# Patient Record
Sex: FEMALE | Race: WHITE | Employment: OTHER | URBAN - METROPOLITAN AREA
[De-identification: names, ages, dates, MRNs, and addresses within clinical notes are randomized per-mention and may not be internally consistent; named-entity substitution may affect disease eponyms.]

---

## 2017-03-24 ENCOUNTER — HOSPITAL ENCOUNTER (OUTPATIENT)
Dept: WOMENS IMAGING | Age: 77
Discharge: HOME | End: 2017-03-24
Payer: MEDICARE

## 2017-03-24 DIAGNOSIS — M81.0 OSTEOPOROSIS, UNSPECIFIED: ICD-10-CM

## 2017-03-24 PROCEDURE — 77080 DXA BONE DENSITY AXIAL: CPT

## 2017-08-08 LAB
ALBUMIN SERPL-MCNC: 3.4 G/DL
ALP BLD-CCNC: 80 U/L
ALT SERPL-CCNC: 9 U/L
ANION GAP SERPL CALCULATED.3IONS-SCNC: 1.6 MMOL/L
AST SERPL-CCNC: 17 U/L
BASOPHILS ABSOLUTE: 0.1 /ΜL
BASOPHILS RELATIVE PERCENT: 0.9 %
BILIRUB SERPL-MCNC: 0.3 MG/DL (ref 0.1–1.4)
BUN BLDV-MCNC: 10 MG/DL
CALCIUM SERPL-MCNC: 8.8 MG/DL
CHLORIDE BLD-SCNC: 106 MMOL/L
CHOLESTEROL, TOTAL: 173 MG/DL
CHOLESTEROL/HDL RATIO: 2.4
CO2: 28 MMOL/L
CREAT SERPL-MCNC: 0.6 MG/DL
EOSINOPHILS ABSOLUTE: 0.1 /ΜL
EOSINOPHILS RELATIVE PERCENT: 1.3 %
GFR CALCULATED: 97
GLUCOSE BLD-MCNC: 75 MG/DL
HCT VFR BLD CALC: 39.5 % (ref 36–46)
HDLC SERPL-MCNC: 47 MG/DL (ref 35–70)
HEMOGLOBIN: 13.7 G/DL (ref 12–16)
LDL CHOLESTEROL CALCULATED: 113 MG/DL (ref 0–160)
LYMPHOCYTES ABSOLUTE: 2.2 /ΜL
LYMPHOCYTES RELATIVE PERCENT: 53.3 %
MCH RBC QN AUTO: 33.6 PG
MCHC RBC AUTO-ENTMCNC: 34.7 G/DL
MCV RBC AUTO: 97 FL
MONOCYTES ABSOLUTE: 0.5 /ΜL
MONOCYTES RELATIVE PERCENT: 10.9 %
NEUTROPHILS ABSOLUTE: 1.4 /ΜL
NEUTROPHILS RELATIVE PERCENT: 33.6 %
PDW BLD-RTO: 12.8 %
PLATELET # BLD: 118 K/ΜL
PMV BLD AUTO: 11.5 FL
POTASSIUM SERPL-SCNC: 4.7 MMOL/L
RBC # BLD: 4.07 10^6/ΜL
SODIUM BLD-SCNC: 142 MMOL/L
TOTAL PROTEIN: 5.5
TRIGL SERPL-MCNC: 65 MG/DL
VLDLC SERPL CALC-MCNC: 13 MG/DL
WBC # BLD: 4.2 10^3/ML

## 2017-10-09 RX ORDER — DIAPER,BRIEF,INFANT-TODD,DISP
EACH MISCELLANEOUS 2 TIMES DAILY
COMMUNITY
End: 2020-03-23 | Stop reason: ALTCHOICE

## 2017-10-09 RX ORDER — LACTULOSE 10 G/15ML
10 SOLUTION ORAL; RECTAL DAILY
Status: ON HOLD | COMMUNITY
End: 2022-10-19 | Stop reason: HOSPADM

## 2017-10-09 RX ORDER — TETRAHYDROZOLINE HCL 0.05 %
1 DROPS OPHTHALMIC (EYE) 3 TIMES DAILY
COMMUNITY
End: 2019-04-08

## 2017-10-09 RX ORDER — DIVALPROEX SODIUM 250 MG/1
500 TABLET, DELAYED RELEASE ORAL 2 TIMES DAILY
COMMUNITY

## 2017-10-09 RX ORDER — BUSPIRONE HYDROCHLORIDE 15 MG/1
30 TABLET ORAL 2 TIMES DAILY
COMMUNITY
End: 2019-04-08

## 2017-10-09 RX ORDER — IBUPROFEN 200 MG
200 TABLET ORAL
Status: ON HOLD | COMMUNITY
End: 2022-10-19 | Stop reason: HOSPADM

## 2017-10-09 RX ORDER — ALBUTEROL SULFATE 0.63 MG/3ML
1 SOLUTION RESPIRATORY (INHALATION) EVERY 6 HOURS PRN
COMMUNITY
End: 2020-05-06

## 2017-10-09 RX ORDER — QUETIAPINE FUMARATE 200 MG/1
100 TABLET, FILM COATED ORAL 2 TIMES DAILY
COMMUNITY
End: 2019-04-08

## 2017-10-10 ENCOUNTER — OFFICE VISIT (OUTPATIENT)
Dept: INTERNAL MEDICINE | Age: 77
End: 2017-10-10

## 2017-10-10 VITALS
SYSTOLIC BLOOD PRESSURE: 110 MMHG | HEIGHT: 60 IN | WEIGHT: 149 LBS | DIASTOLIC BLOOD PRESSURE: 68 MMHG | HEART RATE: 70 BPM | BODY MASS INDEX: 29.25 KG/M2

## 2017-10-10 DIAGNOSIS — F79 MENTAL RETARDATION: ICD-10-CM

## 2017-10-10 DIAGNOSIS — Z23 NEED FOR PNEUMOCOCCAL VACCINATION: ICD-10-CM

## 2017-10-10 DIAGNOSIS — R56.9 SEIZURE (HCC): Primary | ICD-10-CM

## 2017-10-10 DIAGNOSIS — F79 MR (MENTAL RETARDATION): ICD-10-CM

## 2017-10-10 DIAGNOSIS — H54.8 LEGALLY BLIND: ICD-10-CM

## 2017-10-10 DIAGNOSIS — F39 MOOD DISORDER (HCC): ICD-10-CM

## 2017-10-10 PROCEDURE — G8419 CALC BMI OUT NRM PARAM NOF/U: HCPCS | Performed by: FAMILY MEDICINE

## 2017-10-10 PROCEDURE — 4040F PNEUMOC VAC/ADMIN/RCVD: CPT | Performed by: FAMILY MEDICINE

## 2017-10-10 PROCEDURE — G0009 ADMIN PNEUMOCOCCAL VACCINE: HCPCS | Performed by: FAMILY MEDICINE

## 2017-10-10 PROCEDURE — G8427 DOCREV CUR MEDS BY ELIG CLIN: HCPCS | Performed by: FAMILY MEDICINE

## 2017-10-10 PROCEDURE — 1123F ACP DISCUSS/DSCN MKR DOCD: CPT | Performed by: FAMILY MEDICINE

## 2017-10-10 PROCEDURE — 99213 OFFICE O/P EST LOW 20 MIN: CPT | Performed by: FAMILY MEDICINE

## 2017-10-10 PROCEDURE — G8484 FLU IMMUNIZE NO ADMIN: HCPCS | Performed by: FAMILY MEDICINE

## 2017-10-10 PROCEDURE — 90670 PCV13 VACCINE IM: CPT | Performed by: FAMILY MEDICINE

## 2017-10-10 PROCEDURE — G8399 PT W/DXA RESULTS DOCUMENT: HCPCS | Performed by: FAMILY MEDICINE

## 2017-10-10 PROCEDURE — 1036F TOBACCO NON-USER: CPT | Performed by: FAMILY MEDICINE

## 2017-10-10 PROCEDURE — 1090F PRES/ABSN URINE INCON ASSESS: CPT | Performed by: FAMILY MEDICINE

## 2017-10-10 ASSESSMENT — PATIENT HEALTH QUESTIONNAIRE - PHQ9
SUM OF ALL RESPONSES TO PHQ9 QUESTIONS 1 & 2: 0
SUM OF ALL RESPONSES TO PHQ QUESTIONS 1-9: 0
1. LITTLE INTEREST OR PLEASURE IN DOING THINGS: 0
2. FEELING DOWN, DEPRESSED OR HOPELESS: 0

## 2017-10-10 NOTE — PROGRESS NOTES
Patient: Juan Thornton    YOB: 1940    Date: 10/10/17    Patient Active Problem List    Diagnosis Date Noted    Mental retardation 09/03/2013    Dyslipidemia     Periodontal disease     Full code status 07/31/2013     Overview Note:     5/1/05  FULL CODE        Dietary restriction 07/31/2013     Overview Note:     4/21/10  SMALL PORTIONS BLEND. NO RAW FRUIT OR VEGETABLES EXCEPT BANANAS. NO GAS PRODUCING FOODS.  2/22/12  2 SCOOPS OF PROTEIN POWDER PO QD, 1 SCOOP AT BREAKFAST AND 1 SCOOP AT Dozwil.  Vitamin D deficiency     Osteoporosis     MR (mental retardation)     Seizure disorder (HCC)     Legally blind     Chronic rhinitis     Mood disorder ([facility])      Past Medical History:   Diagnosis Date    Ceruminosis     Chronic rhinitis     Dyslipidemia     History of encephalitis     Legally blind     [facility]     MR (mental retardation)     Osteoporosis     Periodontal disease     Seizure disorder ([facility]     Vitamin D deficiency      No past surgical history on file. No family history on file. Social History     Social History    Marital status: Single     Spouse name: N/A    Number of children: N/A    Years of education: N/A     Occupational History    Not on file.      Social History Main Topics    Smoking status: Never Smoker    Smokeless tobacco: Never Used    Alcohol use No    Drug use: No    Sexual activity: Not on file     Other Topics Concern    Not on file     Social History Narrative    ** Merged History Encounter **          Current Outpatient Prescriptions on File Prior to Visit   Medication Sig Dispense Refill    busPIRone (BUSPAR) 15 MG tablet Take 15 mg by mouth 2 times daily      divalproex (DEPAKOTE) 250 MG DR tablet Take 250 mg by mouth 3 times daily      lactulose encephalopathy 10 GM/15ML SOLN solution Take 20 g by mouth 3 times daily      QUEtiapine (SEROQUEL) 200 MG tablet Take 200 mg by mouth 2 times daily      psyllium (REGULOID) 58.6 % powder Take 1 packet by mouth 3 times daily Take by mouth 3 times daily.  albuterol (ACCUNEB) 0.63 MG/3ML nebulizer solution Take 1 ampule by nebulization every 6 hours as needed for Wheezing      sodium chloride (OCEAN, BABY AYR) 0.65 % nasal spray 1 spray by Nasal route as needed for Congestion      carbamide peroxide (DEBROX) 6.5 % otic solution 5 drops 2 times daily      ibuprofen (ADVIL;MOTRIN) 200 MG tablet Take 200 mg by mouth every 6 hours as needed for Pain      hydrocortisone 1 % cream Apply topically 2 times daily Apply topically 2 times daily.  Probiotic Product (PROBIOTIC PO) Take by mouth      Polyvinyl Alcohol-Povidone (REFRESH OP) Apply to eye      pseudoephedrine-codeine-guaifenesin (MYTUSSIN DAC) 30- MG/5ML solution Take 10 mLs by mouth 4 times daily as needed for Cough      tetrahydrozoline 0.05 % ophthalmic solution 1 drop 3 times daily      Sodium Phosphates (FLEET) Place 1 enema rectally daily as needed. Indications: Constipation      mupirocin (BACTROBAN) 2 % ointment Apply INTRANASALLY BID. RUB NARES TOGETHER. Indications: History MRSA.  OXYGEN Inhale 2 L/min into the lungs as needed. ADMINISTER VIA N/C.  TITRATE PRN TO MAINTAIN PULSE OX ABOVE 92%. Indications: Hypoxia      Cetirizine HCl 10 MG CAPS Take 10 mg by mouth daily. 90 capsule 3    alendronate (FOSAMAX) 70 MG tablet Take 70 mg by mouth every 7 days. Indications: Osteoporosis      niacin (NIASPAN) 500 MG CR tablet Take 500 mg by mouth nightly. Indications: Dyslipidemia      simvastatin (ZOCOR) 40 MG tablet Take 40 mg by mouth nightly. Indications: Dyslipidemia      acetaminophen (TYLENOL) 325 MG tablet Take 650 mg by mouth every 4 hours as needed. May give rectal if unable to tolerate PO administration. Indications: Fever, Pain      Magnesium Hydroxide (MILK OF MAGNESIA PO) Take 30 mLs by mouth daily as needed.  Indications: Constipation      hydrocortisone (ANUSOL-HC) 2.5 % rectal cream Place 2.5 g rectally 3 times daily as needed. Indications: Hemorrhoids      risedronate (ACTONEL) 35 MG tablet Take 35 mg by mouth every 7 days.  niacin-lovastatin (ADVICOR) 500-20 MG per tablet Take 1 tablet by mouth nightly. Indications: Dyslipidemia      aspirin 325 MG EC tablet Take 325 mg by mouth daily. Indications: Prevention of CVD      clonazepam (KLONOPIN) 1 MG tablet Take 1 mg by mouth every evening. Indications: Agitation, Feeling Anxious, Trouble Sleeping      Divalproex Sodium (DEPAKOTE ER PO) Take 500 mg by mouth 3 times daily. Indications: Adverse Behavior, seizure disorder      docusate sodium (COLACE) 100 MG capsule Take 100 mg by mouth 2 times daily. Hold for loose stools. Indications: Chronic Constipation      fexofenadine (ALLEGRA) 180 MG tablet Take 180 mg by mouth daily. Indications: Allergic Rhinitis      fluticasone (FLONASE) 50 MCG/ACT nasal spray 1 spray by Nasal route 2 times daily. Indications: Allergic Rhinitis      Multiple Vitamin (MULTIVITAMIN PO) Take 1 tablet by mouth daily. Indications: multivitamin      calcium-vitamin D (OYSTER CALCIUM 500 + D) 500-200 MG-UNIT per tablet Take 1 tablet by mouth 2 times daily. Administer with food. Indications: supplement      quetiapine (SEROQUEL XR) 400 MG XR tablet Take 400 mg by mouth nightly. Indications: Adverse Behavior, Depression      vitamin D (ERGOCALCIFEROL) 95479 UNITS CAPS capsule Take 50,000 Units by mouth every 14 days. Indications: Vitamin D Deficiency       No current facility-administered medications on file prior to visit.       No Known Allergies    Chief Complaint   Patient presents with   F[facility]al Li 79. pt, previous PCP was Dr. Harris Acuna  New patient to office here to establish care  Currently resides at [facility] Sherry Barnard is here with her caregiver  Patient is legally blind and nonverbal  Per caregiver there are no new concerns      Review of Systems   Unable to perform ROS: Patient nonverbal       Physical Exam  Vitals:    10/10/17 1006   BP: 110/68   Pulse: 70   Body mass index is 29.1 kg/m². Physical Exam   Constitutional: She appears well-developed and well-nourished. Neck: Normal range of motion. Cardiovascular: Normal rate, regular rhythm and normal heart sounds. Pulmonary/Chest: Effort normal and breath sounds normal. No respiratory distress. She has no wheezes. Kyphosis and scoliosis   Musculoskeletal: She exhibits no edema. Skin: Skin is warm. Assessment:  Berny Patrick was seen today for establish care. Diagnoses and all orders for this visit:    Seizure ([address]  No recent seziures  Mood disorder ([address]  stable  MR (mental retardation)  Legally blind  Mental retardation    Need for pneumococcal vaccination  -     Pneumococcal conjugate vaccine 13-valent IM (PREVNAR 13)    Quality & Risk Score Accuracy - MEDICARE ADVANTAGE    Visit Dx:  Seizure ([address]  Stable based upon review of recent symptoms. Continue current treatment plan and follow up at least yearly.   Last edited 10/10/17 14:45 EDT by Kirsten Beck MD         Orders Placed This Encounter   Procedures    Pneumococcal conjugate vaccine 13-valent IM (PREVNAR 13)     f/u for yearly exam    Dr. Kirsten Beck      10/10/17  2:42 PM

## 2017-11-23 LAB
ALBUMIN SERPL-MCNC: 3.3 G/DL
ALP BLD-CCNC: 58 U/L
ALT SERPL-CCNC: 8 U/L
ANION GAP SERPL CALCULATED.3IONS-SCNC: 1.5 MMOL/L
AST SERPL-CCNC: 17 U/L
BASOPHILS ABSOLUTE: 0.1 /ΜL
BASOPHILS RELATIVE PERCENT: 0.8 %
BILIRUB SERPL-MCNC: 0.4 MG/DL (ref 0.1–1.4)
BUN BLDV-MCNC: 12 MG/DL
CALCIUM SERPL-MCNC: 8.4 MG/DL
CHLORIDE BLD-SCNC: 101 MMOL/L
CO2: 27 MMOL/L
CREAT SERPL-MCNC: 0.6 MG/DL
EOSINOPHILS ABSOLUTE: 0.1 /ΜL
EOSINOPHILS RELATIVE PERCENT: 1.3 %
GFR CALCULATED: 97
GLUCOSE BLD-MCNC: 78 MG/DL
HCT VFR BLD CALC: 39.1 % (ref 36–46)
HEMOGLOBIN: 13.8 G/DL (ref 12–16)
LYMPHOCYTES ABSOLUTE: 3 /ΜL
LYMPHOCYTES RELATIVE PERCENT: 55.1 %
MCH RBC QN AUTO: 34 PG
MCHC RBC AUTO-ENTMCNC: 35.3 G/DL
MCV RBC AUTO: 96.4 FL
MONOCYTES ABSOLUTE: 0.7 /ΜL
MONOCYTES RELATIVE PERCENT: 12.2 %
NEUTROPHILS ABSOLUTE: 1.7 /ΜL
NEUTROPHILS RELATIVE PERCENT: 30.6 %
PDW BLD-RTO: 12.9 %
PLATELET # BLD: 105 K/ΜL
PMV BLD AUTO: 11 FL
POTASSIUM SERPL-SCNC: 4.9 MMOL/L
RBC # BLD: 4.06 10^6/ΜL
SODIUM BLD-SCNC: 135 MMOL/L
TOTAL PROTEIN: 5.5
WBC # BLD: 5.5 10^3/ML

## 2017-12-07 PROBLEM — T50.905A DRUG-INDUCED THROMBOCYTOPENIA: Status: ACTIVE | Noted: 2017-12-07

## 2017-12-07 PROBLEM — D69.59 DRUG-INDUCED THROMBOCYTOPENIA: Status: ACTIVE | Noted: 2017-12-07

## 2017-12-28 DIAGNOSIS — G40.909 SEIZURE DISORDER (HCC): Primary | ICD-10-CM

## 2017-12-29 RX ORDER — CLONAZEPAM 1 MG/1
1 TABLET ORAL EVERY EVENING
Qty: 60 TABLET | OUTPATIENT
Start: 2017-12-29

## 2017-12-29 RX ORDER — CLONAZEPAM 1 MG/1
1 TABLET ORAL EVERY EVENING
Qty: 30 TABLET | Refills: 0 | Status: SHIPPED | OUTPATIENT
Start: 2017-12-29 | End: 2018-01-02 | Stop reason: SDUPTHER

## 2018-01-02 DIAGNOSIS — G40.909 SEIZURE DISORDER (HCC): ICD-10-CM

## 2018-01-02 RX ORDER — CLONAZEPAM 1 MG/1
1 TABLET ORAL 3 TIMES DAILY
Qty: 90 TABLET | Refills: 0 | Status: SHIPPED | OUTPATIENT
Start: 2018-01-02 | End: 2018-01-29 | Stop reason: SDUPTHER

## 2018-01-11 LAB
VITAMIN D 25-HYDROXY: 27
VITAMIN D2, 25 HYDROXY: NORMAL
VITAMIN D3,25 HYDROXY: NORMAL

## 2018-01-12 LAB
BASOPHILS ABSOLUTE: 0.1 /ΜL
BASOPHILS RELATIVE PERCENT: 1.1 %
EOSINOPHILS ABSOLUTE: 0.1 /ΜL
EOSINOPHILS RELATIVE PERCENT: 1.7 %
HCT VFR BLD CALC: 39.1 % (ref 36–46)
HEMOGLOBIN: 13.5 G/DL (ref 12–16)
LYMPHOCYTES ABSOLUTE: 2.7 /ΜL
LYMPHOCYTES RELATIVE PERCENT: 52.3 %
MCH RBC QN AUTO: 33.9 PG
MCHC RBC AUTO-ENTMCNC: 34.6 G/DL
MCV RBC AUTO: 98 FL
MONOCYTES ABSOLUTE: 0.5 /ΜL
MONOCYTES RELATIVE PERCENT: 10.2 %
NEUTROPHILS ABSOLUTE: 1.8 /ΜL
NEUTROPHILS RELATIVE PERCENT: 34.7 %
PDW BLD-RTO: 13 %
PLATELET # BLD: 129 K/ΜL
PMV BLD AUTO: 11.4 FL
RBC # BLD: 3.99 10^6/ΜL
WBC # BLD: 5.1 10^3/ML

## 2018-01-24 RX ORDER — WHITE PETROLATUM 450 MG/G
STICK TOPICAL PRN
Status: ON HOLD | COMMUNITY
End: 2022-10-19 | Stop reason: HOSPADM

## 2018-01-24 RX ORDER — CHLORHEXIDINE GLUCONATE 0.12 MG/ML
15 RINSE ORAL 2 TIMES DAILY
COMMUNITY
End: 2019-10-09

## 2018-01-29 DIAGNOSIS — G40.909 SEIZURE DISORDER (HCC): ICD-10-CM

## 2018-01-29 RX ORDER — CLONAZEPAM 1 MG/1
1 TABLET ORAL 2 TIMES DAILY
Qty: 60 TABLET | Refills: 5 | Status: SHIPPED | OUTPATIENT
Start: 2018-01-29 | End: 2018-01-30 | Stop reason: SDUPTHER

## 2018-01-30 DIAGNOSIS — G40.909 SEIZURE DISORDER (HCC): ICD-10-CM

## 2018-01-31 RX ORDER — CLONAZEPAM 1 MG/1
1 TABLET ORAL 3 TIMES DAILY
Qty: 90 TABLET | Refills: 5 | Status: ON HOLD | OUTPATIENT
Start: 2018-01-31 | End: 2022-10-19

## 2018-02-08 LAB
ABSOLUTE BANDS #: NORMAL
ABSOLUTE BANDS #: NORMAL
ABSOLUTE EOS #: 0.1
ABSOLUTE EOS #: NORMAL
ABSOLUTE IMMATURE GRANULOCYTE: NORMAL
ABSOLUTE IMMATURE GRANULOCYTE: NORMAL
ABSOLUTE LYMPH #: 2.2
ABSOLUTE LYMPH #: NORMAL
ABSOLUTE MONO #: 0.5
ABSOLUTE MONO #: NORMAL
ALBUMIN SERPL-MCNC: 3.4 G/DL
ALBUMIN SERPL-MCNC: 3.4 G/DL
ALP BLD-CCNC: 65 U/L
ALP BLD-CCNC: 65 U/L
ALT SERPL-CCNC: 9 U/L
ALT SERPL-CCNC: 9 U/L
ANION GAP SERPL CALCULATED.3IONS-SCNC: NORMAL MMOL/L
ANION GAP SERPL CALCULATED.3IONS-SCNC: NORMAL MMOL/L
AST SERPL-CCNC: 19 U/L
AST SERPL-CCNC: 19 U/L
BANDS: NORMAL
BANDS: NORMAL
BASOPHILS # BLD: 0.6 10*3/UL
BASOPHILS # BLD: NORMAL 10*3/UL
BASOPHILS ABSOLUTE: NORMAL /ΜL
BASOPHILS RELATIVE PERCENT: 0.6 %
BILIRUB SERPL-MCNC: 0.4 MG/DL (ref 0.1–1.4)
BILIRUB SERPL-MCNC: 0.4 MG/DL (ref 0.1–1.4)
BUN BLDV-MCNC: 13 MG/DL
BUN BLDV-MCNC: 13 MG/DL
CALCIUM SERPL-MCNC: 8.6 MG/DL
CALCIUM SERPL-MCNC: 8.6 MG/DL
CHLORIDE BLD-SCNC: 101 MMOL/L
CHLORIDE BLD-SCNC: 101 MMOL/L
CHOLESTEROL, TOTAL: 210 MG/DL
CHOLESTEROL/HDL RATIO: 3.1
CO2: 27 MMOL/L
CO2: 27 MMOL/L
CREAT SERPL-MCNC: 0.6 MG/DL
CREAT SERPL-MCNC: 0.6 MG/DL
EOSINOPHILS ABSOLUTE: 0.1 /ΜL
EOSINOPHILS RELATIVE PERCENT: 1.2
EOSINOPHILS RELATIVE PERCENT: 1.2 %
EOSINOPHILS RELATIVE PERCENT: NORMAL
GFR CALCULATED: 117
GFR CALCULATED: 97
GLUCOSE BLD-MCNC: 79 MG/DL
GLUCOSE BLD-MCNC: 79 MG/DL
HCT VFR BLD CALC: 39.1 % (ref 36–46)
HCT VFR BLD CALC: 39.1 % (ref 36–46)
HCT VFR BLD CALC: NORMAL % (ref 36–46)
HDLC SERPL-MCNC: 47 MG/DL (ref 35–70)
HEMOGLOBIN: 13.6 G/DL (ref 12–16)
HEMOGLOBIN: 13.6 G/DL (ref 12–16)
HEMOGLOBIN: NORMAL G/DL (ref 12–16)
IMMATURE GRANULOCYTES: NORMAL
IMMATURE GRANULOCYTES: NORMAL
LDL CHOLESTEROL CALCULATED: 145 MG/DL (ref 0–160)
LYMPHOCYTES # BLD: 53.5 10*3/UL
LYMPHOCYTES # BLD: NORMAL 10*3/UL
LYMPHOCYTES ABSOLUTE: 2.2 /ΜL
LYMPHOCYTES RELATIVE PERCENT: 53.5 %
MCH RBC QN AUTO: 33.3 PG
MCH RBC QN AUTO: 33.3 PG
MCH RBC QN AUTO: NORMAL PG
MCHC RBC AUTO-ENTMCNC: 34.8 G/DL
MCHC RBC AUTO-ENTMCNC: 34.8 G/DL
MCHC RBC AUTO-ENTMCNC: NORMAL G/DL
MCV RBC AUTO: 95.8 FL
MCV RBC AUTO: 95.8 FL
MCV RBC AUTO: NORMAL FL
MONOCYTES # BLD: 11.5 10*3/UL
MONOCYTES # BLD: NORMAL 10*3/UL
MONOCYTES ABSOLUTE: 0.5 /ΜL
MONOCYTES RELATIVE PERCENT: 11.5 %
MORPHOLOGY: NORMAL
MORPHOLOGY: NORMAL
NEUTROPHILS ABSOLUTE: 1.4 /ΜL
NEUTROPHILS RELATIVE PERCENT: 33.2 %
NRBC AUTOMATED: NORMAL
NRBC AUTOMATED: NORMAL
PDW BLD-RTO: 13.3 %
PDW BLD-RTO: 13.3 %
PDW BLD-RTO: NORMAL %
PLATELET # BLD: 106 K/ΜL
PLATELET # BLD: 106 K/ΜL
PLATELET # BLD: NORMAL K/ΜL
PLATELET ESTIMATE: NORMAL
PLATELET ESTIMATE: NORMAL
PMV BLD AUTO: 11.8 FL
PMV BLD AUTO: 11.8 FL
PMV BLD AUTO: NORMAL FL
POTASSIUM SERPL-SCNC: 4.5 MMOL/L
POTASSIUM SERPL-SCNC: 4.5 MMOL/L
RBC # BLD: 4.08 10^6/ΜL
RBC # BLD: 4.08 10^6/ΜL
RBC # BLD: NORMAL 10*6/UL
RBC # BLD: NORMAL 10*6/UL
RBC # BLD: NORMAL 10^6/ΜL
SEG NEUTROPHILS: 33.2
SEG NEUTROPHILS: NORMAL
SEGMENTED NEUTROPHILS ABSOLUTE COUNT: 1.4
SEGMENTED NEUTROPHILS ABSOLUTE COUNT: NORMAL
SODIUM BLD-SCNC: 137 MMOL/L
SODIUM BLD-SCNC: 137 MMOL/L
TOTAL PROTEIN: 5.6
TOTAL PROTEIN: 5.6
TRIGL SERPL-MCNC: 92 MG/DL
VLDLC SERPL CALC-MCNC: 18 MG/DL
WBC # BLD: 4.2 10^3/ML
WBC # BLD: 4.2 10^3/ML
WBC # BLD: NORMAL 10*3/UL
WBC # BLD: NORMAL 10*3/UL
WBC # BLD: NORMAL 10^3/ML

## 2018-03-07 PROBLEM — D72.819 LEUCOPENIA: Status: ACTIVE | Noted: 2018-03-07

## 2018-04-03 ENCOUNTER — OFFICE VISIT (OUTPATIENT)
Dept: INTERNAL MEDICINE | Age: 78
End: 2018-04-03
Payer: MEDICARE

## 2018-04-03 VITALS
SYSTOLIC BLOOD PRESSURE: 118 MMHG | DIASTOLIC BLOOD PRESSURE: 64 MMHG | HEIGHT: 60 IN | BODY MASS INDEX: 29.29 KG/M2 | HEART RATE: 64 BPM | WEIGHT: 149.2 LBS

## 2018-04-03 DIAGNOSIS — G40.909 SEIZURE DISORDER (HCC): ICD-10-CM

## 2018-04-03 DIAGNOSIS — R56.9 SEIZURE (HCC): ICD-10-CM

## 2018-04-03 DIAGNOSIS — Z00.00 ROUTINE GENERAL MEDICAL EXAMINATION AT A HEALTH CARE FACILITY: Primary | ICD-10-CM

## 2018-04-03 DIAGNOSIS — F39 MOOD DISORDER (HCC): ICD-10-CM

## 2018-04-03 PROCEDURE — 4040F PNEUMOC VAC/ADMIN/RCVD: CPT | Performed by: FAMILY MEDICINE

## 2018-04-03 PROCEDURE — G0439 PPPS, SUBSEQ VISIT: HCPCS | Performed by: FAMILY MEDICINE

## 2018-04-03 ASSESSMENT — ANXIETY QUESTIONNAIRES: GAD7 TOTAL SCORE: 0

## 2018-04-03 ASSESSMENT — PATIENT HEALTH QUESTIONNAIRE - PHQ9: SUM OF ALL RESPONSES TO PHQ QUESTIONS 1-9: 0

## 2018-04-03 ASSESSMENT — LIFESTYLE VARIABLES: HOW OFTEN DO YOU HAVE A DRINK CONTAINING ALCOHOL: 0

## 2018-04-10 LAB — TSH SERPL DL<=0.05 MIU/L-ACNC: 3.26 UIU/ML

## 2018-05-22 LAB
ABSOLUTE BANDS #: ABNORMAL
ABSOLUTE BANDS #: ABNORMAL
ABSOLUTE BANDS #: NORMAL
ABSOLUTE EOS #: 0.1
ABSOLUTE EOS #: 0.1
ABSOLUTE EOS #: ABNORMAL
ABSOLUTE IMMATURE GRANULOCYTE: ABNORMAL
ABSOLUTE IMMATURE GRANULOCYTE: ABNORMAL
ABSOLUTE IMMATURE GRANULOCYTE: NORMAL
ABSOLUTE LYMPH #: 2.9
ABSOLUTE LYMPH #: 2.9
ABSOLUTE LYMPH #: ABNORMAL
ABSOLUTE MONO #: 0.6
ABSOLUTE MONO #: 0.6
ABSOLUTE MONO #: ABNORMAL
BANDS: ABNORMAL
BANDS: ABNORMAL
BANDS: NORMAL
BASOPHILS # BLD: 0.8 10*3/UL
BASOPHILS # BLD: 0.8 10*3/UL
BASOPHILS # BLD: ABNORMAL 10*3/UL
BASOPHILS ABSOLUTE: ABNORMAL /ΜL
BASOPHILS ABSOLUTE: NORMAL /ΜL
BASOPHILS RELATIVE PERCENT: ABNORMAL %
EOSINOPHILS ABSOLUTE: ABNORMAL /ΜL
EOSINOPHILS RELATIVE PERCENT: 1.8
EOSINOPHILS RELATIVE PERCENT: 1.8
EOSINOPHILS RELATIVE PERCENT: ABNORMAL
EOSINOPHILS RELATIVE PERCENT: ABNORMAL %
HCT VFR BLD CALC: 35.3 % (ref 36–46)
HCT VFR BLD CALC: 35.3 % (ref 36–46)
HCT VFR BLD CALC: 35.5 % (ref 36–46)
HCT VFR BLD CALC: NORMAL % (ref 36–46)
HEMOGLOBIN: 12.7 G/DL (ref 12–16)
HEMOGLOBIN: NORMAL G/DL (ref 12–16)
IMMATURE GRANULOCYTES: ABNORMAL
IMMATURE GRANULOCYTES: ABNORMAL
IMMATURE GRANULOCYTES: NORMAL
LYMPHOCYTES # BLD: 54.6 10*3/UL
LYMPHOCYTES # BLD: 54.6 10*3/UL
LYMPHOCYTES # BLD: ABNORMAL 10*3/UL
LYMPHOCYTES ABSOLUTE: ABNORMAL /ΜL
LYMPHOCYTES RELATIVE PERCENT: ABNORMAL %
MCH RBC QN AUTO: 34.2 PG
MCH RBC QN AUTO: NORMAL PG
MCHC RBC AUTO-ENTMCNC: 35.8 G/DL
MCHC RBC AUTO-ENTMCNC: NORMAL G/DL
MCV RBC AUTO: 95.5 FL
MCV RBC AUTO: NORMAL FL
MONOCYTES # BLD: 10.4 10*3/UL
MONOCYTES # BLD: 10.4 10*3/UL
MONOCYTES # BLD: ABNORMAL 10*3/UL
MONOCYTES ABSOLUTE: ABNORMAL /ΜL
MONOCYTES RELATIVE PERCENT: ABNORMAL %
MORPHOLOGY: ABNORMAL
MORPHOLOGY: ABNORMAL
MORPHOLOGY: NORMAL
NEUTROPHILS ABSOLUTE: ABNORMAL /ΜL
NEUTROPHILS RELATIVE PERCENT: 32.4 %
NRBC AUTOMATED: ABNORMAL
NRBC AUTOMATED: ABNORMAL
NRBC AUTOMATED: NORMAL
PDW BLD-RTO: 12.7 %
PDW BLD-RTO: 12.7 %
PDW BLD-RTO: NORMAL %
PLATELET # BLD: 142 K/ΜL
PLATELET # BLD: 142 K/ΜL
PLATELET # BLD: ABNORMAL K/ΜL
PLATELET # BLD: NORMAL K/ΜL
PLATELET ESTIMATE: ABNORMAL
PLATELET ESTIMATE: ABNORMAL
PLATELET ESTIMATE: NORMAL
PMV BLD AUTO: 142 FL
PMV BLD AUTO: 9.9 FL
PMV BLD AUTO: 9.9 FL
PMV BLD AUTO: NORMAL FL
RBC # BLD: 3.7 10^6/ΜL
RBC # BLD: 3.7 10^6/ΜL
RBC # BLD: ABNORMAL 10*6/UL
RBC # BLD: ABNORMAL 10*6/UL
RBC # BLD: ABNORMAL 10^6/ΜL
RBC # BLD: NORMAL 10*6/UL
RBC # BLD: NORMAL 10^6/ΜL
SEG NEUTROPHILS: 32.4
SEG NEUTROPHILS: 32.4
SEG NEUTROPHILS: NORMAL
SEGMENTED NEUTROPHILS ABSOLUTE COUNT: 1.7
SEGMENTED NEUTROPHILS ABSOLUTE COUNT: 1.7
SEGMENTED NEUTROPHILS ABSOLUTE COUNT: ABNORMAL
WBC # BLD: 5.3 10^3/ML
WBC # BLD: ABNORMAL 10*3/UL
WBC # BLD: ABNORMAL 10*3/UL
WBC # BLD: NORMAL 10*3/UL
WBC # BLD: NORMAL 10^3/ML

## 2018-07-05 LAB
VITAMIN D 25-HYDROXY: 23
VITAMIN D2, 25 HYDROXY: NORMAL
VITAMIN D3,25 HYDROXY: NORMAL

## 2018-08-09 LAB
ABSOLUTE BANDS #: NORMAL
ABSOLUTE BANDS #: NORMAL
ABSOLUTE EOS #: 0.1
ABSOLUTE EOS #: NORMAL
ABSOLUTE IMMATURE GRANULOCYTE: NORMAL
ABSOLUTE IMMATURE GRANULOCYTE: NORMAL
ABSOLUTE LYMPH #: 2.3
ABSOLUTE LYMPH #: NORMAL
ABSOLUTE MONO #: 0.5
ABSOLUTE MONO #: NORMAL
ALBUMIN SERPL-MCNC: 3.5 G/DL
ALP BLD-CCNC: 1.7 U/L
ALP BLD-CCNC: 65 U/L
ALP BLD-CCNC: 65 U/L
ALT SERPL-CCNC: 7 U/L
ANION GAP SERPL CALCULATED.3IONS-SCNC: 1.7 MMOL/L
ANION GAP SERPL CALCULATED.3IONS-SCNC: NORMAL MMOL/L
ANION GAP SERPL CALCULATED.3IONS-SCNC: NORMAL MMOL/L
AST SERPL-CCNC: 18 U/L
BANDS: NORMAL
BANDS: NORMAL
BASOPHILS # BLD: 0.7 10*3/UL
BASOPHILS # BLD: NORMAL 10*3/UL
BASOPHILS ABSOLUTE: NORMAL /ΜL
BASOPHILS RELATIVE PERCENT: NORMAL %
BASOPHILS RELATIVE PERCENT: NORMAL %
BILIRUB SERPL-MCNC: 0.4 MG/DL (ref 0.1–1.4)
BUN BLDV-MCNC: 10 MG/DL
CALCIUM SERPL-MCNC: 8.8 MG/DL
CHLORIDE BLD-SCNC: 99 MMOL/L
CHOLESTEROL, TOTAL: 229 MG/DL
CHOLESTEROL/HDL RATIO: 2.8
CO2: 31 MMOL/L
CO2: 31 MMOL/L
CO2: 5.6 MMOL/L
CREAT SERPL-MCNC: 0.6 MG/DL
EOSINOPHILS ABSOLUTE: NORMAL /ΜL
EOSINOPHILS ABSOLUTE: NORMAL /ΜL
EOSINOPHILS RELATIVE PERCENT: 1.5
EOSINOPHILS RELATIVE PERCENT: NORMAL
EOSINOPHILS RELATIVE PERCENT: NORMAL %
EOSINOPHILS RELATIVE PERCENT: NORMAL %
GFR CALCULATED: 97
GFR CALCULATED: NORMAL
GFR CALCULATED: NORMAL
GLUCOSE BLD-MCNC: 81 MG/DL
HCT VFR BLD CALC: 36.6 % (ref 36–46)
HCT VFR BLD CALC: NORMAL % (ref 36–46)
HDLC SERPL-MCNC: 53 MG/DL (ref 35–70)
HEMOGLOBIN: 12.9 G/DL (ref 12–16)
HEMOGLOBIN: NORMAL G/DL (ref 12–16)
IMMATURE GRANULOCYTES: NORMAL
IMMATURE GRANULOCYTES: NORMAL
LDL CHOLESTEROL CALCULATED: 150 MG/DL (ref 0–160)
LYMPHOCYTES # BLD: 51.3 10*3/UL
LYMPHOCYTES # BLD: NORMAL 10*3/UL
LYMPHOCYTES ABSOLUTE: NORMAL /ΜL
LYMPHOCYTES ABSOLUTE: NORMAL /ΜL
LYMPHOCYTES RELATIVE PERCENT: NORMAL %
LYMPHOCYTES RELATIVE PERCENT: NORMAL %
MCH RBC QN AUTO: 33.7 PG
MCH RBC QN AUTO: 33.7 PG
MCH RBC QN AUTO: NORMAL PG
MCH RBC QN AUTO: NORMAL PG
MCHC RBC AUTO-ENTMCNC: 35.2 G/DL
MCHC RBC AUTO-ENTMCNC: 35.2 G/DL
MCHC RBC AUTO-ENTMCNC: NORMAL G/DL
MCHC RBC AUTO-ENTMCNC: NORMAL G/DL
MCV RBC AUTO: 9.9 FL
MCV RBC AUTO: 95.9 FL
MCV RBC AUTO: NORMAL FL
MCV RBC AUTO: NORMAL FL
MONOCYTES # BLD: 12 10*3/UL
MONOCYTES # BLD: NORMAL 10*3/UL
MONOCYTES ABSOLUTE: NORMAL /ΜL
MONOCYTES ABSOLUTE: NORMAL /ΜL
MONOCYTES RELATIVE PERCENT: NORMAL %
MONOCYTES RELATIVE PERCENT: NORMAL %
MORPHOLOGY: NORMAL
MORPHOLOGY: NORMAL
NEUTROPHILS ABSOLUTE: NORMAL /ΜL
NEUTROPHILS ABSOLUTE: NORMAL /ΜL
NEUTROPHILS RELATIVE PERCENT: 34.5 %
NEUTROPHILS RELATIVE PERCENT: 34.5 %
NRBC AUTOMATED: NORMAL
NRBC AUTOMATED: NORMAL
PDW BLD-RTO: 12.9 %
PDW BLD-RTO: NORMAL %
PLATELET # BLD: 134 K/ΜL
PLATELET # BLD: NORMAL K/ΜL
PLATELET ESTIMATE: NORMAL
PLATELET ESTIMATE: NORMAL
PMV BLD AUTO: 9.9 FL
PMV BLD AUTO: 9.9 FL
PMV BLD AUTO: NORMAL FL
PMV BLD AUTO: NORMAL FL
POTASSIUM SERPL-SCNC: 4.7 MMOL/L
RBC # BLD: 3.82 10^6/ΜL
RBC # BLD: NORMAL 10*6/UL
RBC # BLD: NORMAL 10*6/UL
RBC # BLD: NORMAL 10^6/ΜL
SEG NEUTROPHILS: 34.5
SEG NEUTROPHILS: NORMAL
SEGMENTED NEUTROPHILS ABSOLUTE COUNT: 1.6
SEGMENTED NEUTROPHILS ABSOLUTE COUNT: NORMAL
SODIUM BLD-SCNC: 136 MMOL/L
TOTAL PROTEIN: 5.6
TRIGL SERPL-MCNC: 131 MG/DL
VLDLC SERPL CALC-MCNC: 26 MG/DL
WBC # BLD: 4.5 10^3/ML
WBC # BLD: NORMAL 10*3/UL
WBC # BLD: NORMAL 10*3/UL
WBC # BLD: NORMAL 10^3/ML

## 2018-12-09 ENCOUNTER — APPOINTMENT (OUTPATIENT)
Dept: GENERAL RADIOLOGY | Age: 78
DRG: 194 | End: 2018-12-09
Payer: MEDICARE

## 2018-12-09 ENCOUNTER — HOSPITAL ENCOUNTER (INPATIENT)
Age: 78
LOS: 2 days | Discharge: HOME | DRG: 194 | End: 2018-12-11
Attending: EMERGENCY MEDICINE | Admitting: INTERNAL MEDICINE
Payer: MEDICARE

## 2018-12-09 DIAGNOSIS — R50.9 FEBRILE ILLNESS, ACUTE: ICD-10-CM

## 2018-12-09 DIAGNOSIS — J18.9 PNEUMONIA DUE TO ORGANISM: Primary | ICD-10-CM

## 2018-12-09 LAB
ALBUMIN SERPL-MCNC: 3.8 G/DL (ref 3.9–4.9)
ALP BLD-CCNC: 67 U/L (ref 40–130)
ALT SERPL-CCNC: 11 U/L (ref 0–33)
ANION GAP SERPL CALCULATED.3IONS-SCNC: 12 MEQ/L (ref 7–13)
AST SERPL-CCNC: 19 U/L (ref 0–35)
BACTERIA: ABNORMAL /HPF
BASOPHILS ABSOLUTE: 0 K/UL (ref 0–0.2)
BASOPHILS RELATIVE PERCENT: 0.2 %
BILIRUB SERPL-MCNC: 0.5 MG/DL (ref 0–1.2)
BILIRUBIN URINE: NEGATIVE
BLOOD, URINE: ABNORMAL
BUN BLDV-MCNC: 13 MG/DL (ref 8–23)
CALCIUM SERPL-MCNC: 9.3 MG/DL (ref 8.6–10.2)
CHLORIDE BLD-SCNC: 90 MEQ/L (ref 98–107)
CLARITY: ABNORMAL
CO2: 26 MEQ/L (ref 22–29)
COLOR: YELLOW
CREAT SERPL-MCNC: 0.66 MG/DL (ref 0.5–0.9)
EOSINOPHILS ABSOLUTE: 0 K/UL (ref 0–0.7)
EOSINOPHILS RELATIVE PERCENT: 0.1 %
EPITHELIAL CELLS, UA: ABNORMAL /HPF
GFR AFRICAN AMERICAN: >60
GFR NON-AFRICAN AMERICAN: >60
GLOBULIN: 2.4 G/DL (ref 2.3–3.5)
GLUCOSE BLD-MCNC: 111 MG/DL (ref 74–109)
GLUCOSE URINE: NEGATIVE MG/DL
HCT VFR BLD CALC: 37 % (ref 37–47)
HEMOGLOBIN: 12.9 G/DL (ref 12–16)
KETONES, URINE: ABNORMAL MG/DL
LACTIC ACID: 3.8 MMOL/L (ref 0.5–2.2)
LEUKOCYTE ESTERASE, URINE: ABNORMAL
LYMPHOCYTES ABSOLUTE: 1.5 K/UL (ref 1–4.8)
LYMPHOCYTES RELATIVE PERCENT: 12.4 %
MCH RBC QN AUTO: 33.3 PG (ref 27–31.3)
MCHC RBC AUTO-ENTMCNC: 35 % (ref 33–37)
MCV RBC AUTO: 95.2 FL (ref 82–100)
MONOCYTES ABSOLUTE: 1 K/UL (ref 0.2–0.8)
MONOCYTES RELATIVE PERCENT: 8.6 %
NEUTROPHILS ABSOLUTE: 9.4 K/UL (ref 1.4–6.5)
NEUTROPHILS RELATIVE PERCENT: 78.7 %
NITRITE, URINE: NEGATIVE
PDW BLD-RTO: 13 % (ref 11.5–14.5)
PH UA: 7 (ref 5–9)
PLATELET # BLD: 150 K/UL (ref 130–400)
POTASSIUM SERPL-SCNC: 4.2 MEQ/L (ref 3.5–5.1)
PROTEIN UA: 100 MG/DL
RBC # BLD: 3.88 M/UL (ref 4.2–5.4)
RBC UA: ABNORMAL /HPF (ref 0–2)
RENAL EPITHELIAL, UA: ABNORMAL /HPF
SODIUM BLD-SCNC: 128 MEQ/L (ref 132–144)
SPECIFIC GRAVITY UA: 1.01 (ref 1–1.03)
TOTAL PROTEIN: 6.2 G/DL (ref 6.4–8.1)
URINE REFLEX TO CULTURE: YES
URINE TYPE: ABNORMAL
UROBILINOGEN, URINE: 0.2 E.U./DL
WBC # BLD: 11.9 K/UL (ref 4.8–10.8)
WBC UA: ABNORMAL /HPF (ref 0–5)

## 2018-12-09 PROCEDURE — 6370000000 HC RX 637 (ALT 250 FOR IP): Performed by: INTERNAL MEDICINE

## 2018-12-09 PROCEDURE — 36415 COLL VENOUS BLD VENIPUNCTURE: CPT

## 2018-12-09 PROCEDURE — 85025 COMPLETE CBC W/AUTO DIFF WBC: CPT

## 2018-12-09 PROCEDURE — 94640 AIRWAY INHALATION TREATMENT: CPT

## 2018-12-09 PROCEDURE — 1210000000 HC MED SURG R&B

## 2018-12-09 PROCEDURE — 2580000003 HC RX 258: Performed by: EMERGENCY MEDICINE

## 2018-12-09 PROCEDURE — 6360000002 HC RX W HCPCS: Performed by: EMERGENCY MEDICINE

## 2018-12-09 PROCEDURE — 81001 URINALYSIS AUTO W/SCOPE: CPT

## 2018-12-09 PROCEDURE — 71045 X-RAY EXAM CHEST 1 VIEW: CPT

## 2018-12-09 PROCEDURE — 87040 BLOOD CULTURE FOR BACTERIA: CPT

## 2018-12-09 PROCEDURE — 83605 ASSAY OF LACTIC ACID: CPT

## 2018-12-09 PROCEDURE — 99285 EMERGENCY DEPT VISIT HI MDM: CPT

## 2018-12-09 PROCEDURE — 6370000000 HC RX 637 (ALT 250 FOR IP): Performed by: EMERGENCY MEDICINE

## 2018-12-09 PROCEDURE — 87086 URINE CULTURE/COLONY COUNT: CPT

## 2018-12-09 PROCEDURE — 6360000002 HC RX W HCPCS: Performed by: INTERNAL MEDICINE

## 2018-12-09 PROCEDURE — 80053 COMPREHEN METABOLIC PANEL: CPT

## 2018-12-09 PROCEDURE — 96365 THER/PROPH/DIAG IV INF INIT: CPT

## 2018-12-09 PROCEDURE — 2580000003 HC RX 258: Performed by: INTERNAL MEDICINE

## 2018-12-09 PROCEDURE — 80164 ASSAY DIPROPYLACETIC ACD TOT: CPT

## 2018-12-09 RX ORDER — IPRATROPIUM BROMIDE AND ALBUTEROL SULFATE 2.5; .5 MG/3ML; MG/3ML
1 SOLUTION RESPIRATORY (INHALATION) ONCE
Status: COMPLETED | OUTPATIENT
Start: 2018-12-09 | End: 2018-12-09

## 2018-12-09 RX ORDER — ACETAMINOPHEN 325 MG/1
650 TABLET ORAL EVERY 4 HOURS PRN
Status: DISCONTINUED | OUTPATIENT
Start: 2018-12-09 | End: 2018-12-11 | Stop reason: HOSPADM

## 2018-12-09 RX ORDER — SODIUM CHLORIDE 9 MG/ML
INJECTION, SOLUTION INTRAVENOUS CONTINUOUS
Status: DISCONTINUED | OUTPATIENT
Start: 2018-12-09 | End: 2018-12-11

## 2018-12-09 RX ORDER — IPRATROPIUM BROMIDE AND ALBUTEROL SULFATE 2.5; .5 MG/3ML; MG/3ML
1 SOLUTION RESPIRATORY (INHALATION) EVERY 4 HOURS PRN
Status: DISCONTINUED | OUTPATIENT
Start: 2018-12-09 | End: 2018-12-11 | Stop reason: HOSPADM

## 2018-12-09 RX ORDER — DOCUSATE SODIUM 100 MG/1
100 CAPSULE, LIQUID FILLED ORAL 2 TIMES DAILY
Status: DISCONTINUED | OUTPATIENT
Start: 2018-12-09 | End: 2018-12-11 | Stop reason: HOSPADM

## 2018-12-09 RX ORDER — TETRAHYDROZOLINE HCL 0.05 %
1 DROPS OPHTHALMIC (EYE) 3 TIMES DAILY
Status: DISCONTINUED | OUTPATIENT
Start: 2018-12-09 | End: 2018-12-11 | Stop reason: HOSPADM

## 2018-12-09 RX ORDER — ACETAMINOPHEN 500 MG
1000 TABLET ORAL ONCE
Status: COMPLETED | OUTPATIENT
Start: 2018-12-09 | End: 2018-12-09

## 2018-12-09 RX ORDER — SODIUM CHLORIDE 0.9 % (FLUSH) 0.9 %
10 SYRINGE (ML) INJECTION EVERY 12 HOURS SCHEDULED
Status: DISCONTINUED | OUTPATIENT
Start: 2018-12-09 | End: 2018-12-11 | Stop reason: HOSPADM

## 2018-12-09 RX ORDER — DIVALPROEX SODIUM 500 MG/1
500 TABLET, EXTENDED RELEASE ORAL 2 TIMES DAILY
Status: DISCONTINUED | OUTPATIENT
Start: 2018-12-09 | End: 2018-12-11 | Stop reason: HOSPADM

## 2018-12-09 RX ORDER — ASPIRIN 81 MG/1
81 TABLET, CHEWABLE ORAL DAILY
Status: DISCONTINUED | OUTPATIENT
Start: 2018-12-09 | End: 2018-12-11 | Stop reason: HOSPADM

## 2018-12-09 RX ORDER — SODIUM CHLORIDE 0.9 % (FLUSH) 0.9 %
10 SYRINGE (ML) INJECTION PRN
Status: DISCONTINUED | OUTPATIENT
Start: 2018-12-09 | End: 2018-12-11 | Stop reason: HOSPADM

## 2018-12-09 RX ORDER — ONDANSETRON 2 MG/ML
4 INJECTION INTRAMUSCULAR; INTRAVENOUS EVERY 6 HOURS PRN
Status: DISCONTINUED | OUTPATIENT
Start: 2018-12-09 | End: 2018-12-11 | Stop reason: HOSPADM

## 2018-12-09 RX ORDER — QUETIAPINE FUMARATE 100 MG/1
100 TABLET, FILM COATED ORAL 2 TIMES DAILY
Status: DISCONTINUED | OUTPATIENT
Start: 2018-12-09 | End: 2018-12-11 | Stop reason: HOSPADM

## 2018-12-09 RX ORDER — SODIUM CHLORIDE 0.9 % (FLUSH) 0.9 %
3 SYRINGE (ML) INJECTION EVERY 8 HOURS
Status: DISCONTINUED | OUTPATIENT
Start: 2018-12-09 | End: 2018-12-11 | Stop reason: HOSPADM

## 2018-12-09 RX ORDER — LORAZEPAM 2 MG/ML
0.5 INJECTION INTRAMUSCULAR EVERY 4 HOURS PRN
Status: DISCONTINUED | OUTPATIENT
Start: 2018-12-09 | End: 2018-12-11 | Stop reason: HOSPADM

## 2018-12-09 RX ORDER — 0.9 % SODIUM CHLORIDE 0.9 %
500 INTRAVENOUS SOLUTION INTRAVENOUS ONCE
Status: COMPLETED | OUTPATIENT
Start: 2018-12-09 | End: 2018-12-09

## 2018-12-09 RX ORDER — DIVALPROEX SODIUM 250 MG/1
250 TABLET, DELAYED RELEASE ORAL NIGHTLY
Status: DISCONTINUED | OUTPATIENT
Start: 2018-12-09 | End: 2018-12-11 | Stop reason: HOSPADM

## 2018-12-09 RX ORDER — CLONAZEPAM 0.5 MG/1
1 TABLET ORAL 3 TIMES DAILY
Status: DISCONTINUED | OUTPATIENT
Start: 2018-12-09 | End: 2018-12-11 | Stop reason: HOSPADM

## 2018-12-09 RX ORDER — LACTULOSE 10 G/15ML
20 SOLUTION ORAL 3 TIMES DAILY
Status: DISCONTINUED | OUTPATIENT
Start: 2018-12-09 | End: 2018-12-11 | Stop reason: HOSPADM

## 2018-12-09 RX ORDER — BUSPIRONE HYDROCHLORIDE 5 MG/1
15 TABLET ORAL 2 TIMES DAILY
Status: DISCONTINUED | OUTPATIENT
Start: 2018-12-09 | End: 2018-12-11 | Stop reason: HOSPADM

## 2018-12-09 RX ADMIN — QUETIAPINE FUMARATE 100 MG: 100 TABLET ORAL at 21:10

## 2018-12-09 RX ADMIN — Medication 10 ML: at 21:17

## 2018-12-09 RX ADMIN — LACTULOSE 20 G: 20 SOLUTION ORAL at 21:10

## 2018-12-09 RX ADMIN — ACETAMINOPHEN 1000 MG: 500 TABLET, FILM COATED ORAL at 10:25

## 2018-12-09 RX ADMIN — SODIUM CHLORIDE: 900 INJECTION, SOLUTION INTRAVENOUS at 11:43

## 2018-12-09 RX ADMIN — BUSPIRONE HYDROCHLORIDE 15 MG: 5 TABLET ORAL at 21:10

## 2018-12-09 RX ADMIN — LACTULOSE 20 G: 20 SOLUTION ORAL at 14:54

## 2018-12-09 RX ADMIN — ASPIRIN 81 MG 81 MG: 81 TABLET ORAL at 14:53

## 2018-12-09 RX ADMIN — IPRATROPIUM BROMIDE AND ALBUTEROL SULFATE 1 AMPULE: .5; 3 SOLUTION RESPIRATORY (INHALATION) at 10:27

## 2018-12-09 RX ADMIN — TETRAHYDROZOLINE HYDROCHLORIDE 1 DROP: 0.05 SOLUTION/ DROPS OPHTHALMIC at 15:09

## 2018-12-09 RX ADMIN — DIVALPROEX SODIUM 250 MG: 250 TABLET, DELAYED RELEASE ORAL at 21:11

## 2018-12-09 RX ADMIN — AZITHROMYCIN MONOHYDRATE 500 MG: 500 INJECTION, POWDER, LYOPHILIZED, FOR SOLUTION INTRAVENOUS at 14:53

## 2018-12-09 RX ADMIN — ENOXAPARIN SODIUM 40 MG: 100 INJECTION SUBCUTANEOUS at 14:53

## 2018-12-09 RX ADMIN — DOCUSATE SODIUM 100 MG: 100 CAPSULE, LIQUID FILLED ORAL at 21:10

## 2018-12-09 RX ADMIN — CEFTRIAXONE 1 G: 1 INJECTION, POWDER, FOR SOLUTION INTRAMUSCULAR; INTRAVENOUS at 11:11

## 2018-12-09 RX ADMIN — Medication 3 ML: at 11:11

## 2018-12-09 RX ADMIN — CLONAZEPAM 1 MG: 0.5 TABLET ORAL at 21:10

## 2018-12-09 RX ADMIN — CLONAZEPAM 1 MG: 0.5 TABLET ORAL at 14:54

## 2018-12-09 RX ADMIN — DIVALPROEX SODIUM 500 MG: 500 TABLET, EXTENDED RELEASE ORAL at 21:10

## 2018-12-09 RX ADMIN — SODIUM CHLORIDE 500 ML: 9 INJECTION, SOLUTION INTRAVENOUS at 10:25

## 2018-12-09 ASSESSMENT — ENCOUNTER SYMPTOMS
BLOOD IN STOOL: 0
ABDOMINAL PAIN: 0
CHOKING: 0
EYE REDNESS: 0
SINUS PRESSURE: 0
EYE DISCHARGE: 0
SHORTNESS OF BREATH: 1
DIARRHEA: 0
WHEEZING: 0
SORE THROAT: 0
COUGH: 1
NAUSEA: 1
CONSTIPATION: 0
BACK PAIN: 0
TROUBLE SWALLOWING: 0
FACIAL SWELLING: 0
EYE PAIN: 0
VOICE CHANGE: 0
STRIDOR: 0
CHEST TIGHTNESS: 0
VOMITING: 1

## 2018-12-09 ASSESSMENT — PAIN SCALES - GENERAL: PAINLEVEL_OUTOF10: 0

## 2018-12-09 NOTE — ED TRIAGE NOTES
Patient brought in by Located within [facility] from [address] [place] [address] for complaints of fever and wheeze/SOB per nurse report. Patient responds to command MRDD. Patient alert on NC upon arrival. Audible wheezing heard.

## 2018-12-09 NOTE — H&P
Hospital Medicine History & Physical      PCP: Ruffin Sever, MD    Date of Admission: 12/9/2018    Date of Service: 12/9/18      Chief Complaint:  fever      History Of Present Illness:  66 y.o. female who presented to [facility] from local SNF where she was found to have high grade fever x 1 day duration. Was evaluated in ER and after initial stabilization were admitted for further evaluation/treatment. History were taken from her chart, ER report since she is nonverbal. Chart reviewed. Discussed with Dr Carol Butcher. Past Medical History:          Diagnosis Date    Ceruminosis     Chronic rhinitis     Dyslipidemia     History of encephalitis     Legally blind     Mood disorder ([facility]     MR (mental retardation)     Osteoporosis     Periodontal disease     Seizure disorder ([facility]     Vitamin D deficiency        Past Surgical History:      History reviewed. No pertinent surgical history. Medications Prior to Admission:      Prior to Admission medications    Medication Sig Start Date End Date Taking? Authorizing Provider   Dextromethorphan HBr 10 MG/5ML SYRP Take 5 mLs by mouth every 4 hours as needed    Yes Historical Provider, MD   clonazePAM (KLONOPIN) 1 MG tablet Take 1 tablet by mouth 3 times daily for 150 days.  1/31/18 12/9/18 Yes Ruffin Sever, MD   Sunscreens ([address] [place] Apply topically as needed   Yes Historical Provider, MD   aspirin 81 MG tablet Take 81 mg by mouth daily   Yes Historical Provider, MD   busPIRone (BUSPAR) 15 MG tablet Take 15 mg by mouth 2 times daily   Yes Historical Provider, MD   divalproex (DEPAKOTE) 250 MG DR tablet Take 250 mg by mouth nightly    Yes Historical Provider, MD   lactulose encephalopathy 10 GM/15ML SOLN solution Take 20 g by mouth 3 times daily   Yes Historical Provider, MD   QUEtiapine (SEROQUEL) 200 MG tablet Take 100 mg by mouth 2 times daily    Yes Historical Provider, MD   psyllium (REGULOID) 58.6 % powder Take 1 packet by mouth 3 times daily

## 2018-12-09 NOTE — ED PROVIDER NOTES
weakness. Negative for tremors, seizures, syncope, numbness and headaches. Hematological: Negative for adenopathy. Does not bruise/bleed easily. Psychiatric/Behavioral: Negative for agitation, behavioral problems, hallucinations and sleep disturbance. The patient is not hyperactive. All other systems reviewed and are negative. Except as noted above the remainder of the review of systems was reviewed and negative. PAST MEDICAL HISTORY     Past Medical History:   Diagnosis Date    Ceruminosis     Chronic rhinitis     Dyslipidemia     History of encephalitis     Legally blind     Mood disorder ([facility]     MR (mental retardation)     Osteoporosis     Periodontal disease     Seizure disorder ([facility]     Vitamin D deficiency          SURGICALHISTORY     History reviewed. No pertinent surgical history. CURRENT MEDICATIONS       Current Discharge Medication List      CONTINUE these medications which have NOT CHANGED    Details   Dextromethorphan HBr 10 MG/5ML SYRP Take 5 mLs by mouth every 4 hours as needed       clonazePAM (KLONOPIN) 1 MG tablet Take 1 tablet by mouth 3 times daily for 150 days. Qty: 90 tablet, Refills: 5    Associated Diagnoses: Seizure disorder ([facility]      Sunscreens (CHAPSTICK) STCK Apply topically as needed      aspirin 81 MG tablet Take 81 mg by mouth daily      busPIRone (BUSPAR) 15 MG tablet Take 15 mg by mouth 2 times daily      divalproex (DEPAKOTE) 250 MG DR tablet Take 250 mg by mouth nightly       lactulose encephalopathy 10 GM/15ML SOLN solution Take 20 g by mouth 3 times daily      QUEtiapine (SEROQUEL) 200 MG tablet Take 100 mg by mouth 2 times daily       psyllium (REGULOID) 58.6 % powder Take 1 packet by mouth 3 times daily Take by mouth 3 times daily.       albuterol (ACCUNEB) 0.63 MG/3ML nebulizer solution Take 1 ampule by nebulization every 6 hours as needed for Wheezing      sodium chloride (OCEAN, BABY AYR) 0.65 % nasal spray 1 spray by Nasal route as times daily as needed for Cough      Sodium Phosphates (FLEET) Place 1 enema rectally daily as needed. Indications: Constipation      OXYGEN Inhale 2 L/min into the lungs as needed. ADMINISTER VIA N/C.  TITRATE PRN TO MAINTAIN PULSE OX ABOVE 92%. Indications: Hypoxia      Magnesium Hydroxide (MILK OF MAGNESIA PO) Take 30 mLs by mouth daily as needed. Indications: Constipation      hydrocortisone (ANUSOL-HC) 2.5 % rectal cream Place 2.5 g rectally 3 times daily as needed. Indications: Hemorrhoids      aspirin 325 MG EC tablet Take 325 mg by mouth daily. Indications: Prevention of CVD      fexofenadine (ALLEGRA) 180 MG tablet Take 180 mg by mouth daily. Indications: Allergic Rhinitis             ALLERGIES     Patient has no known allergies. FAMILY HISTORY     History reviewed. No pertinent family history. SOCIAL HISTORY       Social History     Social History    Marital status: Single     Spouse name: N/A    Number of children: N/A    Years of education: N/A     Social History Main Topics    Smoking status: Never Smoker    Smokeless tobacco: Never Used    Alcohol use No    Drug use: No    Sexual activity: Not Asked     Other Topics Concern    None     Social History Narrative    ** Merged History Encounter **            SCREENINGS    [place] Coma Scale  Eye Opening: Spontaneous  Best Verbal Response: Incomprehensible speech  Best Motor Response: Obeys commands  [place] Coma Scale Score: 12 @FLOW([id number])@      PHYSICAL EXAM    (up to 7 for level 4, 8 or more for level 5)     ED Triage Vitals   BP Temp Temp Source Pulse Resp SpO2 Height Weight   12/09/18 1019 12/09/18 1018 12/09/18 1018 12/09/18 1018 12/09/18 1018 12/09/18 1018 -- 12/09/18 1018   (!) 118/56 99.9 °F (37.7 °C) Oral 92 20 96 %  150 lb (68 kg)       Physical Exam   Constitutional: She is oriented to person, place, and time. She appears well-developed.    Alert and follows verbal command and very warm patient not in acute distress color is pink all over   HENT:   Head: Normocephalic. Right Ear: External ear normal.   Left Ear: External ear normal.   Nose: Nose normal.   Eyes: Pupils are equal, round, and reactive to light. EOM are normal.   Neck: Normal range of motion. Neck supple. No JVD present. No tracheal deviation present. No thyromegaly present. Cardiovascular: Normal rate and normal heart sounds. Exam reveals no gallop and no friction rub. No murmur heard. Pulmonary/Chest: No respiratory distress. She has wheezes. She has rales. Abdominal: Soft. Bowel sounds are normal. She exhibits no mass. There is no rebound. Musculoskeletal: Normal range of motion. She exhibits no edema or tenderness. Lymphadenopathy:     She has no cervical adenopathy. Neurological: She is alert and oriented to person, place, and time. No cranial nerve deficit. She exhibits normal muscle tone. Skin: Skin is warm. No rash noted. No erythema. Psychiatric: Her behavior is normal. Thought content normal.   Nursing note and vitals reviewed.       DIAGNOSTIC RESULTS     EKG: All EKG's are interpreted by the Emergency Department Physician who either signs or Co-signsthis chart in the absence of a cardiologist.        RADIOLOGY:   Alphonso Bolder such as CT, Ultrasound and MRI are read by the radiologist. Plain radiographic images are visualized and preliminarily interpreted by the emergency physician with the below findings:        Interpretation per the Radiologist below, if available at the time ofthis note:    XR CHEST PORTABLE   Final Result   Small patch of infiltrate or atelectasis at left base                        ED BEDSIDE ULTRASOUND:   Performed by ED Physician - none    LABS:  Labs Reviewed   COMPREHENSIVE METABOLIC PANEL - Abnormal; Notable for the following:        Result Value    Sodium 128 (*)     Chloride 90 (*)     Glucose 111 (*)     Total Protein 6.2 (*)     Alb 3.8 (*)     All other components within normal limits    Narrative: CALL  [place]  Ron Hung [phone], called result to [id number] [place] [address] at 8187,88/5/25  Chemistry results called to and read back byFrankie RN at 1045 12/9/18,  12/09/2018 10:49, by [facility]   CBC WITH AUTO DIFFERENTIAL - Abnormal; Notable for the following:     WBC 11.9 (*)     RBC 3.88 (*)     MCH 33.3 (*)     Neutrophils # 9.4 (*)     Monocytes # 1.0 (*)     All other components within normal limits   URINE RT REFLEX TO CULTURE - Abnormal; Notable for the following:     Clarity, UA SL CLOUDY (*)     Ketones, Urine TRACE (*)     Blood, Urine SMALL (*)     Protein,  (*)     Leukocyte Esterase, Urine MODERATE (*)     All other components within normal limits   LACTIC ACID, PLASMA - Abnormal; Notable for the following:     Lactic Acid 3.8 (*)     All other components within normal limits    Narrative:     Peggy HE tel. [phone], called result to [address] [place] [address] at 2932,16/4/26  Chemistry results called to and read back byFrankie MC at 1045 12/9/18,  12/09/2018 10:49, by [facility]   MICROSCOPIC URINALYSIS - Abnormal; Notable for the following:     WBC, UA 6-10 (*)     RBC, UA 3-5 (*)     All other components within normal limits   CULTURE BLOOD #1   CULTURE BLOOD #2       All other labs were within normal range or not returned as of this dictation. EMERGENCY DEPARTMENT COURSE and DIFFERENTIAL DIAGNOSIS/MDM:   Vitals:    Vitals:    12/09/18 1138 12/09/18 1141 12/09/18 1245 12/09/18 1415   BP:  (!) 125/57 (!) 110/50    Pulse:  90 86    Resp: 18 18 20    Temp:   98.5 °F (36.9 °C)    TempSrc:   Axillary    SpO2: 97% 97% 95%    Weight:    165 lb 12.8 oz (75.2 kg)           MDM    CRITICAL CARE TIME   Total Critical Care time was  minutes, excluding separately reportableprocedures. There was a high probability of clinicallysignificant/life threatening deterioration in the patient's condition which required my urgent intervention.   ONSULTS:  IP CONSULT TO HOSPITALIST  IP CONSULT TO SOCIAL WORK    PROCEDURES:  Unless otherwise noted below, none     Procedures    FINAL IMPRESSION      1. Pneumonia due to organism    2.  Febrile illness, acute          DISPOSITION/PLAN   DISPOSITION Admitted 12/09/2018 12:49:46 PM      PATIENT REFERRED TO:  Daysi Peters MD  [address] [place] [address]  [phone]            DISCHARGE MEDICATIONS:  Current Discharge Medication List             (Please note that portions of this note were completed with a voice recognition program.  Efforts were made to edit the dictations but occasionally words are mis-transcribed.)    Luzma Romero MD (electronically signed)  Attending Emergency Physician       Luzma Romero MD  12/09/18 5181

## 2018-12-10 ENCOUNTER — APPOINTMENT (OUTPATIENT)
Dept: GENERAL RADIOLOGY | Age: 78
DRG: 194 | End: 2018-12-10
Payer: MEDICARE

## 2018-12-10 LAB
AMMONIA: 34 UMOL/L (ref 11–51)
ANION GAP SERPL CALCULATED.3IONS-SCNC: 11 MEQ/L (ref 7–13)
BUN BLDV-MCNC: 9 MG/DL (ref 8–23)
CALCIUM SERPL-MCNC: 9.1 MG/DL (ref 8.6–10.2)
CHLORIDE BLD-SCNC: 97 MEQ/L (ref 98–107)
CO2: 29 MEQ/L (ref 22–29)
CREAT SERPL-MCNC: 0.46 MG/DL (ref 0.5–0.9)
GFR AFRICAN AMERICAN: >60
GFR NON-AFRICAN AMERICAN: >60
GLUCOSE BLD-MCNC: 102 MG/DL (ref 74–109)
HCT VFR BLD CALC: 34.8 % (ref 37–47)
HEMOGLOBIN: 12.1 G/DL (ref 12–16)
INR BLD: 1.1
LACTIC ACID: 2.2 MMOL/L (ref 0.5–2.2)
MCH RBC QN AUTO: 33.5 PG (ref 27–31.3)
MCHC RBC AUTO-ENTMCNC: 34.7 % (ref 33–37)
MCV RBC AUTO: 96.5 FL (ref 82–100)
PDW BLD-RTO: 13.1 % (ref 11.5–14.5)
PLATELET # BLD: 134 K/UL (ref 130–400)
POTASSIUM REFLEX MAGNESIUM: 4.1 MEQ/L (ref 3.5–5.1)
PROTHROMBIN TIME: 10.5 SEC (ref 9–11.5)
RBC # BLD: 3.6 M/UL (ref 4.2–5.4)
SODIUM BLD-SCNC: 137 MEQ/L (ref 132–144)
VALPROIC ACID LEVEL: 92.6 UG/ML (ref 50–100)
WBC # BLD: 8.8 K/UL (ref 4.8–10.8)

## 2018-12-10 PROCEDURE — 74018 RADEX ABDOMEN 1 VIEW: CPT

## 2018-12-10 PROCEDURE — G8996 SWALLOW CURRENT STATUS: HCPCS

## 2018-12-10 PROCEDURE — G8990 OTHER PT/OT CURRENT STATUS: HCPCS

## 2018-12-10 PROCEDURE — 85027 COMPLETE CBC AUTOMATED: CPT

## 2018-12-10 PROCEDURE — G8991 OTHER PT/OT GOAL STATUS: HCPCS

## 2018-12-10 PROCEDURE — 97530 THERAPEUTIC ACTIVITIES: CPT

## 2018-12-10 PROCEDURE — 85610 PROTHROMBIN TIME: CPT

## 2018-12-10 PROCEDURE — 83605 ASSAY OF LACTIC ACID: CPT

## 2018-12-10 PROCEDURE — 6360000002 HC RX W HCPCS: Performed by: INTERNAL MEDICINE

## 2018-12-10 PROCEDURE — 97161 PT EVAL LOW COMPLEX 20 MIN: CPT

## 2018-12-10 PROCEDURE — 6370000000 HC RX 637 (ALT 250 FOR IP): Performed by: INTERNAL MEDICINE

## 2018-12-10 PROCEDURE — 80048 BASIC METABOLIC PNL TOTAL CA: CPT

## 2018-12-10 PROCEDURE — 80164 ASSAY DIPROPYLACETIC ACD TOT: CPT

## 2018-12-10 PROCEDURE — 2580000003 HC RX 258: Performed by: INTERNAL MEDICINE

## 2018-12-10 PROCEDURE — 2580000003 HC RX 258: Performed by: EMERGENCY MEDICINE

## 2018-12-10 PROCEDURE — 82140 ASSAY OF AMMONIA: CPT

## 2018-12-10 PROCEDURE — 92526 ORAL FUNCTION THERAPY: CPT

## 2018-12-10 PROCEDURE — 1210000000 HC MED SURG R&B

## 2018-12-10 PROCEDURE — 51798 US URINE CAPACITY MEASURE: CPT

## 2018-12-10 PROCEDURE — G8998 SWALLOW D/C STATUS: HCPCS

## 2018-12-10 PROCEDURE — G8997 SWALLOW GOAL STATUS: HCPCS

## 2018-12-10 PROCEDURE — 36415 COLL VENOUS BLD VENIPUNCTURE: CPT

## 2018-12-10 RX ADMIN — CLONAZEPAM 1 MG: 0.5 TABLET ORAL at 21:41

## 2018-12-10 RX ADMIN — DIVALPROEX SODIUM 250 MG: 250 TABLET, DELAYED RELEASE ORAL at 21:42

## 2018-12-10 RX ADMIN — QUETIAPINE FUMARATE 100 MG: 100 TABLET ORAL at 09:04

## 2018-12-10 RX ADMIN — TETRAHYDROZOLINE HYDROCHLORIDE 1 DROP: 0.05 SOLUTION/ DROPS OPHTHALMIC at 15:17

## 2018-12-10 RX ADMIN — LACTULOSE 20 G: 20 SOLUTION ORAL at 21:41

## 2018-12-10 RX ADMIN — DOCUSATE SODIUM 100 MG: 100 CAPSULE, LIQUID FILLED ORAL at 21:42

## 2018-12-10 RX ADMIN — SODIUM CHLORIDE: 900 INJECTION, SOLUTION INTRAVENOUS at 23:43

## 2018-12-10 RX ADMIN — LORAZEPAM 0.5 MG: 2 INJECTION INTRAMUSCULAR; INTRAVENOUS at 15:17

## 2018-12-10 RX ADMIN — ENOXAPARIN SODIUM 40 MG: 100 INJECTION SUBCUTANEOUS at 09:04

## 2018-12-10 RX ADMIN — LACTULOSE 20 G: 20 SOLUTION ORAL at 09:05

## 2018-12-10 RX ADMIN — BUSPIRONE HYDROCHLORIDE 15 MG: 5 TABLET ORAL at 09:04

## 2018-12-10 RX ADMIN — TETRAHYDROZOLINE HYDROCHLORIDE 1 DROP: 0.05 SOLUTION/ DROPS OPHTHALMIC at 21:51

## 2018-12-10 RX ADMIN — BUSPIRONE HYDROCHLORIDE 15 MG: 5 TABLET ORAL at 21:42

## 2018-12-10 RX ADMIN — ASPIRIN 81 MG 81 MG: 81 TABLET ORAL at 09:04

## 2018-12-10 RX ADMIN — DIVALPROEX SODIUM 500 MG: 500 TABLET, EXTENDED RELEASE ORAL at 21:41

## 2018-12-10 RX ADMIN — AZITHROMYCIN MONOHYDRATE 500 MG: 500 INJECTION, POWDER, LYOPHILIZED, FOR SOLUTION INTRAVENOUS at 14:11

## 2018-12-10 RX ADMIN — DOCUSATE SODIUM 100 MG: 100 CAPSULE, LIQUID FILLED ORAL at 09:04

## 2018-12-10 RX ADMIN — CLONAZEPAM 1 MG: 0.5 TABLET ORAL at 15:17

## 2018-12-10 RX ADMIN — QUETIAPINE FUMARATE 100 MG: 100 TABLET ORAL at 21:41

## 2018-12-10 RX ADMIN — DIVALPROEX SODIUM 500 MG: 500 TABLET, EXTENDED RELEASE ORAL at 09:04

## 2018-12-10 RX ADMIN — CLONAZEPAM 1 MG: 0.5 TABLET ORAL at 09:04

## 2018-12-10 RX ADMIN — CEFTRIAXONE 1 G: 1 INJECTION, POWDER, FOR SOLUTION INTRAMUSCULAR; INTRAVENOUS at 11:56

## 2018-12-10 RX ADMIN — SODIUM CHLORIDE: 900 INJECTION, SOLUTION INTRAVENOUS at 11:10

## 2018-12-10 RX ADMIN — LACTULOSE 20 G: 20 SOLUTION ORAL at 15:17

## 2018-12-10 RX ADMIN — SODIUM CHLORIDE 100 ML/HR: 900 INJECTION, SOLUTION INTRAVENOUS at 00:06

## 2018-12-10 ASSESSMENT — PAIN SCALES - GENERAL: PAINLEVEL_OUTOF10: 0

## 2018-12-10 ASSESSMENT — PAIN SCALES - WONG BAKER
WONGBAKER_NUMERICALRESPONSE: 0

## 2018-12-10 NOTE — PROGRESS NOTES
Pt up to bsc with 2 assist,  cga verbal cues and guiding . Pt tolerated well with some wheezing after return to bed. Pt ate 100% of meal, feed. You can hand her cup to drink , but she drinks very fast, and spills. Pt took meds whole in applesauce, tolerated well.
Individual Concurrent Group Co-treatment   Time In  0         Time Out  0         Minutes  [place], [place]
swallow  3) lactic acidosis is better  4) dehydration better  5) ABD distension  AXR  6) hyponatremia resolved  7) DVT ppx      Dirk Sorensen MD  12/10/2018

## 2018-12-11 VITALS
OXYGEN SATURATION: 97 % | SYSTOLIC BLOOD PRESSURE: 148 MMHG | WEIGHT: 165.8 LBS | HEART RATE: 79 BPM | DIASTOLIC BLOOD PRESSURE: 64 MMHG | TEMPERATURE: 98.8 F | RESPIRATION RATE: 18 BRPM | BODY MASS INDEX: 32.38 KG/M2

## 2018-12-11 LAB
URINE CULTURE, ROUTINE: NORMAL
VALPROIC ACID % FREE: 25 % (ref 5–18)
VALPROIC ACID TOTAL: 112 UG/ML (ref 50–125)
VALPROIC ACID, FREE: 28 UG/ML (ref 7–23)

## 2018-12-11 PROCEDURE — 6360000002 HC RX W HCPCS: Performed by: INTERNAL MEDICINE

## 2018-12-11 PROCEDURE — 6370000000 HC RX 637 (ALT 250 FOR IP): Performed by: INTERNAL MEDICINE

## 2018-12-11 PROCEDURE — 2580000003 HC RX 258: Performed by: INTERNAL MEDICINE

## 2018-12-11 RX ORDER — CEFUROXIME AXETIL 500 MG/1
500 TABLET ORAL 2 TIMES DAILY
Qty: 14 TABLET | Refills: 0 | Status: SHIPPED | OUTPATIENT
Start: 2018-12-11 | End: 2018-12-18

## 2018-12-11 RX ORDER — AZITHROMYCIN 500 MG/1
500 TABLET, FILM COATED ORAL DAILY
Qty: 3 TABLET | Refills: 0 | Status: SHIPPED | OUTPATIENT
Start: 2018-12-11 | End: 2018-12-14

## 2018-12-11 RX ADMIN — ASPIRIN 81 MG 81 MG: 81 TABLET ORAL at 09:02

## 2018-12-11 RX ADMIN — LACTULOSE 20 G: 20 SOLUTION ORAL at 09:02

## 2018-12-11 RX ADMIN — DIVALPROEX SODIUM 500 MG: 500 TABLET, EXTENDED RELEASE ORAL at 09:02

## 2018-12-11 RX ADMIN — BUSPIRONE HYDROCHLORIDE 15 MG: 5 TABLET ORAL at 09:02

## 2018-12-11 RX ADMIN — ENOXAPARIN SODIUM 40 MG: 100 INJECTION SUBCUTANEOUS at 09:02

## 2018-12-11 RX ADMIN — CLONAZEPAM 1 MG: 0.5 TABLET ORAL at 09:02

## 2018-12-11 RX ADMIN — QUETIAPINE FUMARATE 100 MG: 100 TABLET ORAL at 09:02

## 2018-12-11 RX ADMIN — DOCUSATE SODIUM 100 MG: 100 CAPSULE, LIQUID FILLED ORAL at 09:02

## 2018-12-11 RX ADMIN — TETRAHYDROZOLINE HYDROCHLORIDE 1 DROP: 0.05 SOLUTION/ DROPS OPHTHALMIC at 09:02

## 2018-12-11 RX ADMIN — Medication 10 ML: at 09:02

## 2018-12-11 ASSESSMENT — PAIN SCALES - WONG BAKER
WONGBAKER_NUMERICALRESPONSE: 0

## 2018-12-13 ENCOUNTER — TELEPHONE (OUTPATIENT)
Dept: INTERNAL MEDICINE | Age: 78
End: 2018-12-13

## 2018-12-13 LAB
VALPROIC ACID % FREE: 24 % (ref 5–18)
VALPROIC ACID TOTAL: 106 UG/ML (ref 50–125)
VALPROIC ACID, FREE: 25 UG/ML (ref 7–23)

## 2018-12-14 LAB
BLOOD CULTURE, ROUTINE: NORMAL
CULTURE, BLOOD 2: NORMAL

## 2018-12-18 ENCOUNTER — OFFICE VISIT (OUTPATIENT)
Dept: INTERNAL MEDICINE | Age: 78
End: 2018-12-18
Payer: MEDICARE

## 2018-12-18 VITALS
WEIGHT: 166 LBS | DIASTOLIC BLOOD PRESSURE: 68 MMHG | SYSTOLIC BLOOD PRESSURE: 118 MMHG | HEART RATE: 92 BPM | OXYGEN SATURATION: 96 % | BODY MASS INDEX: 32.42 KG/M2

## 2018-12-18 DIAGNOSIS — Z23 NEED FOR TDAP VACCINATION: ICD-10-CM

## 2018-12-18 DIAGNOSIS — J18.9 PNEUMONIA OF LEFT LUNG DUE TO INFECTIOUS ORGANISM, UNSPECIFIED PART OF LUNG: Primary | ICD-10-CM

## 2018-12-18 DIAGNOSIS — E87.1 HYPONATREMIA: ICD-10-CM

## 2018-12-18 PROBLEM — H44.521 PHTHISIS BULBI OF RIGHT EYE: Status: ACTIVE | Noted: 2018-10-08

## 2018-12-18 PROBLEM — H21.02 HYPHEMA, LEFT EYE: Status: ACTIVE | Noted: 2018-10-08

## 2018-12-18 PROBLEM — H27.02 APHAKIA, LEFT EYE: Status: ACTIVE | Noted: 2018-10-08

## 2018-12-18 PROCEDURE — 1101F PT FALLS ASSESS-DOCD LE1/YR: CPT | Performed by: FAMILY MEDICINE

## 2018-12-18 PROCEDURE — G8417 CALC BMI ABV UP PARAM F/U: HCPCS | Performed by: FAMILY MEDICINE

## 2018-12-18 PROCEDURE — 1090F PRES/ABSN URINE INCON ASSESS: CPT | Performed by: FAMILY MEDICINE

## 2018-12-18 PROCEDURE — 99214 OFFICE O/P EST MOD 30 MIN: CPT | Performed by: FAMILY MEDICINE

## 2018-12-18 PROCEDURE — 1111F DSCHRG MED/CURRENT MED MERGE: CPT | Performed by: FAMILY MEDICINE

## 2018-12-18 PROCEDURE — G8427 DOCREV CUR MEDS BY ELIG CLIN: HCPCS | Performed by: FAMILY MEDICINE

## 2018-12-18 PROCEDURE — 90715 TDAP VACCINE 7 YRS/> IM: CPT | Performed by: FAMILY MEDICINE

## 2018-12-18 PROCEDURE — G8484 FLU IMMUNIZE NO ADMIN: HCPCS | Performed by: FAMILY MEDICINE

## 2018-12-18 PROCEDURE — 90471 IMMUNIZATION ADMIN: CPT | Performed by: FAMILY MEDICINE

## 2018-12-18 PROCEDURE — 1123F ACP DISCUSS/DSCN MKR DOCD: CPT | Performed by: FAMILY MEDICINE

## 2018-12-18 PROCEDURE — 1036F TOBACCO NON-USER: CPT | Performed by: FAMILY MEDICINE

## 2018-12-18 PROCEDURE — 4040F PNEUMOC VAC/ADMIN/RCVD: CPT | Performed by: FAMILY MEDICINE

## 2018-12-18 PROCEDURE — G8399 PT W/DXA RESULTS DOCUMENT: HCPCS | Performed by: FAMILY MEDICINE

## 2018-12-20 ENCOUNTER — TELEPHONE (OUTPATIENT)
Dept: INTERNAL MEDICINE | Age: 78
End: 2018-12-20

## 2018-12-20 DIAGNOSIS — R93.89 ABNORMAL CHEST X-RAY: Primary | ICD-10-CM

## 2018-12-20 LAB
ALBUMIN SERPL-MCNC: NORMAL G/DL
ALP BLD-CCNC: NORMAL U/L
ALT SERPL-CCNC: NORMAL U/L
ANION GAP SERPL CALCULATED.3IONS-SCNC: NORMAL MMOL/L
AST SERPL-CCNC: NORMAL U/L
BILIRUB SERPL-MCNC: NORMAL MG/DL (ref 0.1–1.4)
BUN BLDV-MCNC: 10 MG/DL
CALCIUM SERPL-MCNC: 8.8 MG/DL
CHLORIDE BLD-SCNC: 98 MMOL/L
CO2: NORMAL MMOL/L
CREAT SERPL-MCNC: 0.6 MG/DL
GFR CALCULATED: NORMAL
GLUCOSE BLD-MCNC: 81 MG/DL
POTASSIUM SERPL-SCNC: 135 MMOL/L
SODIUM BLD-SCNC: 135 MMOL/L
TOTAL PROTEIN: NORMAL

## 2018-12-21 ENCOUNTER — HOSPITAL ENCOUNTER (OUTPATIENT)
Age: 78
Discharge: HOME | End: 2018-12-23
Payer: MEDICARE

## 2018-12-21 ENCOUNTER — HOSPITAL ENCOUNTER (OUTPATIENT)
Dept: GENERAL RADIOLOGY | Age: 78
Discharge: HOME | End: 2018-12-23
Payer: MEDICARE

## 2018-12-21 DIAGNOSIS — R93.89 ABNORMAL CHEST X-RAY: ICD-10-CM

## 2018-12-21 PROCEDURE — 71046 X-RAY EXAM CHEST 2 VIEWS: CPT

## 2018-12-21 RX ORDER — AZITHROMYCIN 250 MG/1
250 TABLET, FILM COATED ORAL SEE ADMIN INSTRUCTIONS
Qty: 6 TABLET | Refills: 0 | Status: SHIPPED | OUTPATIENT
Start: 2018-12-21 | End: 2018-12-26

## 2019-01-15 DIAGNOSIS — M81.0 OSTEOPOROSIS, UNSPECIFIED OSTEOPOROSIS TYPE, UNSPECIFIED PATHOLOGICAL FRACTURE PRESENCE: Primary | ICD-10-CM

## 2019-02-07 LAB
CHOLESTEROL, TOTAL: 234 MG/DL
CHOLESTEROL/HDL RATIO: 3.5
HDLC SERPL-MCNC: 46 MG/DL (ref 35–70)
LDL CHOLESTEROL CALCULATED: 162 MG/DL (ref 0–160)
TRIGL SERPL-MCNC: 132 MG/DL
VLDLC SERPL CALC-MCNC: 26 MG/DL

## 2019-02-12 LAB
BASOPHILS ABSOLUTE: NORMAL /ΜL
BASOPHILS ABSOLUTE: NORMAL /ΜL
BASOPHILS RELATIVE PERCENT: NORMAL %
BASOPHILS RELATIVE PERCENT: NORMAL %
EOSINOPHILS ABSOLUTE: 2.3 /ΜL
EOSINOPHILS ABSOLUTE: NORMAL /ΜL
EOSINOPHILS RELATIVE PERCENT: NORMAL %
EOSINOPHILS RELATIVE PERCENT: NORMAL %
HCT VFR BLD CALC: 36.6 % (ref 36–46)
HCT VFR BLD CALC: 38.6 % (ref 36–46)
HEMOGLOBIN: 13.4 G/DL (ref 12–16)
HEMOGLOBIN: 13.4 G/DL (ref 12–16)
LYMPHOCYTES ABSOLUTE: 43.1 /ΜL
LYMPHOCYTES ABSOLUTE: NORMAL /ΜL
LYMPHOCYTES RELATIVE PERCENT: NORMAL %
LYMPHOCYTES RELATIVE PERCENT: NORMAL %
MCH RBC QN AUTO: 33.2 PG
MCH RBC QN AUTO: 33.2 PG
MCHC RBC AUTO-ENTMCNC: 34.7 G/DL
MCHC RBC AUTO-ENTMCNC: 34.7 G/DL
MCV RBC AUTO: 95.5 FL
MCV RBC AUTO: 95.5 FL
MONOCYTES ABSOLUTE: 11.9 /ΜL
MONOCYTES ABSOLUTE: NORMAL /ΜL
MONOCYTES RELATIVE PERCENT: NORMAL %
MONOCYTES RELATIVE PERCENT: NORMAL %
NEUTROPHILS ABSOLUTE: 42.1 /ΜL
NEUTROPHILS ABSOLUTE: NORMAL /ΜL
NEUTROPHILS RELATIVE PERCENT: 42.1 %
NEUTROPHILS RELATIVE PERCENT: NORMAL %
PLATELET # BLD: 96 K/ΜL
PLATELET # BLD: 96 K/ΜL
PMV BLD AUTO: 11.2 FL
PMV BLD AUTO: NORMAL FL
RBC # BLD: 6.1 10^6/ΜL
RBC # BLD: NORMAL 10^6/ΜL
WBC # BLD: 5.1 10^3/ML
WBC # BLD: 6.1 10^3/ML

## 2019-02-17 LAB
ALBUMIN SERPL-MCNC: NORMAL G/DL
ALP BLD-CCNC: NORMAL U/L
ALT SERPL-CCNC: 9 U/L
ANION GAP SERPL CALCULATED.3IONS-SCNC: NORMAL MMOL/L
AST SERPL-CCNC: 26 U/L
BILIRUB SERPL-MCNC: NORMAL MG/DL (ref 0.1–1.4)
BUN BLDV-MCNC: 24 MG/DL
CALCIUM SERPL-MCNC: 9.1 MG/DL
CHLORIDE BLD-SCNC: 99 MMOL/L
CO2: NORMAL MMOL/L
CREAT SERPL-MCNC: 0.6 MG/DL
GFR CALCULATED: NORMAL
GLUCOSE BLD-MCNC: 74 MG/DL
POTASSIUM SERPL-SCNC: 5.1 MMOL/L
SODIUM BLD-SCNC: 135 MMOL/L
TOTAL PROTEIN: 6.2

## 2019-03-21 ENCOUNTER — APPOINTMENT (OUTPATIENT)
Dept: GENERAL RADIOLOGY | Age: 79
End: 2019-03-21
Payer: MEDICARE

## 2019-03-21 ENCOUNTER — HOSPITAL ENCOUNTER (EMERGENCY)
Age: 79
Discharge: ANOTHER HOSPITAL | End: 2019-03-22
Attending: EMERGENCY MEDICINE
Payer: MEDICARE

## 2019-03-21 ENCOUNTER — APPOINTMENT (OUTPATIENT)
Dept: CT IMAGING | Age: 79
End: 2019-03-21
Payer: MEDICARE

## 2019-03-21 DIAGNOSIS — A41.9 SEPTICEMIA (HCC): Primary | ICD-10-CM

## 2019-03-21 DIAGNOSIS — J69.0 ASPIRATION PNEUMONITIS (HCC): ICD-10-CM

## 2019-03-21 DIAGNOSIS — K56.609 INTESTINAL OBSTRUCTION, UNSPECIFIED CAUSE, UNSPECIFIED WHETHER PARTIAL OR COMPLETE (HCC): ICD-10-CM

## 2019-03-21 LAB
ALBUMIN SERPL-MCNC: 4.2 G/DL (ref 3.5–4.6)
ALP BLD-CCNC: 72 U/L (ref 40–130)
ALT SERPL-CCNC: 13 U/L (ref 0–33)
ANION GAP SERPL CALCULATED.3IONS-SCNC: 19 MEQ/L (ref 9–15)
AST SERPL-CCNC: 24 U/L (ref 0–35)
BASOPHILS ABSOLUTE: 0 K/UL (ref 0–0.2)
BASOPHILS RELATIVE PERCENT: 0 %
BILIRUB SERPL-MCNC: 0.3 MG/DL (ref 0.2–0.7)
BUN BLDV-MCNC: 14 MG/DL (ref 8–23)
CALCIUM SERPL-MCNC: 9.2 MG/DL (ref 8.5–9.9)
CHLORIDE BLD-SCNC: 91 MEQ/L (ref 95–107)
CO2: 22 MEQ/L (ref 20–31)
CREAT SERPL-MCNC: 0.85 MG/DL (ref 0.5–0.9)
EOSINOPHILS ABSOLUTE: 0 K/UL (ref 0–0.7)
EOSINOPHILS RELATIVE PERCENT: 0.1 %
GFR AFRICAN AMERICAN: >60
GFR NON-AFRICAN AMERICAN: >60
GLOBULIN: 3 G/DL (ref 2.3–3.5)
GLUCOSE BLD-MCNC: 218 MG/DL (ref 70–99)
HCT VFR BLD CALC: 38.7 % (ref 37–47)
HEMOGLOBIN: 13.3 G/DL (ref 12–16)
LACTIC ACID: 6.6 MMOL/L (ref 0.5–2.2)
LYMPHOCYTES ABSOLUTE: 0.6 K/UL (ref 1–4.8)
LYMPHOCYTES RELATIVE PERCENT: 9.8 %
MAGNESIUM: 1.6 MG/DL (ref 1.7–2.4)
MCH RBC QN AUTO: 33 PG (ref 27–31.3)
MCHC RBC AUTO-ENTMCNC: 34.3 % (ref 33–37)
MCV RBC AUTO: 96.3 FL (ref 82–100)
MONOCYTES ABSOLUTE: 0.6 K/UL (ref 0.2–0.8)
MONOCYTES RELATIVE PERCENT: 9 %
NEUTROPHILS ABSOLUTE: 5.3 K/UL (ref 1.4–6.5)
NEUTROPHILS RELATIVE PERCENT: 81.1 %
PDW BLD-RTO: 13.1 % (ref 11.5–14.5)
PLATELET # BLD: 143 K/UL (ref 130–400)
POTASSIUM SERPL-SCNC: 3.9 MEQ/L (ref 3.4–4.9)
RBC # BLD: 4.02 M/UL (ref 4.2–5.4)
SODIUM BLD-SCNC: 132 MEQ/L (ref 135–144)
TOTAL PROTEIN: 7.2 G/DL (ref 6.3–8)
TROPONIN: <0.01 NG/ML (ref 0–0.01)
WBC # BLD: 6.5 K/UL (ref 4.8–10.8)

## 2019-03-21 PROCEDURE — 6360000002 HC RX W HCPCS: Performed by: EMERGENCY MEDICINE

## 2019-03-21 PROCEDURE — 94640 AIRWAY INHALATION TREATMENT: CPT

## 2019-03-21 PROCEDURE — 85025 COMPLETE CBC W/AUTO DIFF WBC: CPT

## 2019-03-21 PROCEDURE — 36415 COLL VENOUS BLD VENIPUNCTURE: CPT

## 2019-03-21 PROCEDURE — 83735 ASSAY OF MAGNESIUM: CPT

## 2019-03-21 PROCEDURE — 96375 TX/PRO/DX INJ NEW DRUG ADDON: CPT

## 2019-03-21 PROCEDURE — 6370000000 HC RX 637 (ALT 250 FOR IP): Performed by: EMERGENCY MEDICINE

## 2019-03-21 PROCEDURE — 80053 COMPREHEN METABOLIC PANEL: CPT

## 2019-03-21 PROCEDURE — 6360000004 HC RX CONTRAST MEDICATION

## 2019-03-21 PROCEDURE — 96374 THER/PROPH/DIAG INJ IV PUSH: CPT

## 2019-03-21 PROCEDURE — 87040 BLOOD CULTURE FOR BACTERIA: CPT

## 2019-03-21 PROCEDURE — 83605 ASSAY OF LACTIC ACID: CPT

## 2019-03-21 PROCEDURE — 99285 EMERGENCY DEPT VISIT HI MDM: CPT

## 2019-03-21 PROCEDURE — 71045 X-RAY EXAM CHEST 1 VIEW: CPT

## 2019-03-21 PROCEDURE — 84484 ASSAY OF TROPONIN QUANT: CPT

## 2019-03-21 PROCEDURE — 74177 CT ABD & PELVIS W/CONTRAST: CPT

## 2019-03-21 PROCEDURE — 2580000003 HC RX 258: Performed by: EMERGENCY MEDICINE

## 2019-03-21 RX ORDER — ATROPINE SULFATE 10 MG/G
OINTMENT OPHTHALMIC
COMMUNITY
Start: 2018-10-08 | End: 2019-03-27 | Stop reason: ALTCHOICE

## 2019-03-21 RX ORDER — METHYLPREDNISOLONE SODIUM SUCCINATE 125 MG/2ML
125 INJECTION, POWDER, LYOPHILIZED, FOR SOLUTION INTRAMUSCULAR; INTRAVENOUS ONCE
Status: COMPLETED | OUTPATIENT
Start: 2019-03-21 | End: 2019-03-21

## 2019-03-21 RX ORDER — IPRATROPIUM BROMIDE AND ALBUTEROL SULFATE 2.5; .5 MG/3ML; MG/3ML
1 SOLUTION RESPIRATORY (INHALATION) ONCE
Status: COMPLETED | OUTPATIENT
Start: 2019-03-21 | End: 2019-03-21

## 2019-03-21 RX ORDER — 0.9 % SODIUM CHLORIDE 0.9 %
1000 INTRAVENOUS SOLUTION INTRAVENOUS ONCE
Status: COMPLETED | OUTPATIENT
Start: 2019-03-21 | End: 2019-03-21

## 2019-03-21 RX ORDER — MAGNESIUM SULFATE 1 G/100ML
2 INJECTION INTRAVENOUS ONCE
Status: COMPLETED | OUTPATIENT
Start: 2019-03-21 | End: 2019-03-21

## 2019-03-21 RX ORDER — ACETAMINOPHEN 500 MG
1000 TABLET ORAL ONCE
Status: COMPLETED | OUTPATIENT
Start: 2019-03-21 | End: 2019-03-21

## 2019-03-21 RX ADMIN — METHYLPREDNISOLONE SODIUM SUCCINATE 125 MG: 125 INJECTION, POWDER, FOR SOLUTION INTRAMUSCULAR; INTRAVENOUS at 20:36

## 2019-03-21 RX ADMIN — IPRATROPIUM BROMIDE AND ALBUTEROL SULFATE 1 AMPULE: .5; 3 SOLUTION RESPIRATORY (INHALATION) at 20:39

## 2019-03-21 RX ADMIN — IOPAMIDOL 100 ML: 755 INJECTION, SOLUTION INTRAVENOUS at 21:13

## 2019-03-21 RX ADMIN — ACETAMINOPHEN 1000 MG: 500 TABLET ORAL at 19:37

## 2019-03-21 RX ADMIN — MAGNESIUM SULFATE HEPTAHYDRATE 2 G: 1 INJECTION, SOLUTION INTRAVENOUS at 21:05

## 2019-03-21 RX ADMIN — SODIUM CHLORIDE 1000 ML: 9 INJECTION, SOLUTION INTRAVENOUS at 20:56

## 2019-03-21 RX ADMIN — SODIUM CHLORIDE 1000 ML: 9 INJECTION, SOLUTION INTRAVENOUS at 20:14

## 2019-03-21 ASSESSMENT — ENCOUNTER SYMPTOMS
VOMITING: 0
EYE PAIN: 0
COLOR CHANGE: 0
SINUS PRESSURE: 0
NAUSEA: 0
COUGH: 1
SORE THROAT: 0
SHORTNESS OF BREATH: 1
CONSTIPATION: 0
BACK PAIN: 0
ABDOMINAL DISTENTION: 0
DIARRHEA: 0
RHINORRHEA: 0
APNEA: 0
PHOTOPHOBIA: 0
ABDOMINAL PAIN: 0
WHEEZING: 0

## 2019-03-22 ENCOUNTER — APPOINTMENT (OUTPATIENT)
Dept: GENERAL RADIOLOGY | Age: 79
DRG: 389 | End: 2019-03-22
Attending: INTERNAL MEDICINE
Payer: MEDICARE

## 2019-03-22 ENCOUNTER — ANESTHESIA (OUTPATIENT)
Dept: OPERATING ROOM | Age: 79
DRG: 389 | End: 2019-03-22
Payer: MEDICARE

## 2019-03-22 ENCOUNTER — HOSPITAL ENCOUNTER (INPATIENT)
Age: 79
LOS: 2 days | Discharge: HOME | DRG: 389 | End: 2019-03-24
Attending: INTERNAL MEDICINE | Admitting: INTERNAL MEDICINE
Payer: MEDICARE

## 2019-03-22 ENCOUNTER — ANESTHESIA EVENT (OUTPATIENT)
Dept: OPERATING ROOM | Age: 79
DRG: 389 | End: 2019-03-22
Payer: MEDICARE

## 2019-03-22 VITALS
WEIGHT: 166 LBS | DIASTOLIC BLOOD PRESSURE: 60 MMHG | BODY MASS INDEX: 32.42 KG/M2 | RESPIRATION RATE: 24 BRPM | HEART RATE: 88 BPM | OXYGEN SATURATION: 96 % | TEMPERATURE: 99.5 F | SYSTOLIC BLOOD PRESSURE: 125 MMHG

## 2019-03-22 VITALS
SYSTOLIC BLOOD PRESSURE: 126 MMHG | RESPIRATION RATE: 23 BRPM | DIASTOLIC BLOOD PRESSURE: 68 MMHG | OXYGEN SATURATION: 99 %

## 2019-03-22 PROBLEM — K56.609 SBO (SMALL BOWEL OBSTRUCTION) (HCC): Status: ACTIVE | Noted: 2019-03-22

## 2019-03-22 LAB
ANION GAP SERPL CALCULATED.3IONS-SCNC: 14 MEQ/L (ref 9–15)
BACTERIA: NORMAL /HPF
BASOPHILS ABSOLUTE: 0 K/UL (ref 0–0.2)
BASOPHILS RELATIVE PERCENT: 0.1 %
BILIRUBIN URINE: NEGATIVE
BLOOD, URINE: ABNORMAL
BUN BLDV-MCNC: 9 MG/DL (ref 8–23)
CALCIUM SERPL-MCNC: 8.7 MG/DL (ref 8.5–9.9)
CHLORIDE BLD-SCNC: 102 MEQ/L (ref 95–107)
CLARITY: CLEAR
CO2: 22 MEQ/L (ref 20–31)
COLOR: YELLOW
CREAT SERPL-MCNC: 0.49 MG/DL (ref 0.5–0.9)
EKG ATRIAL RATE: 87 BPM
EKG P AXIS: 29 DEGREES
EKG P-R INTERVAL: 192 MS
EKG Q-T INTERVAL: 396 MS
EKG QRS DURATION: 90 MS
EKG QTC CALCULATION (BAZETT): 476 MS
EKG R AXIS: -19 DEGREES
EKG T AXIS: 24 DEGREES
EKG VENTRICULAR RATE: 87 BPM
EOSINOPHILS ABSOLUTE: 0 K/UL (ref 0–0.7)
EOSINOPHILS RELATIVE PERCENT: 0 %
GFR AFRICAN AMERICAN: >60
GFR NON-AFRICAN AMERICAN: >60
GLUCOSE BLD-MCNC: 162 MG/DL (ref 70–99)
GLUCOSE URINE: NEGATIVE MG/DL
HCT VFR BLD CALC: 34.9 % (ref 37–47)
HEMOGLOBIN: 11.9 G/DL (ref 12–16)
INR BLD: 1.1
KETONES, URINE: 15 MG/DL
LEUKOCYTE ESTERASE, URINE: NEGATIVE
LYMPHOCYTES ABSOLUTE: 0.8 K/UL (ref 1–4.8)
LYMPHOCYTES RELATIVE PERCENT: 10.1 %
MCH RBC QN AUTO: 33.2 PG (ref 27–31.3)
MCHC RBC AUTO-ENTMCNC: 34.1 % (ref 33–37)
MCV RBC AUTO: 97.2 FL (ref 82–100)
MONOCYTES ABSOLUTE: 0.5 K/UL (ref 0.2–0.8)
MONOCYTES RELATIVE PERCENT: 6.5 %
NEUTROPHILS ABSOLUTE: 6.4 K/UL (ref 1.4–6.5)
NEUTROPHILS RELATIVE PERCENT: 83.3 %
NITRITE, URINE: NEGATIVE
PDW BLD-RTO: 13.2 % (ref 11.5–14.5)
PH UA: 5.5 (ref 5–9)
PLATELET # BLD: 104 K/UL (ref 130–400)
POTASSIUM SERPL-SCNC: 4.7 MEQ/L (ref 3.4–4.9)
PROTEIN UA: NEGATIVE MG/DL
PROTHROMBIN TIME: 10.7 SEC (ref 9–11.5)
RBC # BLD: 3.59 M/UL (ref 4.2–5.4)
RBC UA: NORMAL /HPF (ref 0–2)
SODIUM BLD-SCNC: 138 MEQ/L (ref 135–144)
SPECIFIC GRAVITY UA: 1.01 (ref 1–1.03)
URINE REFLEX TO CULTURE: YES
UROBILINOGEN, URINE: 0.2 E.U./DL
WBC # BLD: 7.7 K/UL (ref 4.8–10.8)
WBC UA: NORMAL /HPF (ref 0–5)

## 2019-03-22 PROCEDURE — 45378 DIAGNOSTIC COLONOSCOPY: CPT | Performed by: COLON & RECTAL SURGERY

## 2019-03-22 PROCEDURE — 81001 URINALYSIS AUTO W/SCOPE: CPT

## 2019-03-22 PROCEDURE — 7100000001 HC PACU RECOVERY - ADDTL 15 MIN: Performed by: COLON & RECTAL SURGERY

## 2019-03-22 PROCEDURE — 2500000003 HC RX 250 WO HCPCS: Performed by: COLON & RECTAL SURGERY

## 2019-03-22 PROCEDURE — 87086 URINE CULTURE/COLONY COUNT: CPT

## 2019-03-22 PROCEDURE — 93005 ELECTROCARDIOGRAM TRACING: CPT

## 2019-03-22 PROCEDURE — 3700000000 HC ANESTHESIA ATTENDED CARE: Performed by: COLON & RECTAL SURGERY

## 2019-03-22 PROCEDURE — 2580000003 HC RX 258: Performed by: NURSE ANESTHETIST, CERTIFIED REGISTERED

## 2019-03-22 PROCEDURE — 99222 1ST HOSP IP/OBS MODERATE 55: CPT | Performed by: COLON & RECTAL SURGERY

## 2019-03-22 PROCEDURE — 0D9670Z DRAINAGE OF STOMACH WITH DRAINAGE DEVICE, VIA NATURAL OR ARTIFICIAL OPENING: ICD-10-PCS | Performed by: RADIOLOGY

## 2019-03-22 PROCEDURE — 93010 ELECTROCARDIOGRAM REPORT: CPT | Performed by: INTERNAL MEDICINE

## 2019-03-22 PROCEDURE — 85610 PROTHROMBIN TIME: CPT

## 2019-03-22 PROCEDURE — 6360000002 HC RX W HCPCS: Performed by: NURSE ANESTHETIST, CERTIFIED REGISTERED

## 2019-03-22 PROCEDURE — 2709999900 HC NON-CHARGEABLE SUPPLY: Performed by: COLON & RECTAL SURGERY

## 2019-03-22 PROCEDURE — 2500000003 HC RX 250 WO HCPCS: Performed by: INTERNAL MEDICINE

## 2019-03-22 PROCEDURE — 7100000000 HC PACU RECOVERY - FIRST 15 MIN: Performed by: COLON & RECTAL SURGERY

## 2019-03-22 PROCEDURE — 2580000003 HC RX 258: Performed by: COLON & RECTAL SURGERY

## 2019-03-22 PROCEDURE — 0DJD8ZZ INSPECTION OF LOWER INTESTINAL TRACT, VIA NATURAL OR ARTIFICIAL OPENING ENDOSCOPIC: ICD-10-PCS | Performed by: COLON & RECTAL SURGERY

## 2019-03-22 PROCEDURE — 1210000000 HC MED SURG R&B

## 2019-03-22 PROCEDURE — 80048 BASIC METABOLIC PNL TOTAL CA: CPT

## 2019-03-22 PROCEDURE — 76000 FLUOROSCOPY <1 HR PHYS/QHP: CPT

## 2019-03-22 PROCEDURE — 3609027000 HC COLONOSCOPY: Performed by: COLON & RECTAL SURGERY

## 2019-03-22 PROCEDURE — 3700000001 HC ADD 15 MINUTES (ANESTHESIA): Performed by: COLON & RECTAL SURGERY

## 2019-03-22 PROCEDURE — 71045 X-RAY EXAM CHEST 1 VIEW: CPT

## 2019-03-22 PROCEDURE — 85025 COMPLETE CBC W/AUTO DIFF WBC: CPT

## 2019-03-22 PROCEDURE — 2500000003 HC RX 250 WO HCPCS: Performed by: NURSE ANESTHETIST, CERTIFIED REGISTERED

## 2019-03-22 PROCEDURE — 36415 COLL VENOUS BLD VENIPUNCTURE: CPT

## 2019-03-22 PROCEDURE — 2580000003 HC RX 258: Performed by: INTERNAL MEDICINE

## 2019-03-22 PROCEDURE — 6360000002 HC RX W HCPCS: Performed by: INTERNAL MEDICINE

## 2019-03-22 PROCEDURE — 6370000000 HC RX 637 (ALT 250 FOR IP): Performed by: COLON & RECTAL SURGERY

## 2019-03-22 RX ORDER — SODIUM PHOSPHATE, DIBASIC AND SODIUM PHOSPHATE, MONOBASIC 7; 19 G/133ML; G/133ML
1 ENEMA RECTAL ONCE
Status: COMPLETED | OUTPATIENT
Start: 2019-03-22 | End: 2019-03-22

## 2019-03-22 RX ORDER — SODIUM CHLORIDE 0.9 % (FLUSH) 0.9 %
10 SYRINGE (ML) INJECTION PRN
Status: DISCONTINUED | OUTPATIENT
Start: 2019-03-22 | End: 2019-03-22

## 2019-03-22 RX ORDER — SODIUM CHLORIDE, SODIUM LACTATE, POTASSIUM CHLORIDE, CALCIUM CHLORIDE 600; 310; 30; 20 MG/100ML; MG/100ML; MG/100ML; MG/100ML
INJECTION, SOLUTION INTRAVENOUS CONTINUOUS
Status: DISCONTINUED | OUTPATIENT
Start: 2019-03-22 | End: 2019-03-24

## 2019-03-22 RX ORDER — SODIUM CHLORIDE 9 MG/ML
INJECTION, SOLUTION INTRAVENOUS CONTINUOUS
Status: DISCONTINUED | OUTPATIENT
Start: 2019-03-22 | End: 2019-03-22

## 2019-03-22 RX ORDER — HYDROCODONE BITARTRATE AND ACETAMINOPHEN 5; 325 MG/1; MG/1
2 TABLET ORAL PRN
Status: DISCONTINUED | OUTPATIENT
Start: 2019-03-22 | End: 2019-03-22

## 2019-03-22 RX ORDER — ONDANSETRON 2 MG/ML
4 INJECTION INTRAMUSCULAR; INTRAVENOUS EVERY 6 HOURS PRN
Status: DISCONTINUED | OUTPATIENT
Start: 2019-03-22 | End: 2019-03-24 | Stop reason: HOSPADM

## 2019-03-22 RX ORDER — MEPERIDINE HYDROCHLORIDE 25 MG/ML
12.5 INJECTION INTRAMUSCULAR; INTRAVENOUS; SUBCUTANEOUS EVERY 5 MIN PRN
Status: DISCONTINUED | OUTPATIENT
Start: 2019-03-22 | End: 2019-03-22

## 2019-03-22 RX ORDER — SODIUM CHLORIDE 0.9 % (FLUSH) 0.9 %
10 SYRINGE (ML) INJECTION EVERY 12 HOURS SCHEDULED
Status: DISCONTINUED | OUTPATIENT
Start: 2019-03-22 | End: 2019-03-22

## 2019-03-22 RX ORDER — SODIUM CHLORIDE 0.9 % (FLUSH) 0.9 %
10 SYRINGE (ML) INJECTION EVERY 12 HOURS SCHEDULED
Status: DISCONTINUED | OUTPATIENT
Start: 2019-03-22 | End: 2019-03-24 | Stop reason: HOSPADM

## 2019-03-22 RX ORDER — METOCLOPRAMIDE HYDROCHLORIDE 5 MG/ML
10 INJECTION INTRAMUSCULAR; INTRAVENOUS
Status: DISCONTINUED | OUTPATIENT
Start: 2019-03-22 | End: 2019-03-22

## 2019-03-22 RX ORDER — MAGNESIUM HYDROXIDE 1200 MG/15ML
LIQUID ORAL PRN
Status: DISCONTINUED | OUTPATIENT
Start: 2019-03-22 | End: 2019-03-22 | Stop reason: ALTCHOICE

## 2019-03-22 RX ORDER — DIPHENHYDRAMINE HYDROCHLORIDE 50 MG/ML
12.5 INJECTION INTRAMUSCULAR; INTRAVENOUS
Status: DISCONTINUED | OUTPATIENT
Start: 2019-03-22 | End: 2019-03-22

## 2019-03-22 RX ORDER — LIDOCAINE HYDROCHLORIDE 10 MG/ML
1 INJECTION, SOLUTION EPIDURAL; INFILTRATION; INTRACAUDAL; PERINEURAL
Status: DISCONTINUED | OUTPATIENT
Start: 2019-03-22 | End: 2019-03-22

## 2019-03-22 RX ORDER — MIDAZOLAM HYDROCHLORIDE 1 MG/ML
INJECTION INTRAMUSCULAR; INTRAVENOUS PRN
Status: DISCONTINUED | OUTPATIENT
Start: 2019-03-22 | End: 2019-03-22 | Stop reason: SDUPTHER

## 2019-03-22 RX ORDER — PROPOFOL 10 MG/ML
INJECTION, EMULSION INTRAVENOUS PRN
Status: DISCONTINUED | OUTPATIENT
Start: 2019-03-22 | End: 2019-03-22 | Stop reason: SDUPTHER

## 2019-03-22 RX ORDER — LIDOCAINE HYDROCHLORIDE 20 MG/ML
INJECTION, SOLUTION INFILTRATION; PERINEURAL PRN
Status: DISCONTINUED | OUTPATIENT
Start: 2019-03-22 | End: 2019-03-22 | Stop reason: SDUPTHER

## 2019-03-22 RX ORDER — SODIUM CHLORIDE, SODIUM LACTATE, POTASSIUM CHLORIDE, CALCIUM CHLORIDE 600; 310; 30; 20 MG/100ML; MG/100ML; MG/100ML; MG/100ML
INJECTION, SOLUTION INTRAVENOUS CONTINUOUS PRN
Status: DISCONTINUED | OUTPATIENT
Start: 2019-03-22 | End: 2019-03-22 | Stop reason: SDUPTHER

## 2019-03-22 RX ORDER — ONDANSETRON 2 MG/ML
4 INJECTION INTRAMUSCULAR; INTRAVENOUS
Status: DISCONTINUED | OUTPATIENT
Start: 2019-03-22 | End: 2019-03-22

## 2019-03-22 RX ORDER — BISACODYL 10 MG
10 SUPPOSITORY, RECTAL RECTAL DAILY PRN
Status: DISCONTINUED | OUTPATIENT
Start: 2019-03-22 | End: 2019-03-24 | Stop reason: HOSPADM

## 2019-03-22 RX ORDER — LORAZEPAM 2 MG/ML
1 INJECTION INTRAMUSCULAR EVERY 6 HOURS PRN
Status: DISCONTINUED | OUTPATIENT
Start: 2019-03-22 | End: 2019-03-24 | Stop reason: HOSPADM

## 2019-03-22 RX ORDER — FENTANYL CITRATE 50 UG/ML
50 INJECTION, SOLUTION INTRAMUSCULAR; INTRAVENOUS EVERY 10 MIN PRN
Status: DISCONTINUED | OUTPATIENT
Start: 2019-03-22 | End: 2019-03-22

## 2019-03-22 RX ORDER — SODIUM CHLORIDE 0.9 % (FLUSH) 0.9 %
10 SYRINGE (ML) INJECTION PRN
Status: DISCONTINUED | OUTPATIENT
Start: 2019-03-22 | End: 2019-03-24 | Stop reason: HOSPADM

## 2019-03-22 RX ORDER — SODIUM CHLORIDE, SODIUM LACTATE, POTASSIUM CHLORIDE, CALCIUM CHLORIDE 600; 310; 30; 20 MG/100ML; MG/100ML; MG/100ML; MG/100ML
INJECTION, SOLUTION INTRAVENOUS CONTINUOUS
Status: DISCONTINUED | OUTPATIENT
Start: 2019-03-22 | End: 2019-03-22

## 2019-03-22 RX ORDER — HYDROCODONE BITARTRATE AND ACETAMINOPHEN 5; 325 MG/1; MG/1
1 TABLET ORAL PRN
Status: DISCONTINUED | OUTPATIENT
Start: 2019-03-22 | End: 2019-03-22

## 2019-03-22 RX ORDER — PROPOFOL 10 MG/ML
INJECTION, EMULSION INTRAVENOUS CONTINUOUS PRN
Status: DISCONTINUED | OUTPATIENT
Start: 2019-03-22 | End: 2019-03-22 | Stop reason: SDUPTHER

## 2019-03-22 RX ADMIN — PROPOFOL 30 MG: 10 INJECTION, EMULSION INTRAVENOUS at 15:43

## 2019-03-22 RX ADMIN — Medication 1 ENEMA: at 14:01

## 2019-03-22 RX ADMIN — PROPOFOL 30 MG: 10 INJECTION, EMULSION INTRAVENOUS at 15:50

## 2019-03-22 RX ADMIN — LIDOCAINE HYDROCHLORIDE 60 MG: 20 INJECTION, SOLUTION INFILTRATION; PERINEURAL at 15:41

## 2019-03-22 RX ADMIN — VALPROATE SODIUM 250 MG: 100 INJECTION, SOLUTION INTRAVENOUS at 21:07

## 2019-03-22 RX ADMIN — PROPOFOL 30 MG: 10 INJECTION, EMULSION INTRAVENOUS at 15:41

## 2019-03-22 RX ADMIN — PROPOFOL 30 MG: 10 INJECTION, EMULSION INTRAVENOUS at 15:46

## 2019-03-22 RX ADMIN — SODIUM CHLORIDE, POTASSIUM CHLORIDE, SODIUM LACTATE AND CALCIUM CHLORIDE: 600; 310; 30; 20 INJECTION, SOLUTION INTRAVENOUS at 04:18

## 2019-03-22 RX ADMIN — VALPROATE SODIUM 250 MG: 100 INJECTION, SOLUTION INTRAVENOUS at 04:34

## 2019-03-22 RX ADMIN — PROPOFOL 70 MCG/KG/MIN: 10 INJECTION, EMULSION INTRAVENOUS at 15:43

## 2019-03-22 RX ADMIN — MIDAZOLAM HYDROCHLORIDE 2 MG: 1 INJECTION, SOLUTION INTRAMUSCULAR; INTRAVENOUS at 15:37

## 2019-03-22 RX ADMIN — Medication 10 ML: at 21:06

## 2019-03-22 RX ADMIN — SODIUM CHLORIDE, POTASSIUM CHLORIDE, SODIUM LACTATE AND CALCIUM CHLORIDE: 600; 310; 30; 20 INJECTION, SOLUTION INTRAVENOUS at 15:27

## 2019-03-22 RX ADMIN — ENOXAPARIN SODIUM 40 MG: 40 INJECTION SUBCUTANEOUS at 10:13

## 2019-03-22 ASSESSMENT — PULMONARY FUNCTION TESTS
PIF_VALUE: 1
PIF_VALUE: 0
PIF_VALUE: 1
PIF_VALUE: 1
PIF_VALUE: 0
PIF_VALUE: 1
PIF_VALUE: 1
PIF_VALUE: 0
PIF_VALUE: 1
PIF_VALUE: 1
PIF_VALUE: 9
PIF_VALUE: 1
PIF_VALUE: 0
PIF_VALUE: 0

## 2019-03-22 NOTE — PROGRESS NOTES
Abdomen soft, faint flatus noted. Reassurance offered. Pt pulling blanket up over head. Sao2 98% on 3L nc. MP RSR.

## 2019-03-22 NOTE — PROGRESS NOTES
Arrived to unit @ 0121 via stretcher. Patient is MRDD, blind and nonverbal. Unable to complete admission assessment. Medication list sent from facility. Needs anticipated. Safety maintained. NO episodes of emesis noted. Abdomen soft, non- distended. Johnston to CD draining clear yellow urine.

## 2019-03-22 NOTE — PROGRESS NOTES
Pt is blind and nonverbal. She has ngt to right nare. abd mildly distended. Fleet enema given. Pt had large bowel movement. She will have a flexible sigmoidoscopy with possible colonic stent placement with Dr. Jabari Reyes today. Telephone consent obtained with pt's legal guardian Brent Linda. Pt received lovenox prior to having planned the procedure- Dr. Jabari Reyes notified.      Electronically signed by Emre Ramos RN on 3/22/2019 at 2:37 PM

## 2019-03-22 NOTE — PLAN OF CARE
Problem: Risk for Impaired Skin Integrity  Goal: Tissue integrity - skin and mucous membranes  Description  Structural intactness and normal physiological function of skin and  mucous membranes.   Outcome: Ongoing

## 2019-03-22 NOTE — PROGRESS NOTES
Hospitalist Progress Note      PCP: Shauna Zazueta MD    Date of Admission: 3/22/2019    Chief Complaint:  No acute events,afebrile,stable HD    Medications:  Reviewed    Infusion Medications    lactated ringers 125 mL/hr at 03/22/19 0418     Scheduled Medications    sodium chloride flush  10 mL Intravenous 2 times per day    enoxaparin  40 mg Subcutaneous Daily    valproate sodium (DEPACON) IVPB  250 mg Intravenous Q12H    fleet  1 enema Rectal Once     PRN Meds: sodium chloride flush, ondansetron, bisacodyl, LORazepam      Intake/Output Summary (Last 24 hours) at 3/22/2019 1329  Last data filed at 3/22/2019 0555  Gross per 24 hour   Intake 302.08 ml   Output 400 ml   Net -97.92 ml       Exam:    BP (!) 134/56   Pulse 88   Temp 98.6 °F (37 °C) (Oral)   Resp 19   Wt 164 lb 1.6 oz (74.4 kg)   SpO2 96%   BMI 32.05 kg/m²     General appearance: awake, NG in place, nonverbal  Respiratory:  Clear to auscultation, bilaterally without Rales/Wheezes/Rhonchi. Cardiovascular: Regular rate and rhythm with normal S1/S2 . Abdomen: Soft, active bowel sounds. Musculoskeletal: No edema bilaterally. Labs:   Recent Labs     03/21/19 1921 03/22/19  0657   WBC 6.5 7.7   HGB 13.3 11.9*   HCT 38.7 34.9*    104*     Recent Labs     03/21/19 1921 03/22/19  0657   * 138   K 3.9 4.7   CL 91* 102   CO2 22 22   BUN 14 9   CREATININE 0.85 0.49*   CALCIUM 9.2 8.7     Recent Labs     03/21/19 1921   AST 24   ALT 13   BILITOT 0.3   ALKPHOS 72     Recent Labs     03/22/19  0657   INR 1.1     Recent Labs     03/21/19 1921   TROPONINI <0.010       Urinalysis:      Lab Results   Component Value Date    NITRU Negative 03/22/2019    WBCUA 0-2 03/22/2019    BACTERIA None 03/22/2019    RBCUA 0-2 03/22/2019    BLOODU Trace-lysed 03/22/2019    SPECGRAV 1.010 03/22/2019    GLUCOSEU Negative 03/22/2019    GLUCOSEU NEG 03/02/2012       Radiology:  XR Chest Abdomen Ng Placement   Final Result   NG TUBE TIP IS IN THE STOMACH. LEFT LOWER LUNG INFILTRATE.                  Assessment/Plan:    78 y.o. female group home resident with a history of severe MRDD, nonverbal, mood disorder, seizure disorder who presented with :    Small and large bowel obstruction  - due to anastomotic stricture  - NPO, IV hydration  - plan for sigmoidoscopy with colonic stent placement per surgical service    Hyponatremia   - improved    Hyperglycemia   - monitor          Electronically signed by Yary Arriaza MD on 3/22/2019 at 1:29 PM

## 2019-03-22 NOTE — PLAN OF CARE
Problem: Risk for Impaired Skin Integrity  Goal: Tissue integrity - skin and mucous membranes  Description  Structural intactness and normal physiological function of skin and  mucous membranes.   Outcome: Ongoing     Problem: Falls - Risk of:  Goal: Will remain free from falls  Description  Will remain free from falls  3/22/2019 0430 by Valentine Coley RN  Outcome: Ongoing  3/22/2019 0430 by Valentine Coley RN  Outcome: Ongoing

## 2019-03-22 NOTE — PROGRESS NOTES
Received from or into pacu on a cart accompanied in by Georgian Angelucci. Crna. O2 on at 6L mask. Faint expiratory wheeze auscultated anteriorly. MP RSR. Abdomen soft. Reassurance provided.

## 2019-03-22 NOTE — BRIEF OP NOTE
Department of General Surgery - Adult  Surgical Service colorectal surgery  Brief Operative Report      Pre-operative Diagnosis:  Large bowel obstruction with anastomotic stricture    Post-operative Diagnosis:  Normal colonoscopy with patent anastomotic stricture    Procedure:  Colonoscopy    Surgeon:  Eleazar Sam    Anesthesia:  Mac    Estimated blood loss:  None    Blood Transfusion?:  No    Total Urine Output:  Not recorded     Drains:  None    Specimens:  None    Implants:  None      Findings:  Patent rectosigmoid anastomosis. Colon normal to ascending colon    Complications:  None    Condition:  stable    See dictated operative report for full details.

## 2019-03-22 NOTE — CONSULTS
injection 1 mg, 1 mg, Intravenous, Q6H PRN  lactated ringers infusion, , Intravenous, Continuous  fleet rectal enema 1 enema, 1 enema, Rectal, Once  Allergies:  Patient has no known allergies. Social History:   TOBACCO:   reports that she has never smoked. She has never used smokeless tobacco.  ETOH:   reports that she does not drink alcohol. DRUGS:   reports that she does not use drugs. Family History:   No family history on file. REVIEW OF SYSTEMS:    Review of systems not obtained due to patient factors - mental status    PHYSICAL EXAM:    VITALS:  BP (!) 134/56   Pulse 88   Temp 98.6 °F (37 °C) (Oral)   Resp 19   Wt 164 lb 1.6 oz (74.4 kg)   SpO2 96%   BMI 32.05 kg/m²   CONSTITUTIONAL:  Awake  EYES:  Lids and lashes normal, pupils equal, round and reactive to light, extra ocular muscles intact, sclera clear, conjunctiva normal  ENT:  Normocephalic, without obvious abnormality, atraumatic, sinuses nontender on palpation, external ears without lesions, oral pharynx with moist mucus membranes, tonsils without erythema or exudates, gums normal and good dentition. BACK:  Symmetric, no curvature, spinous processes are non-tender on palpation, paraspinous muscles are non-tender on palpation, no costal vertebral tenderness  LUNGS:  No increased work of breathing, good air exchange, clear to auscultation bilaterally, no crackles or wheezing  CARDIOVASCULAR:  Normal apical impulse, regular rate and rhythm, normal S1 and S2, no S3 or S4, and no murmur noted  ABDOMEN:  Abdomen soft and currently not distended. No abdominal wall hernias present,  Nasogastric tube in place  MUSCULOSKELETAL:  There is no redness, warmth, or swelling of the joints. Full range of motion noted. Motor strength is 5 out of 5 all extremities bilaterally.   Tone is normal.  NEUROLOGIC:  Moves all extremities  SKIN:  no bruising or bleeding  DATA:    CBC:   Lab Results   Component Value Date    WBC 7.7 03/22/2019    RBC 3.59 03/22/2019    RBC 3.65 04/12/2012    HGB 11.9 03/22/2019    HCT 34.9 03/22/2019    MCV 97.2 03/22/2019    MCH 33.2 03/22/2019    MCHC 34.1 03/22/2019    RDW 13.2 03/22/2019     03/22/2019    MPV 9.9 08/09/2018    MPV 9.9 08/09/2018     BMP:    Lab Results   Component Value Date     03/22/2019    K 4.7 03/22/2019    K 4.1 12/10/2018     03/22/2019    CO2 22 03/22/2019    BUN 9 03/22/2019    LABALBU 4.2 03/21/2019    LABALBU 3.6 03/13/2012    CREATININE 0.49 03/22/2019    CALCIUM 8.7 03/22/2019    GFRAA >60.0 03/22/2019    LABGLOM >60.0 03/22/2019    GLUCOSE 162 03/22/2019    GLUCOSE 85 03/13/2012     Radiology Review:  CT scan of the abdomen    IMPRESSION/RECOMMENDATIONS:      Large bowel obstruction secondary to anastomotic stricture. Possible concerns include previous diverticulitis or possible cancer in the past although records not readily available from that operation many years ago. I talked about the issue with Lotus Gooden who is her power of . I discussed doing the least invasive procedure that might help her with her large bowel obstruction. I do not wish to proceed with a large abdominal operation and colostomy a cousin would not be in this patient's best interest.  She is in complete agreement. I discussed the risks and benefits of flexible sigmoidoscopy with colonic stent placement for relief of obstruction. Although done endoscopically, there are risks of this procedure including infection, bleeding, migration of stent, and perforation of the colon. All these risks were well outlined. She is in agreement with proceeding with stent placement if possible to relieve anastomotic stricture and obstruction. Consent obtained. She will have a fleets enema to assist with cleaning her rectum out prior to attempted stent placement.

## 2019-03-22 NOTE — H&P
into the lungs as needed. ADMINISTER VIA N/C.  TITRATE PRN TO MAINTAIN PULSE OX ABOVE 92%. Indications: Hypoxia   Yes Historical Provider, MD   Cetirizine HCl 10 MG CAPS Take 10 mg by mouth daily. 7/3/12  Yes EDDIE Bowman   niacin (NIASPAN) 500 MG CR tablet Take 500 mg by mouth nightly. Indications: Dyslipidemia   Yes Historical Provider, MD   acetaminophen (TYLENOL) 325 MG tablet Take 650 mg by mouth every 4 hours as needed. May give rectal if unable to tolerate PO administration. Indications: Fever, Pain   Yes Historical Provider, MD   Magnesium Hydroxide (MILK OF MAGNESIA PO) Take 30 mLs by mouth daily as needed. Indications: Constipation   Yes Historical Provider, MD   hydrocortisone (ANUSOL-HC) 2.5 % rectal cream Place 2.5 g rectally 3 times daily as needed. Indications: Hemorrhoids   Yes Historical Provider, MD   Divalproex Sodium (DEPAKOTE ER PO) Take 500 mg by mouth 2 times daily    Yes Historical Provider, MD   docusate sodium (COLACE) 100 MG capsule Take 100 mg by mouth 2 times daily. Hold for loose stools. Indications: Chronic Constipation   Yes Historical Provider, MD   fexofenadine (ALLEGRA) 180 MG tablet Take 180 mg by mouth daily. Indications: Allergic Rhinitis   Yes Historical Provider, MD   fluticasone (FLONASE) 50 MCG/ACT nasal spray 1 spray by Nasal route 2 times daily. Indications: Allergic Rhinitis   Yes Historical Provider, MD   Multiple Vitamin (MULTIVITAMIN PO) Take 1 tablet by mouth daily. Indications: multivitamin   Yes Historical Provider, MD   vitamin D (ERGOCALCIFEROL) 46740 UNITS CAPS capsule Take 50,000 Units by mouth every 14 days. Indications: Vitamin D Deficiency   Yes Historical Provider, MD   aspirin 325 MG EC tablet Take 325 mg by mouth daily. Indications: Prevention of CVD    Historical Provider, MD   calcium-vitamin D (OYSTER CALCIUM 500 + D) 500-200 MG-UNIT per tablet Take 1 tablet by mouth 2 times daily. Administer with food.   Indications: supplement    Historical Provider, MD       Allergies:  Patient has no known allergies. Social History:   TOBACCO:   reports that she has never smoked. She has never used smokeless tobacco.  ETOH:   reports that she does not drink alcohol. OCCUPATION:  none    Family History:   No family history on file. REVIEW OF SYSTEMS:  ROS not able to be obtained    Physical Exam:    Vitals: There were no vitals taken for this visit. Constitutional:arousable, but nonverbal, does grunt on exam  Skin: Skin color, texture, turgor normal. No rashes or lesions  Eyes:Eye: Normal external eye, conjunctiva, ANDREW. ENT: Head: Normocephalic, no lesions, without obvious abnormality. Neck: no adenopathy, no carotid bruit, no JVD, supple, symmetrical, trachea midline and thyroid not enlarged, symmetric, no tenderness/mass/nodules  Respiratory: clear to auscultation bilaterally  Cardiovascular: regular rate and rhythm, S1, S2 normal, no murmur, click, rub or gallop  Gastrointestinal: soft, non-tender; bowel sounds normal; no masses,  no organomegaly  Genitourinary: Deferred  Musculoskeletal:extremities normal, atraumatic, no cyanosis or edema  Neurologic: unable to fully assess cognition status, duet o nonverbal and minimally interactive  Psychiatric: cannot assess  Hematologic: No obvious bruising or bleeding    Recent Labs     03/21/19 1921   WBC 6.5   HGB 13.3        Recent Labs     03/21/19 1921   *   K 3.9   CL 91*   CO2 22   BUN 14   CREATININE 0.85   GLUCOSE 218*   AST 24   ALT 13   BILITOT 0.3   ALKPHOS 72     Troponin T:   Recent Labs     03/21/19 1921   TROPONINI <0.010       ABGs: No results found for: PHART, PO2ART, QUH1PZK  INR: No results for input(s): INR in the last 72 hours.   URINALYSIS:  Recent Labs     03/22/19  0007 03/22/19  0015   COLORU Yellow  --    PHUR 5.5  --    WBCUA  --  0-2   RBCUA  --  0-2   BACTERIA  --  None   CLARITYU Clear  --    SPECGRAV 1.010  --    LEUKOCYTESUR Negative  --    UROBILINOGEN 0.2  -- BILIRUBINUR Negative  --    BLOODU Trace-lysed  --    GLUCOSEU Negative  --      -----------------------------------------------------------------   No results found. EKG: ordered    Assessment and Plan   1. SBO with transition point: pt will likely require surgery. Consult general surgery. Npo. NG to LIS. /hour  2. Seizures: valproate BID. 3. DVT PPX lovenox.    4. MR with behavioral disturbance: restart home regimen when stable for po    Patient Active Problem List   Diagnosis Code    MR (mental retardation) F79    Seizure disorder ([facility] G40.909    Legally blind H54.8    Chronic rhinitis J31.0    Mood disorder ([facility] [address] F39    Vitamin D deficiency E55.9    Osteoporosis M81.0    Full code status Z78.9    Dietary restriction Z71.3    Dyslipidemia E78.5    Periodontal disease K05.6    Mental retardation F79    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, T50.905A    Leucopenia D72.819    Pneumonia J18.9    Aphakia, left eye H27.02    Hyphema, 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

## 2019-03-22 NOTE — CARE COORDINATION
Attempted phone call to guardian to review IMM. Also called [facility] who referrer to the nurse Franchesca Ayers who was not available at this time. Spoke to [facility] who confirmed that pt would return to [facility]. Reviewed IMM.

## 2019-03-22 NOTE — PROGRESS NOTES
Patient had a colonoscopy which showed no evidence of obstruction at her rectosigmoid anastomosis. This was widely patent. The remainder of the colon was unremarkable as well. There was no evidence of obstruction of the large bowel. Given her preoperative abdominal exam and minimal nasogastric tube output, will DC NG tube and challenge with liquids. No surgical intervention required.

## 2019-03-23 LAB
ALBUMIN SERPL-MCNC: 3.7 G/DL (ref 3.5–4.6)
ANION GAP SERPL CALCULATED.3IONS-SCNC: 13 MEQ/L (ref 9–15)
BASOPHILS ABSOLUTE: 0 K/UL (ref 0–0.2)
BASOPHILS RELATIVE PERCENT: 0.5 %
BUN BLDV-MCNC: 7 MG/DL (ref 8–23)
CALCIUM SERPL-MCNC: 8.5 MG/DL (ref 8.5–9.9)
CHLORIDE BLD-SCNC: 102 MEQ/L (ref 95–107)
CO2: 29 MEQ/L (ref 20–31)
CREAT SERPL-MCNC: 0.34 MG/DL (ref 0.5–0.9)
EOSINOPHILS ABSOLUTE: 0 K/UL (ref 0–0.7)
EOSINOPHILS RELATIVE PERCENT: 0.2 %
GFR AFRICAN AMERICAN: >60
GFR NON-AFRICAN AMERICAN: >60
GLUCOSE BLD-MCNC: 104 MG/DL (ref 70–99)
HCT VFR BLD CALC: 34.8 % (ref 37–47)
HEMOGLOBIN: 12 G/DL (ref 12–16)
LYMPHOCYTES ABSOLUTE: 0.9 K/UL (ref 1–4.8)
LYMPHOCYTES RELATIVE PERCENT: 9.9 %
MAGNESIUM: 1.8 MG/DL (ref 1.7–2.4)
MCH RBC QN AUTO: 33.3 PG (ref 27–31.3)
MCHC RBC AUTO-ENTMCNC: 34.6 % (ref 33–37)
MCV RBC AUTO: 96.3 FL (ref 82–100)
MONOCYTES ABSOLUTE: 1.4 K/UL (ref 0.2–0.8)
MONOCYTES RELATIVE PERCENT: 15.2 %
NEUTROPHILS ABSOLUTE: 6.7 K/UL (ref 1.4–6.5)
NEUTROPHILS RELATIVE PERCENT: 74.2 %
PDW BLD-RTO: 13.6 % (ref 11.5–14.5)
PHOSPHORUS: 3 MG/DL (ref 2.3–4.8)
PLATELET # BLD: 127 K/UL (ref 130–400)
POTASSIUM SERPL-SCNC: 4.1 MEQ/L (ref 3.4–4.9)
RBC # BLD: 3.62 M/UL (ref 4.2–5.4)
SODIUM BLD-SCNC: 144 MEQ/L (ref 135–144)
WBC # BLD: 9.1 K/UL (ref 4.8–10.8)

## 2019-03-23 PROCEDURE — 85025 COMPLETE CBC W/AUTO DIFF WBC: CPT

## 2019-03-23 PROCEDURE — 2580000003 HC RX 258: Performed by: COLON & RECTAL SURGERY

## 2019-03-23 PROCEDURE — 36415 COLL VENOUS BLD VENIPUNCTURE: CPT

## 2019-03-23 PROCEDURE — 6360000002 HC RX W HCPCS: Performed by: COLON & RECTAL SURGERY

## 2019-03-23 PROCEDURE — 51702 INSERT TEMP BLADDER CATH: CPT

## 2019-03-23 PROCEDURE — 83735 ASSAY OF MAGNESIUM: CPT

## 2019-03-23 PROCEDURE — 80069 RENAL FUNCTION PANEL: CPT

## 2019-03-23 PROCEDURE — 1210000000 HC MED SURG R&B

## 2019-03-23 PROCEDURE — 2500000003 HC RX 250 WO HCPCS: Performed by: COLON & RECTAL SURGERY

## 2019-03-23 PROCEDURE — 51798 US URINE CAPACITY MEASURE: CPT

## 2019-03-23 RX ADMIN — Medication 10 ML: at 09:59

## 2019-03-23 RX ADMIN — SODIUM CHLORIDE, POTASSIUM CHLORIDE, SODIUM LACTATE AND CALCIUM CHLORIDE: 600; 310; 30; 20 INJECTION, SOLUTION INTRAVENOUS at 11:21

## 2019-03-23 RX ADMIN — SODIUM CHLORIDE, POTASSIUM CHLORIDE, SODIUM LACTATE AND CALCIUM CHLORIDE: 600; 310; 30; 20 INJECTION, SOLUTION INTRAVENOUS at 18:58

## 2019-03-23 RX ADMIN — ENOXAPARIN SODIUM 40 MG: 40 INJECTION SUBCUTANEOUS at 10:04

## 2019-03-23 RX ADMIN — SODIUM CHLORIDE, POTASSIUM CHLORIDE, SODIUM LACTATE AND CALCIUM CHLORIDE: 600; 310; 30; 20 INJECTION, SOLUTION INTRAVENOUS at 03:24

## 2019-03-23 RX ADMIN — VALPROATE SODIUM 250 MG: 100 INJECTION, SOLUTION INTRAVENOUS at 09:58

## 2019-03-23 RX ADMIN — VALPROATE SODIUM 250 MG: 100 INJECTION, SOLUTION INTRAVENOUS at 20:38

## 2019-03-23 ASSESSMENT — PAIN SCALES - WONG BAKER
WONGBAKER_NUMERICALRESPONSE: 4
WONGBAKER_NUMERICALRESPONSE: 2
WONGBAKER_NUMERICALRESPONSE: 4
WONGBAKER_NUMERICALRESPONSE: 2
WONGBAKER_NUMERICALRESPONSE: 2

## 2019-03-23 NOTE — PROGRESS NOTES
0(0)   0(0)   Shift Total(mL/kg) 450(6.1) 1400(19)  1850(25.1)   Weight (kg) 73.6 73.6 73.6 73.6       LABS  Recent Labs     03/21/19  1921 03/22/19  0657 03/23/19  0641   WBC 6.5 7.7 9.1   HGB 13.3 11.9* 12.0   HCT 38.7 34.9* 34.8*    104* 127*   * 138 144   K 3.9 4.7 4.1   CL 91* 102 102   CO2 22 22 29   BUN 14 9 7*   CREATININE 0.85 0.49* 0.34*   MG 1.6*  --  1.8   PHOS  --   --  3.0   CALCIUM 9.2 8.7 8.5      Recent Labs     03/22/19  0657   INR 1.1     Recent Labs     03/21/19 1921   AST 24   ALT 13   BILITOT 0.3   LACTA 6.6*       RADIOLOGY  Ct Abdomen Pelvis W Iv Contrast Additional Contrast? None    Result Date: 3/22/2019   Technique: CT ABDOMEN PELVIS W IV CONTRAST Helically Acquired CT of the  abdomen and pelvis was performed during the intravenous infusion of 100 mL of Isovue-370. Axial delayed images were also performed. Reformatted sagittal and coronal images were also  obtained. Findings: The visualized bases of the lungs contain increased interstitial markings without pleural effusion or consolidation. The heart is mildly prominent. There are coronary calcifications and/or stents. Fluid is seen in the esophagus. Distended air-filled loops of bowel are seen anteriorly. Small bowel loops are normal caliber in the left side of the abdomen and also fluid-filled loops of small bowel that are not dilated are seen in the pelvis. There are surgical sutures seen in the right side of the abdomen and also in the pelvis. The sigmoid colon is not dilated. No free fluid is seen. There is no bowel wall thickening. The liver, and spleen are unremarkable. The gallbladder is collapsed. Small hypodensities are seen in the left kidney. Both kidneys show uptake of the contrast with excretion into the urinary bladder. Scattered vascular calcifications are seen with no evidence for aneurysm. Multilevel degenerative changes are seen in the spine.      Impression: Dilated loops of large bowel are seen and the

## 2019-03-23 NOTE — PROGRESS NOTES
Hospitalist Progress Note      PCP: Felipe Sahni MD    Date of Admission: 3/22/2019    Chief Complaint:  Patient underwent colonoscopy yesterday that did not show any obstruction,afebrile,stable HD, NG was removed, tolerating liquid diet, jeff catheter had to be placed again due to urinary retention    Medications:  Reviewed    Infusion Medications    lactated ringers 125 mL/hr at 03/23/19 1121     Scheduled Medications    sodium chloride flush  10 mL Intravenous 2 times per day    enoxaparin  40 mg Subcutaneous Daily    valproate sodium (DEPACON) IVPB  250 mg Intravenous Q12H     PRN Meds: sodium chloride flush, ondansetron, bisacodyl, LORazepam      Intake/Output Summary (Last 24 hours) at 3/23/2019 1328  Last data filed at 3/23/2019 1257  Gross per 24 hour   Intake 4220 ml   Output 1850 ml   Net 2370 ml       Exam:    BP (!) 171/137   Pulse 87   Temp 98.2 °F (36.8 °C) (Oral)   Resp 22   Wt 162 lb 3.2 oz (73.6 kg)   SpO2 99%   BMI 31.68 kg/m²     General appearance: awake, NG in place, nonverbal  Respiratory:  Clear to auscultation, bilaterally without Rales/Wheezes/Rhonchi. Cardiovascular: Regular rate and rhythm with normal S1/S2 . Abdomen: Soft, active bowel sounds. Musculoskeletal: No edema bilaterally.       Labs:   Recent Labs     03/21/19 1921 03/22/19  0657 03/23/19  0641   WBC 6.5 7.7 9.1   HGB 13.3 11.9* 12.0   HCT 38.7 34.9* 34.8*    104* 127*     Recent Labs     03/21/19 1921 03/22/19  0657 03/23/19  0641   * 138 144   K 3.9 4.7 4.1   CL 91* 102 102   CO2 22 22 29   BUN 14 9 7*   CREATININE 0.85 0.49* 0.34*   CALCIUM 9.2 8.7 8.5   PHOS  --   --  3.0     Recent Labs     03/21/19 1921   AST 24   ALT 13   BILITOT 0.3   ALKPHOS 72     Recent Labs     03/22/19  0657   INR 1.1     Recent Labs     03/21/19 1921   TROPONINI <0.010       Urinalysis:      Lab Results   Component Value Date    NITRU Negative 03/22/2019    WBCUA 0-2 03/22/2019    BACTERIA None 03/22/2019    RBCUA 0-2 03/22/2019    BLOODU Trace-lysed 03/22/2019    SPECGRAV 1.010 03/22/2019    GLUCOSEU Negative 03/22/2019    GLUCOSEU NEG 03/02/2012       Radiology:  XR Chest Abdomen Ng Placement   Final Result   NG TUBE TIP IS IN THE STOMACH. LEFT LOWER LUNG INFILTRATE.          FL LESS THAN 1 HOUR    (Results Pending)           Assessment/Plan:    78 y.o. female group home resident with a history of severe MRDD, nonverbal, mood disorder, seizure disorder who presented with :    SBO  - underwent colonoscopy yesterday that did not show any obstruction,  - NG was removed, tolerating liquid diet  - surgical service following    Hyponatremia   - resolved    Hyperglycemia   - improved    Urinary retention  - jeff placed with return of 1400 ml per nursing staff  - consulted urology        Electronically signed by Jennifer Robins MD on 3/23/2019 at 1:28 PM

## 2019-03-23 NOTE — OP NOTE
[address]                      [address], [address] [facility]                                OPERATIVE REPORT    PATIENT NAME: Jomar Davis                 :        1940  MED REC NO:   [id number]                            ROOM:       R177  ACCOUNT NO:   [de-identified]                           ADMIT DATE: 2019  PROVIDER:     Gina Jones MD    DATE OF PROCEDURE:  2019    PREOPERATIVE DIAGNOSIS:  Large bowel obstruction with anastomotic  stricture. POSTOPERATIVE DIAGNOSIS:  Patent anastomotic stricture. SURGEON:  Gina Jones MD    OPERATION PERFORMED:  Colonoscopy to ascending colon. COMPLICATIONS:  None. ESTIMATED BLOOD LOSS:  None. SEDATION:  MAC. SPECIMENS:  None. INDICATION:  This is a 66-year-old female who we admitted to the  hospital with obstruction which appeared to be large bowel in nature at  the region of the rectosigmoid anastomosis from a previous abdominal  operation. I discussed the risks with her power  regarding sigmoidoscopy  and possible colonic stent placement. They understood the risks and  wished to proceed. Consent obtained. OPERATIVE PROCEDURE:  She was taken to the endoscopy suite and placed in  the left lateral position. Sedation was given as described above. Continuous hemodynamic monitoring was performed. A time-out was taken  for appropriate verification. The colonoscope was placed in the anus and advanced to the ascending  colon. The cecum was not identified due to stool present. The scope  was pulled back through the transverse colon which was of normal  caliber. No mucosal abnormalities were identified. The scope was  pulled to the left colon to the level of the rectosigmoid junction where  a widely patent anastomosis was present without evidence of stricture or  obstruction. The rectum was normal as well. The rectum was  desufflated.   There was no evidence of strictured anastomosis. The  procedure ended at this time.         Mk Medel MD    D: 03/22/2019 16:20:35       T: 03/22/2019 16:22:20     [email or web address]  Job#: [id number]     Doc#: [id number]    CC:

## 2019-03-23 NOTE — PROGRESS NOTES
Pt was very uncooperative when getting assessed. Pt kept grabbing at me. I've offered pt drink of water each time I entered the room. Pt has refused. Will continue to offer pt fluids. Pt has been quiet and resting well.

## 2019-03-24 VITALS
WEIGHT: 162.2 LBS | SYSTOLIC BLOOD PRESSURE: 146 MMHG | TEMPERATURE: 99.7 F | DIASTOLIC BLOOD PRESSURE: 103 MMHG | RESPIRATION RATE: 22 BRPM | HEART RATE: 89 BPM | BODY MASS INDEX: 31.68 KG/M2 | OXYGEN SATURATION: 98 %

## 2019-03-24 LAB
ALBUMIN SERPL-MCNC: 3.5 G/DL (ref 3.5–4.6)
ANION GAP SERPL CALCULATED.3IONS-SCNC: 12 MEQ/L (ref 9–15)
BASOPHILS ABSOLUTE: 0 K/UL (ref 0–0.2)
BASOPHILS RELATIVE PERCENT: 0.4 %
BUN BLDV-MCNC: 3 MG/DL (ref 8–23)
CALCIUM SERPL-MCNC: 8.7 MG/DL (ref 8.5–9.9)
CHLORIDE BLD-SCNC: 101 MEQ/L (ref 95–107)
CO2: 28 MEQ/L (ref 20–31)
CREAT SERPL-MCNC: 0.36 MG/DL (ref 0.5–0.9)
EOSINOPHILS ABSOLUTE: 0 K/UL (ref 0–0.7)
EOSINOPHILS RELATIVE PERCENT: 0.3 %
GFR AFRICAN AMERICAN: >60
GFR NON-AFRICAN AMERICAN: >60
GLUCOSE BLD-MCNC: 106 MG/DL (ref 70–99)
HCT VFR BLD CALC: 35.7 % (ref 37–47)
HEMOGLOBIN: 12.4 G/DL (ref 12–16)
LYMPHOCYTES ABSOLUTE: 1.3 K/UL (ref 1–4.8)
LYMPHOCYTES RELATIVE PERCENT: 17.6 %
MAGNESIUM: 1.8 MG/DL (ref 1.7–2.4)
MCH RBC QN AUTO: 33.5 PG (ref 27–31.3)
MCHC RBC AUTO-ENTMCNC: 34.6 % (ref 33–37)
MCV RBC AUTO: 96.6 FL (ref 82–100)
MONOCYTES ABSOLUTE: 1.2 K/UL (ref 0.2–0.8)
MONOCYTES RELATIVE PERCENT: 15.6 %
NEUTROPHILS ABSOLUTE: 4.9 K/UL (ref 1.4–6.5)
NEUTROPHILS RELATIVE PERCENT: 66.1 %
PDW BLD-RTO: 13.3 % (ref 11.5–14.5)
PHOSPHORUS: 3.4 MG/DL (ref 2.3–4.8)
PLATELET # BLD: 146 K/UL (ref 130–400)
POTASSIUM SERPL-SCNC: 4 MEQ/L (ref 3.4–4.9)
RBC # BLD: 3.7 M/UL (ref 4.2–5.4)
SODIUM BLD-SCNC: 141 MEQ/L (ref 135–144)
URINE CULTURE, ROUTINE: NORMAL
WBC # BLD: 7.5 K/UL (ref 4.8–10.8)

## 2019-03-24 PROCEDURE — 36415 COLL VENOUS BLD VENIPUNCTURE: CPT

## 2019-03-24 PROCEDURE — 6370000000 HC RX 637 (ALT 250 FOR IP): Performed by: INTERNAL MEDICINE

## 2019-03-24 PROCEDURE — 2700000000 HC OXYGEN THERAPY PER DAY

## 2019-03-24 PROCEDURE — 80069 RENAL FUNCTION PANEL: CPT

## 2019-03-24 PROCEDURE — 99231 SBSQ HOSP IP/OBS SF/LOW 25: CPT | Performed by: SURGERY

## 2019-03-24 PROCEDURE — 83735 ASSAY OF MAGNESIUM: CPT

## 2019-03-24 PROCEDURE — 85025 COMPLETE CBC W/AUTO DIFF WBC: CPT

## 2019-03-24 PROCEDURE — 2580000003 HC RX 258: Performed by: COLON & RECTAL SURGERY

## 2019-03-24 PROCEDURE — 2500000003 HC RX 250 WO HCPCS: Performed by: COLON & RECTAL SURGERY

## 2019-03-24 PROCEDURE — 6360000002 HC RX W HCPCS: Performed by: COLON & RECTAL SURGERY

## 2019-03-24 RX ORDER — ACETAMINOPHEN 325 MG/1
650 TABLET ORAL EVERY 4 HOURS PRN
Status: DISCONTINUED | OUTPATIENT
Start: 2019-03-24 | End: 2019-03-24 | Stop reason: HOSPADM

## 2019-03-24 RX ADMIN — VALPROATE SODIUM 250 MG: 100 INJECTION, SOLUTION INTRAVENOUS at 10:32

## 2019-03-24 RX ADMIN — ACETAMINOPHEN 650 MG: 325 TABLET ORAL at 09:47

## 2019-03-24 RX ADMIN — SODIUM CHLORIDE, POTASSIUM CHLORIDE, SODIUM LACTATE AND CALCIUM CHLORIDE: 600; 310; 30; 20 INJECTION, SOLUTION INTRAVENOUS at 01:32

## 2019-03-24 RX ADMIN — SODIUM CHLORIDE, POTASSIUM CHLORIDE, SODIUM LACTATE AND CALCIUM CHLORIDE: 600; 310; 30; 20 INJECTION, SOLUTION INTRAVENOUS at 09:56

## 2019-03-24 RX ADMIN — ENOXAPARIN SODIUM 40 MG: 40 INJECTION SUBCUTANEOUS at 09:01

## 2019-03-24 ASSESSMENT — PAIN DESCRIPTION - PAIN TYPE: TYPE: ACUTE PAIN

## 2019-03-24 ASSESSMENT — PAIN SCALES - WONG BAKER
WONGBAKER_NUMERICALRESPONSE: 4
WONGBAKER_NUMERICALRESPONSE: 4
WONGBAKER_NUMERICALRESPONSE: 6

## 2019-03-24 ASSESSMENT — PAIN SCALES - GENERAL
PAINLEVEL_OUTOF10: 6
PAINLEVEL_OUTOF10: 4
PAINLEVEL_OUTOF10: 6

## 2019-03-24 ASSESSMENT — PAIN DESCRIPTION - LOCATION: LOCATION: HEAD

## 2019-03-24 ASSESSMENT — PAIN DESCRIPTION - DESCRIPTORS: DESCRIPTORS: HEADACHE

## 2019-03-24 NOTE — PROGRESS NOTES
Called report to [address] [place] [address]. [facility] here to transport.   Electronically signed by Tarik Ybarra RN on 3/24/2019 at 4:54 PM

## 2019-03-24 NOTE — PROGRESS NOTES
Assessment completed this shift. Pt alert. Non verbal, but makes occasional noises. Patient hitting at face and head. Tylenol given for headache. Pt now looks comfortable in bed. Incontinent of urine. Pericare provided.   Electronically signed by Jaydon Gabriel RN on 3/24/2019 at 11:40 AM

## 2019-03-24 NOTE — DISCHARGE SUMMARY
Physician Discharge Summary     Patient ID:  Laura Gentile  [id number]  78 y.o.  1940    Admit date: 3/22/2019    Discharge date and time: 3/24/19    Admitting Physician: Shilpa Peralta DO     Discharge Physician: [facility]    Admission Diagnoses: SBO (small bowel obstruction) ([facility] [K56.609]    Discharge Diagnoses: SBO  SBO       Hyponatremia        Hyperglycemia        Urinary retention resolved            Past Medical History:   Diagnosis Date    Ceruminosis     Chronic rhinitis     Dyslipidemia     History of encephalitis     Legally blind     Mood disorder ([facility]     MR (mental retardation)     Osteoporosis     Periodontal disease     Seizure disorder ([facility]     Vitamin D deficiency          Admission Condition: fair    Discharged Condition: stable  Indication for Admission: The patient is a 78 y.o. female with a history of MR, Mood disorder, DLD, seizure d/o. Admitted from [facility] with [facility] Course: NT tube, IVF,  cT abd shows:   Impression:   Dilated loops of large bowel are seen and the transition point appears to be in the sigmoid colon where there is evidence of anastomosis. This could be postsurgical changes. No bowel wall thickening is seen but obstruction is not excluded. There is also    some fluid seen in the visualized distal esophagus on the initial images. This raises some concern for aspiration.          Consults: General surgery  Significant Diagnostic Studies:   Lab Results   Component Value Date    WBC 7.5 03/24/2019    HGB 12.4 03/24/2019    HCT 35.7 (L) 03/24/2019    MCV 96.6 03/24/2019     03/24/2019     Lab Results   Component Value Date     03/24/2019    K 4.0 03/24/2019    K 4.1 12/10/2018     03/24/2019    CO2 28 03/24/2019    BUN 3 03/24/2019    CREATININE 0.36 03/24/2019    GLUCOSE 106 03/24/2019    GLUCOSE 85 03/13/2012    CALCIUM 8.7 03/24/2019          Treatments:   Current Facility-Administered Medications   Medication Dose Route Frequency Provider Last Rate Last Dose    acetaminophen (TYLENOL) tablet 650 mg  650 mg Oral Q4H PRN Mindy Grossman MD   650 mg at 03/24/19 0947    sodium chloride flush 0.9 % injection 10 mL  10 mL Intravenous 2 times per day Osito Arceo MD   10 mL at 03/23/19 0959    sodium chloride flush 0.9 % injection 10 mL  10 mL Intravenous PRN Osito Arceo MD        ondansetron [facility]) injection 4 mg  4 mg Intravenous Q6H PRN Hannah Yepez DO        enoxaparin (LOVENOX) injection 40 mg  40 mg Subcutaneous Daily Osito Arceo MD   40 mg at 03/24/19 0901    bisacodyl (DULCOLAX) suppository 10 mg  10 mg Rectal Daily PRN Osito Arceo MD        valproate (DEPACON) 250 mg in dextrose 5 % 100 mL IVPB  250 mg Intravenous Q12H Osito Arceo  mL/hr at 03/24/19 1032 250 mg at 03/24/19 1032    LORazepam (ATIVAN) injection 1 mg  1 mg Intravenous Q6H PRN Osito Arceo MD           Discharge Exam:  HEENT: AT/NC, PERRLA, no JVD  HEART: s1/s2 wnl w/o s3  LUNG: clear  ABD: soft, NT  EXT: no edema  SKin : no rash  Neuro:no focal deficits      [facility]: [facility]  Patient Instructions: Activity: as tolerated  Diet: regular diet      Follow-up with PCP in 1 week  Overtime on dc summary was 45 min     Medication List      CHANGE how you take these medications    aspirin 81 MG tablet  What changed:  Another medication with the same name was removed. Continue taking this medication, and follow the directions you see here. CONTINUE taking these medications    acetaminophen 325 MG tablet  Commonly known as:  TYLENOL     albuterol 0.63 MG/3ML nebulizer solution  Commonly known as:  ACCUNEB     ANUSOL-HC 2.5 % rectal cream  Generic drug:  hydrocortisone     atropine 1 % ophthalmic ointment     busPIRone 15 MG tablet  Commonly known as:  BUSPAR     carbamide peroxide 6.5 % otic solution  Commonly known as:  DEBROX     Cetirizine HCl 10 MG Caps  Take 10 mg by mouth daily. chapstick [facility]     chlorhexidine 0.12 % solution  Commonly known as:  PERIDEX     clonazePAM 1 MG tablet  Commonly known as:  KLONOPIN  Take 1 tablet by mouth 3 times daily for 150 days. * DEPAKOTE ER PO     * divalproex 250 MG DR tablet  Commonly known as:  DEPAKOTE     Dextromethorphan HBr 10 MG/5ML Syrp     docusate sodium 100 MG capsule  Commonly known as:  COLACE     fexofenadine 180 MG tablet  Commonly known as:  [address] GM/118ML     fluticasone 50 MCG/ACT nasal spray  Commonly known as:  FLONASE     hydrocortisone 1 % cream     ibuprofen 200 MG tablet  Commonly known as:  ADVIL;MOTRIN     lactulose encephalopathy 10 GM/15ML Soln solution     MILK OF MAGNESIA PO     MULTIVITAMIN PO     mupirocin 2 % ointment  Commonly known as:  BACTROBAN     niacin 500 MG extended release tablet  Commonly known as:  NIASPAN     OXYGEN     OYSTER CALCIUM 500 + D 500-200 MG-UNIT per tablet  Generic drug:  calcium-vitamin D     PREP-HEM RE     PROBIOTIC PO     pseudoephedrine-codeine-guaifenesin 30- MG/5ML solution  Commonly known as:  MYTUSSIN DAC     QUEtiapine 200 MG tablet  Commonly known as:  SEROQUEL     REFRESH OP     REGULOID 58.6 % powder  Generic drug:  psyllium     sodium chloride 0.65 % nasal spray  Commonly known as:  OCEAN, BABY AYR     tetrahydrozoline 0.05 % ophthalmic solution     vitamin D 28613 units Caps capsule  Commonly known as:  ERGOCALCIFEROL         * This list has 2 medication(s) that are the same as other medications prescribed for you. Read the directions carefully, and ask your doctor or other care provider to review them with you.                 SignedMerline Reil  3/24/2019  11:28 AM

## 2019-03-24 NOTE — PROGRESS NOTES
Pt Name: Kassi Arellano  Medical Record Number: [id number]  Date of Birth 1940   Admit date 3/22/2019  1:57 AM  Today's Date: 3/24/2019     ASSESSMENT  1. Hospital day # 2  2  Constipation with no colonic obstruction    PLAN  1. General diet  2. Home       SUBJECTIVE  Chief complaint: non verbal  Afebrile, vital signs are stable. She denies any nausea or vomiting, has passed flatus and had several bowel movements. She is tolerating a DIET GENERAL;. Her pain is well controlled on current medications. .      has a past medical history of Ceruminosis, Chronic rhinitis, Dyslipidemia, History of encephalitis, Legally blind, Mood disorder ([facility] [address] MR (mental retardation), Osteoporosis, Periodontal disease, Seizure disorder ([facility] [address] and Vitamin D deficiency. CURRENT MEDS  Scheduled Meds:   sodium chloride flush  10 mL Intravenous 2 times per day    enoxaparin  40 mg Subcutaneous Daily    valproate sodium (DEPACON) IVPB  250 mg Intravenous Q12H     Continuous Infusions:  PRN Meds:.acetaminophen, sodium chloride flush, ondansetron, bisacodyl, LORazepam    OBJECTIVE  CURRENT VITALS:  weight is 162 lb 3.2 oz (73.6 kg). Her oral temperature is 99.7 °F (37.6 °C). Her blood pressure is 146/103 (abnormal) and her pulse is 89. Her respiration is 22 and oxygen saturation is 98%. GENERAL: alert, no distress, non verbal, severe MR  ABDOMEN: distended, firm, no tenderness, bowel sounds present in all 4 quadrants and no guarding or peritoneal signs        In: 2623 [P.O.:600; I.V.:2023]  Out: 0   Date 03/24/19 0000 - 03/24/19 2359(Discharged)   Shift 7373-0896 6211-0723 2827-5762 24 Hour Total   INTAKE   P.O.(mL/kg/hr) 120(0.2) 480(0.8)  600   I. V.(mL/kg) 0958(52.7) 673(9.1)  3839(74.5)   Shift Total(mL/kg) 2606(20) 8279(06.9)  7150(26.7)   OUTPUT   Emesis/NG output(mL/kg) 0(0)   0(0)   Other(mL/kg) 0(0)   0(0)   Stool(mL/kg) 0(0)   0(0)   Blood(mL/kg) 0(0)   0(0)   Shift Total(mL/kg) 0(0)   0(0)   Weight (kg) 73.6 73.6 73.6 73.6       LABS  Recent Labs     03/21/19 1921 03/22/19  0657 03/23/19  0641 03/24/19  0635   WBC 6.5 7.7 9.1 7.5   HGB 13.3 11.9* 12.0 12.4   HCT 38.7 34.9* 34.8* 35.7*    104* 127* 146   * 138 144 141   K 3.9 4.7 4.1 4.0   CL 91* 102 102 101   CO2 22 22 29 28   BUN 14 9 7* 3*   CREATININE 0.85 0.49* 0.34* 0.36*   MG 1.6*  --  1.8 1.8   PHOS  --   --  3.0 3.4   CALCIUM 9.2 8.7 8.5 8.7      Recent Labs     03/22/19  0657   INR 1.1     Recent Labs     03/21/19 1921   AST 24   ALT 13   BILITOT 0.3   LACTA 6.6*       RADIOLOGY  Ct Abdomen Pelvis W Iv Contrast Additional Contrast? None    Result Date: 3/22/2019   Technique: CT ABDOMEN PELVIS W IV CONTRAST Helically Acquired CT of the  abdomen and pelvis was performed during the intravenous infusion of 100 mL of Isovue-370. Axial delayed images were also performed. Reformatted sagittal and coronal images were also  obtained. Findings: The visualized bases of the lungs contain increased interstitial markings without pleural effusion or consolidation. The heart is mildly prominent. There are coronary calcifications and/or stents. Fluid is seen in the esophagus. Distended air-filled loops of bowel are seen anteriorly. Small bowel loops are normal caliber in the left side of the abdomen and also fluid-filled loops of small bowel that are not dilated are seen in the pelvis. There are surgical sutures seen in the right side of the abdomen and also in the pelvis. The sigmoid colon is not dilated. No free fluid is seen. There is no bowel wall thickening. The liver, and spleen are unremarkable. The gallbladder is collapsed. Small hypodensities are seen in the left kidney. Both kidneys show uptake of the contrast with excretion into the urinary bladder. Scattered vascular calcifications are seen with no evidence for aneurysm. Multilevel degenerative changes are seen in the spine.      Impression: Dilated loops of large bowel are seen and the transition point appears to be in the sigmoid colon where there is evidence of anastomosis. This could be postsurgical changes. No bowel wall thickening is seen but obstruction is not excluded. There is also some fluid seen in the visualized distal esophagus on the initial images. This raises some concern for aspiration. .  All CT scans at this facility use dose modulation, iterative reconstruction, and/or weight based dosing     Xr Chest Portable    Result Date: 3/22/2019  COMPARISON: N December 9, 2018; December 21, 2018 HISTORY: Shortness of breath TECHNIQUE: AP view FINDINGS:  The lungs contain opacity in the left base. It appears more prominent than on prior study. No pleural effusion or pneumothorax is seen. The cardiac silhouette is within normal limits. Degenerative changes are seen in the dorsal spine and visualized portion of the right shoulder. Left lower lobe infiltrate is not excluded and recommend correlation with clinical exam.     Xr Chest Abdomen Ng Placement    Result Date: 3/22/2019  EXAMINATION:  XR CHEST ABDOMEN NG PLACEMENT CLINICAL HISTORY:  NG PLACEMENT COMPARISONS:  Chest radiographs 12/21/2018 TECHNIQUE:  Single AP erect portable radiograph including the lower half of the chest and the abdomen between the diaphragms and iliac crests. FINDINGS:  The NG tube tip is in the stomach, approximately 7.5 cm beyond the gastroesophageal junction. Visualized bowel gas pattern is nonspecific. No subdiaphragmatic free air is identified. There is mild cardiomegaly. There are streaky parenchymal opacities in the left lung base consistent with infiltrate. NG TUBE TIP IS IN THE STOMACH. LEFT LOWER LUNG INFILTRATE.      Electronically signed by Amira Oliveira MD on 3/24/2019 at 5:31 PM

## 2019-03-27 ENCOUNTER — OFFICE VISIT (OUTPATIENT)
Dept: INTERNAL MEDICINE | Age: 79
End: 2019-03-27
Payer: MEDICARE

## 2019-03-27 ENCOUNTER — HOSPITAL ENCOUNTER (OUTPATIENT)
Dept: WOMENS IMAGING | Age: 79
Discharge: HOME | End: 2019-03-29
Payer: MEDICARE

## 2019-03-27 VITALS
DIASTOLIC BLOOD PRESSURE: 68 MMHG | HEART RATE: 56 BPM | HEIGHT: 62 IN | BODY MASS INDEX: 30.01 KG/M2 | SYSTOLIC BLOOD PRESSURE: 124 MMHG | WEIGHT: 163.1 LBS

## 2019-03-27 DIAGNOSIS — J18.9 PNEUMONIA OF LEFT LOWER LOBE DUE TO INFECTIOUS ORGANISM: ICD-10-CM

## 2019-03-27 DIAGNOSIS — M81.0 OSTEOPOROSIS, UNSPECIFIED OSTEOPOROSIS TYPE, UNSPECIFIED PATHOLOGICAL FRACTURE PRESENCE: ICD-10-CM

## 2019-03-27 DIAGNOSIS — Z09 HOSPITAL DISCHARGE FOLLOW-UP: Primary | ICD-10-CM

## 2019-03-27 LAB
BLOOD CULTURE, ROUTINE: NORMAL
CULTURE, BLOOD 2: NORMAL

## 2019-03-27 PROCEDURE — G8399 PT W/DXA RESULTS DOCUMENT: HCPCS | Performed by: FAMILY MEDICINE

## 2019-03-27 PROCEDURE — G8427 DOCREV CUR MEDS BY ELIG CLIN: HCPCS | Performed by: FAMILY MEDICINE

## 2019-03-27 PROCEDURE — 1111F DSCHRG MED/CURRENT MED MERGE: CPT | Performed by: FAMILY MEDICINE

## 2019-03-27 PROCEDURE — 1123F ACP DISCUSS/DSCN MKR DOCD: CPT | Performed by: FAMILY MEDICINE

## 2019-03-27 PROCEDURE — 99214 OFFICE O/P EST MOD 30 MIN: CPT | Performed by: FAMILY MEDICINE

## 2019-03-27 PROCEDURE — G8417 CALC BMI ABV UP PARAM F/U: HCPCS | Performed by: FAMILY MEDICINE

## 2019-03-27 PROCEDURE — G8484 FLU IMMUNIZE NO ADMIN: HCPCS | Performed by: FAMILY MEDICINE

## 2019-03-27 PROCEDURE — 77080 DXA BONE DENSITY AXIAL: CPT

## 2019-03-27 PROCEDURE — 1090F PRES/ABSN URINE INCON ASSESS: CPT | Performed by: FAMILY MEDICINE

## 2019-03-27 PROCEDURE — 4040F PNEUMOC VAC/ADMIN/RCVD: CPT | Performed by: FAMILY MEDICINE

## 2019-03-27 PROCEDURE — 1036F TOBACCO NON-USER: CPT | Performed by: FAMILY MEDICINE

## 2019-03-27 RX ORDER — AMOXICILLIN AND CLAVULANATE POTASSIUM 875; 125 MG/1; MG/1
1 TABLET, FILM COATED ORAL 2 TIMES DAILY
Qty: 20 TABLET | Refills: 0 | Status: SHIPPED | OUTPATIENT
Start: 2019-03-27 | End: 2019-04-06

## 2019-03-27 ASSESSMENT — ENCOUNTER SYMPTOMS
EYE ITCHING: 0
CHEST TIGHTNESS: 0
APNEA: 0
EYE DISCHARGE: 0
EYE PAIN: 0
CHOKING: 0

## 2019-03-27 ASSESSMENT — PATIENT HEALTH QUESTIONNAIRE - PHQ9: DEPRESSION UNABLE TO ASSESS: FUNCTIONAL CAPACITY MOTIVATION LIMITS ACCURACY

## 2019-04-01 ENCOUNTER — TELEPHONE (OUTPATIENT)
Dept: INTERNAL MEDICINE | Age: 79
End: 2019-04-01

## 2019-04-08 RX ORDER — IBUPROFEN 200 MG
TABLET ORAL 4 TIMES DAILY
Status: ON HOLD | COMMUNITY
End: 2022-10-19 | Stop reason: HOSPADM

## 2019-04-08 RX ORDER — BUSPIRONE HYDROCHLORIDE 30 MG/1
30 TABLET ORAL 2 TIMES DAILY
Status: ON HOLD | COMMUNITY
End: 2022-10-19 | Stop reason: SDUPTHER

## 2019-04-08 RX ORDER — QUETIAPINE FUMARATE 100 MG/1
50 TABLET, FILM COATED ORAL 3 TIMES DAILY
COMMUNITY

## 2019-04-11 ENCOUNTER — OFFICE VISIT (OUTPATIENT)
Dept: INTERNAL MEDICINE | Age: 79
End: 2019-04-11
Payer: MEDICARE

## 2019-04-11 VITALS
BODY MASS INDEX: 29.76 KG/M2 | OXYGEN SATURATION: 96 % | WEIGHT: 161.7 LBS | HEART RATE: 72 BPM | SYSTOLIC BLOOD PRESSURE: 110 MMHG | DIASTOLIC BLOOD PRESSURE: 70 MMHG | HEIGHT: 62 IN

## 2019-04-11 DIAGNOSIS — Z23 NEED FOR SHINGLES VACCINE: ICD-10-CM

## 2019-04-11 DIAGNOSIS — Z00.00 ROUTINE GENERAL MEDICAL EXAMINATION AT A HEALTH CARE FACILITY: Primary | ICD-10-CM

## 2019-04-11 DIAGNOSIS — G40.909 SEIZURE DISORDER (HCC): ICD-10-CM

## 2019-04-11 DIAGNOSIS — F39 MOOD DISORDER (HCC): ICD-10-CM

## 2019-04-11 PROBLEM — J18.9 PNEUMONIA: Status: RESOLVED | Noted: 2018-12-09 | Resolved: 2019-04-11

## 2019-04-11 PROBLEM — K56.609 SBO (SMALL BOWEL OBSTRUCTION) (HCC): Status: RESOLVED | Noted: 2019-03-22 | Resolved: 2019-04-11

## 2019-04-11 PROCEDURE — 4040F PNEUMOC VAC/ADMIN/RCVD: CPT | Performed by: FAMILY MEDICINE

## 2019-04-11 PROCEDURE — G0438 PPPS, INITIAL VISIT: HCPCS | Performed by: FAMILY MEDICINE

## 2019-04-11 ASSESSMENT — ANXIETY QUESTIONNAIRES: GAD7 TOTAL SCORE: 0

## 2019-04-11 ASSESSMENT — PATIENT HEALTH QUESTIONNAIRE - PHQ9
SUM OF ALL RESPONSES TO PHQ QUESTIONS 1-9: 0
SUM OF ALL RESPONSES TO PHQ QUESTIONS 1-9: 0

## 2019-04-11 ASSESSMENT — LIFESTYLE VARIABLES: HOW OFTEN DO YOU HAVE A DRINK CONTAINING ALCOHOL: 0

## 2019-04-11 NOTE — PROGRESS NOTES
Medicare Annual Wellness Visit  Name: Tj Duvall Date: 2019   MRN: [id number] Sex: Female   Age: 78 y.o. Ethnicity: Non-/Non    : 1940 Race: White      Therese Persaud is here for [employer] (Non-Verbal)    Screenings for behavioral, psychosocial and functional/safety risks, and cognitive dysfunction are all negative except as indicated below. These results, as well as other patient data from the [address] form, are documented in Flowsheets linked to this Encounter. No Known Allergies    Prior to Visit Medications    Medication Sig Taking? Authorizing Provider   zoster recombinant adjuvanted vaccine [facility]) 50 MCG/0.5ML SUSR injection Inject 0.5 mLs into the muscle See Admin Instructions 1 dose now and repeat in 2-6 months Yes Yousif Bazan MD   busPIRone (BUSPAR) 30 MG tablet Take 30 mg by mouth 2 times daily Yes Historical Provider, MD   QUEtiapine (SEROQUEL) 100 MG tablet Take 100 mg by mouth 3 times daily Yes Historical Provider, MD   neomycin-bacitracin-polymyxin (NEOSPORIN) 5-400-5000 ointment Apply topically 4 times daily Apply topically 4 times daily.  Yes Historical Provider, MD   zinc oxide 13 % CREA Apply topically 2 times daily as needed for Rash Yes Historical Provider, MD   Dextromethorphan HBr 10 MG/5ML SYRP Take 5 mLs by mouth every 4 hours as needed  Yes Historical Provider, MD   chlorhexidine (PERIDEX) 0.12 % solution Take 15 mLs by mouth 2 times daily Yes Historical Provider, MD   Sunscreens (Carnella Opal) [address] Apply topically as needed Yes Historical Provider, MD   PE-Shark Liver Oil-Cocoa Buttr (PREP-HEM RE) Place rectally Yes Historical Provider, MD   aspirin 81 MG tablet Take 81 mg by mouth daily Yes Historical Provider, MD   divalproex (DEPAKOTE) 250 MG DR tablet Take 250 mg by mouth nightly Give with 500 mg to equal 750 Yes Historical Provider, MD   lactulose encephalopathy 10 GM/15ML SOLN solution Take 20 g by mouth 3 times daily Yes Historical Provider, MD   psyllium (REGULOID) 58.6 % powder Take 1 packet by mouth 3 times daily Take by mouth 3 times daily. Yes Historical Provider, MD   albuterol (ACCUNEB) 0.63 MG/3ML nebulizer solution Take 1 ampule by nebulization every 6 hours as needed for Wheezing Yes Historical Provider, MD   sodium chloride (OCEAN, BABY AYR) 0.65 % nasal spray 1 spray by Nasal route as needed for Congestion Yes Historical Provider, MD   carbamide peroxide (DEBROX) 6.5 % otic solution 5 drops 2 times daily Yes Historical Provider, MD   ibuprofen (ADVIL;MOTRIN) 200 MG tablet Take 200 mg by mouth every 6 hours as needed for Pain Yes Historical Provider, MD   hydrocortisone 1 % cream Apply topically 2 times daily Apply topically 2 times daily. Yes Historical Provider, MD   Probiotic Product (PROBIOTIC PO) Take by mouth Yes Historical Provider, MD   Polyvinyl Alcohol-Povidone (REFRESH OP) Apply to eye Yes Historical Provider, MD   pseudoephedrine-codeine-guaifenesin (MYTUSSIN DAC) 30- MG/5ML solution Take 10 mLs by mouth 4 times daily as needed for Cough Yes Historical Provider, MD   Sodium Phosphates (FLEET) Place 1 enema rectally daily as needed. Indications: Constipation Yes Historical Provider, MD   mupirocin (BACTROBAN) 2 % ointment Apply INTRANASALLY BID. RUB NARES TOGETHER. Indications: History MRSA. Yes Historical Provider, MD   OXYGEN Inhale 2 L/min into the lungs as needed. ADMINISTER VIA N/C.  TITRATE PRN TO MAINTAIN PULSE OX ABOVE 92%. Indications: Hypoxia Yes Historical Provider, MD   Cetirizine HCl 10 MG CAPS Take 10 mg by mouth daily. Yes EDDIE Tellez   niacin (NIASPAN) 500 MG CR tablet Take 500 mg by mouth nightly. Indications: Dyslipidemia Yes Historical Provider, MD   acetaminophen (TYLENOL) 325 MG tablet Take 650 mg by mouth every 4 hours as needed. May give rectal if unable to tolerate PO administration.   Indications: Fever, Pain Yes Historical Provider, MD   Magnesium Hydroxide (MILK OF MAGNESIA PO) Take 30 mLs by mouth daily as needed. Indications: Constipation Yes Historical Provider, MD   hydrocortisone (ANUSOL-HC) 2.5 % rectal cream Place 2.5 g rectally 3 times daily as needed. Indications: Hemorrhoids Yes Historical Provider, MD   Divalproex Sodium (DEPAKOTE ER PO) Take 500 mg by mouth 2 times daily  Yes Historical Provider, MD   docusate sodium (COLACE) 100 MG capsule Take 100 mg by mouth 2 times daily. Hold for loose stools. Indications: Chronic Constipation Yes Historical Provider, MD   fluticasone (FLONASE) 50 MCG/ACT nasal spray 1 spray by Nasal route 2 times daily. Indications: Allergic Rhinitis Yes Historical Provider, MD   Multiple Vitamin (MULTIVITAMIN PO) Take 1 tablet by mouth daily. Indications: multivitamin Yes Historical Provider, MD   calcium-vitamin D (OYSTER CALCIUM 500 + D) 500-200 MG-UNIT per tablet Take 1 tablet by mouth 2 times daily. Administer with food. Indications: supplement Yes Historical Provider, MD   vitamin D (ERGOCALCIFEROL) 51662 UNITS CAPS capsule Take 50,000 Units by mouth every 14 days. Indications: Vitamin D Deficiency Yes Historical Provider, MD   clonazePAM (KLONOPIN) 1 MG tablet Take 1 tablet by mouth 3 times daily for 150 days. Mode Nichols MD       Past Medical History:   Diagnosis Date    Ceruminosis     Chronic rhinitis     Dyslipidemia     History of encephalitis     Large bowel obstruction ([address]     Legally blind     Mood disorder ([facility] [address]     MR (mental retardation)     Osteoporosis     Periodontal disease     Pneumonia 12/9/2018    SBO (small bowel obstruction) ([facility] [address] 3/22/2019    Seizure disorder ([facility] [address]     Vitamin D deficiency      Past Surgical History:   Procedure Laterality Date    COLONOSCOPY N/A 3/22/2019    COLONOSCOPY performed by Francisco Leyden, MD at [facility]     History reviewed. No pertinent family history.     CareTeam (Including outside providers/suppliers regularly involved in providing care):   Patient Care Team:  Caryle Simmer, MD as PCP - General (Family Medicine)    Wt Readings from Last 3 Encounters:   04/11/19 161 lb 11.2 oz (73.3 kg)   03/27/19 163 lb 1.6 oz (74 kg)   03/23/19 162 lb 3.2 oz (73.6 kg)     Vitals:    04/11/19 0710   BP: 110/70   Site: Right Upper Arm   Position: Sitting   Cuff Size: Medium Adult   Pulse: 72   SpO2: 96%   Weight: 161 lb 11.2 oz (73.3 kg)   Height: 5' 1.5\" (1.562 m)     Body mass index is 30.06 kg/m². Based upon direct observation of the patient, evaluation of cognition reveals global memory impairment noted. Constitutional:  Appears well-developed and well-nourished. No distress. HENT:   Head: Normocephalic and atraumatic. Right Ear: External ear normal.   Left Ear: External ear normal.   Nose: Nose normal.   Eyes: right eye closed shut, left eye look ok  Cardiovascular: Normal rate, regular rhythm and normal heart sounds. Pulmonary/Chest: Effort normal and breath sounds normal. No respiratory distress. no wheezes. no rales. no tenderness. Musculoskeletal: wheelchair bound  Neurological: alert. Patient's complete Health Risk Assessment and screening values have been reviewed and are found in Flowsheets. The following problems were reviewed today and where indicated follow up appointments were made and/or referrals ordered. Positive Risk Factor Screenings with Interventions:     Depression:  Depression Unable to Assess: Functional capacity motivation limits accuracy  PHQ-2 Score: 0  PHQ-9 Total Score: 0    General Health:  General  In general, how would you say your health is?: Fair  In the past 7 days, have you experienced any of the following? New or Increased Pain, New or Increased Fatigue, Loneliness, Social Isolation, Stress or Anger?: None of These(N/A)  Do you get the social and emotional support that you need?: Yes  Do you have a Living Will?: (!) No  General Health Risk Interventions:  · patient is a [facility] home pt, she has state appointed POA.     Health Habits/Nutrition:  Health Habits/Nutrition  Do you exercise for at least 20 minutes 2-3 times per week?: (!) No  Have you lost any weight without trying in the past 3 months?: No  Do you eat fewer than 2 meals per day?: No  Have you seen a dentist within the past year?: Yes  Body mass index is 30.06 kg/m². Health Habits/Nutrition Interventions:  · Inadequate physical activity:  MRDD patient, lives in grp home    Hearing/Vision:  Hearing/Vision  Do you or your family notice any trouble with your hearing?: (N/A)  Do you have difficulty driving, watching TV, or doing any of your daily activities because of your eyesight?: (!) Yes  Have you had an eye exam within the past year?: Yes  Hearing/Vision Interventions:  · as above. she does get yearly eye exams    ADL:  ADLs  In the past 7 days, did you need help from others to perform any of the following everyday activities? Eating, dressing, grooming, bathing, toileting, or walking/balance?: (!) Eating, Dressing, Grooming, Bathing, Toileting, Walking/Balance  In the past 7 days, did you need help from others to take care of any of the following?  Laundry, housekeeping, banking/finances, shopping, telephone use, food preparation, transportation, or taking medications?: [employer], Housekeeping, Banking/Finances, Shopping, Telephone Use, Food Preparation, Transportation, Taking Medications  ADL Interventions:  · see above    Personalized Preventive Plan   Current Health Maintenance Status  Immunization History   Administered Date(s) Administered    BCG 02/20/2008, 02/25/2009, 02/16/2010, 02/01/2011, 01/11/2012, 02/05/2013, 03/10/2015, 03/12/2016, 03/25/2017    Hepatitis A 01/01/1984    Hepatitis B Adol 2 Dose (Recombivax HB) 01/01/1984, 04/25/2017    Influenza Virus Vaccine 10/20/2010, 10/11/2012, 10/18/2013, 10/26/2014, 10/23/2015, 11/02/2016, 10/25/2018    Influenza, High Dose (Fluzone 65 yrs and older) 11/09/2017    Pneumococcal 13-valent Conjugate (Loel Spanner)

## 2019-04-11 NOTE — PATIENT INSTRUCTIONS
Personalized Preventive Plan for Salas Cartagena - 4/11/2019  Medicare offers a range of preventive health benefits. Some of the tests and screenings are paid in full while other may be subject to a deductible, co-insurance, and/or copay. Some of these benefits include a comprehensive review of your medical history including lifestyle, illnesses that may run in your family, and various assessments and screenings as appropriate. After reviewing your medical record and screening and assessments performed today your provider may have ordered immunizations, labs, imaging, and/or referrals for you. A list of these orders (if applicable) as well as your Preventive Care list are included within your After Visit Summary for your review. Other Preventive Recommendations:    · A preventive eye exam performed by an eye specialist is recommended every 1-2 years to screen for glaucoma; cataracts, macular degeneration, and other eye disorders. · A preventive dental visit is recommended every 6 months. · Try to get at least 150 minutes of exercise per week or 10,000 steps per day on a pedometer . · Order or download the FREE \"Exercise & Physical Activity: Your Everyday Guide\" from [employer]. Call 7-890.533.4808 or search [employer] online. · You need 0436-1210 mg of calcium and 3165-1289 IU of vitamin D per day. It is possible to meet your calcium requirement with diet alone, but a vitamin D supplement is usually necessary to meet this goal.  · When exposed to the sun, use a sunscreen that protects against both UVA and UVB radiation with an SPF of 30 or greater. Reapply every 2 to 3 hours or after sweating, drying off with a towel, or swimming. · Always wear a seat belt when traveling in a car. Always wear a helmet when riding a bicycle or motorcycle.

## 2019-04-26 LAB
BUN BLDV-MCNC: NORMAL MG/DL
CALCIUM SERPL-MCNC: NORMAL MG/DL
CHLORIDE BLD-SCNC: NORMAL MMOL/L
CO2: NORMAL MMOL/L
CREAT SERPL-MCNC: NORMAL MG/DL
GFR CALCULATED: NORMAL
GLUCOSE BLD-MCNC: NORMAL MG/DL
POTASSIUM SERPL-SCNC: NORMAL MMOL/L
SODIUM BLD-SCNC: NORMAL MMOL/L

## 2019-07-02 LAB
VITAMIN D 25-HYDROXY: 29
VITAMIN D2, 25 HYDROXY: NORMAL
VITAMIN D3,25 HYDROXY: NORMAL

## 2019-08-01 LAB
ALBUMIN SERPL-MCNC: 3.5 G/DL
ALP BLD-CCNC: 57 U/L
ALT SERPL-CCNC: 8 U/L
ANION GAP SERPL CALCULATED.3IONS-SCNC: 1.8 MMOL/L
AST SERPL-CCNC: 16 U/L
BASOPHILS ABSOLUTE: NORMAL /ΜL
BASOPHILS RELATIVE PERCENT: 0.8 %
BILIRUB SERPL-MCNC: 0.3 MG/DL (ref 0.1–1.4)
BUN BLDV-MCNC: 10 MG/DL
CALCIUM SERPL-MCNC: 9 MG/DL
CHLORIDE BLD-SCNC: 97 MMOL/L
CHOLESTEROL, TOTAL: 165 MG/DL
CHOLESTEROL/HDL RATIO: 1.8
CO2: 29 MMOL/L
CREAT SERPL-MCNC: 0.6 MG/DL
EOSINOPHILS ABSOLUTE: 0.1 /ΜL
EOSINOPHILS RELATIVE PERCENT: 1.1 %
GFR CALCULATED: NORMAL
GLUCOSE BLD-MCNC: 74 MG/DL
HCT VFR BLD CALC: 37.7 % (ref 36–46)
HDLC SERPL-MCNC: 50 MG/DL (ref 35–70)
HEMOGLOBIN: 12.9 G/DL (ref 12–16)
LDL CHOLESTEROL CALCULATED: 92 MG/DL (ref 0–160)
LYMPHOCYTES ABSOLUTE: 2.7 /ΜL
LYMPHOCYTES RELATIVE PERCENT: 55.3 %
MCH RBC QN AUTO: 33 PG
MCHC RBC AUTO-ENTMCNC: 34.3 G/DL
MCV RBC AUTO: 96.1 FL
MONOCYTES ABSOLUTE: 0.7 /ΜL
MONOCYTES RELATIVE PERCENT: 13.5 %
NEUTROPHILS ABSOLUTE: 1.5 /ΜL
NEUTROPHILS RELATIVE PERCENT: 29.3 %
PLATELET # BLD: 129 K/ΜL
PMV BLD AUTO: 10.1 FL
POTASSIUM SERPL-SCNC: 4.5 MMOL/L
RBC # BLD: 3.92 10^6/ΜL
SODIUM BLD-SCNC: 135 MMOL/L
TOTAL PROTEIN: 5.4
TRIGL SERPL-MCNC: 113 MG/DL
VLDLC SERPL CALC-MCNC: 23 MG/DL
WBC # BLD: 4.9 10^3/ML

## 2019-08-28 RX ORDER — SIMVASTATIN 20 MG
TABLET ORAL
COMMUNITY
Start: 2019-07-24 | End: 2020-05-06

## 2019-09-26 LAB
A/G RATIO: NORMAL
ALBUMIN SERPL-MCNC: 3.5 G/DL
ALP BLD-CCNC: 57 U/L
ALT SERPL-CCNC: 6 U/L
AST SERPL-CCNC: 16 U/L
BILIRUB SERPL-MCNC: 0.4 MG/DL (ref 0.1–1.4)
BILIRUBIN DIRECT: 0.1 MG/DL
BILIRUBIN, INDIRECT: NORMAL
CHOLESTEROL, TOTAL: 164 MG/DL
CHOLESTEROL/HDL RATIO: 1.8
GLOBULIN: NORMAL
HDLC SERPL-MCNC: 52 MG/DL (ref 35–70)
LDL CHOLESTEROL CALCULATED: 92 MG/DL (ref 0–160)
PROTEIN TOTAL: 5.7 G/DL
TRIGL SERPL-MCNC: 100 MG/DL
VLDLC SERPL CALC-MCNC: 20 MG/DL

## 2019-10-09 ENCOUNTER — OFFICE VISIT (OUTPATIENT)
Dept: INTERNAL MEDICINE | Age: 79
End: 2019-10-09
Payer: MEDICARE

## 2019-10-09 VITALS
HEART RATE: 98 BPM | SYSTOLIC BLOOD PRESSURE: 128 MMHG | HEIGHT: 62 IN | BODY MASS INDEX: 31.54 KG/M2 | WEIGHT: 171.4 LBS | DIASTOLIC BLOOD PRESSURE: 80 MMHG | OXYGEN SATURATION: 99 %

## 2019-10-09 DIAGNOSIS — E78.5 DYSLIPIDEMIA: Primary | ICD-10-CM

## 2019-10-09 DIAGNOSIS — D69.59 DRUG-INDUCED THROMBOCYTOPENIA: ICD-10-CM

## 2019-10-09 DIAGNOSIS — G40.909 SEIZURE DISORDER (HCC): ICD-10-CM

## 2019-10-09 DIAGNOSIS — F39 MOOD DISORDER (HCC): ICD-10-CM

## 2019-10-09 DIAGNOSIS — T50.905A DRUG-INDUCED THROMBOCYTOPENIA: ICD-10-CM

## 2019-10-09 DIAGNOSIS — F79 INTELLECTUAL DISABILITY: ICD-10-CM

## 2019-10-09 PROCEDURE — 4040F PNEUMOC VAC/ADMIN/RCVD: CPT | Performed by: FAMILY MEDICINE

## 2019-10-09 PROCEDURE — G8427 DOCREV CUR MEDS BY ELIG CLIN: HCPCS | Performed by: FAMILY MEDICINE

## 2019-10-09 PROCEDURE — 1090F PRES/ABSN URINE INCON ASSESS: CPT | Performed by: FAMILY MEDICINE

## 2019-10-09 PROCEDURE — G8399 PT W/DXA RESULTS DOCUMENT: HCPCS | Performed by: FAMILY MEDICINE

## 2019-10-09 PROCEDURE — G8417 CALC BMI ABV UP PARAM F/U: HCPCS | Performed by: FAMILY MEDICINE

## 2019-10-09 PROCEDURE — 1036F TOBACCO NON-USER: CPT | Performed by: FAMILY MEDICINE

## 2019-10-09 PROCEDURE — 99213 OFFICE O/P EST LOW 20 MIN: CPT | Performed by: FAMILY MEDICINE

## 2019-10-09 PROCEDURE — 1123F ACP DISCUSS/DSCN MKR DOCD: CPT | Performed by: FAMILY MEDICINE

## 2019-10-09 PROCEDURE — G8484 FLU IMMUNIZE NO ADMIN: HCPCS | Performed by: FAMILY MEDICINE

## 2019-10-09 ASSESSMENT — ENCOUNTER SYMPTOMS
EYE ITCHING: 0
EYE PAIN: 0
CHEST TIGHTNESS: 0
APNEA: 0
EYE DISCHARGE: 0
CHOKING: 0

## 2019-12-16 DIAGNOSIS — F40.9 PHOBIA, UNSPECIFIED TYPE: Primary | ICD-10-CM

## 2019-12-16 RX ORDER — LORAZEPAM 1 MG/1
TABLET ORAL
Qty: 2 TABLET | Refills: 0 | Status: SHIPPED | OUTPATIENT
Start: 2019-12-16 | End: 2020-01-15

## 2020-01-21 LAB
ALBUMIN SERPL-MCNC: 3.7 G/DL
ALP BLD-CCNC: 63 U/L
ALT SERPL-CCNC: 7 U/L
ANION GAP SERPL CALCULATED.3IONS-SCNC: ABNORMAL MMOL/L
AST SERPL-CCNC: 17 U/L
BASOPHILS ABSOLUTE: 0.1 /ΜL
BASOPHILS RELATIVE PERCENT: 0.8 %
BILIRUB SERPL-MCNC: 0.4 MG/DL (ref 0.1–1.4)
BUN BLDV-MCNC: 13 MG/DL
CALCIUM SERPL-MCNC: 9 MG/DL
CHLORIDE BLD-SCNC: 100 MMOL/L
CHOLESTEROL, TOTAL: 158 MG/DL
CHOLESTEROL/HDL RATIO: 1.7
CO2: 30 MMOL/L
CREAT SERPL-MCNC: 0.7 MG/DL
EOSINOPHILS ABSOLUTE: 0.1 /ΜL
EOSINOPHILS RELATIVE PERCENT: 1 %
GFR CALCULATED: 81
GLUCOSE BLD-MCNC: 80 MG/DL
HCT VFR BLD CALC: 38.9 % (ref 36–46)
HDLC SERPL-MCNC: 49 MG/DL (ref 35–70)
HEMOGLOBIN: 13.3 G/DL (ref 12–16)
LDL CHOLESTEROL CALCULATED: 82 MG/DL (ref 0–160)
LYMPHOCYTES ABSOLUTE: 2.4 /ΜL
LYMPHOCYTES RELATIVE PERCENT: 32.7 %
MCH RBC QN AUTO: 33.3 PG
MCHC RBC AUTO-ENTMCNC: 34.2 G/DL
MCV RBC AUTO: 97.4 FL
MONOCYTES ABSOLUTE: 0.9 /ΜL
MONOCYTES RELATIVE PERCENT: 11.8 %
NEUTROPHILS ABSOLUTE: 3.9 /ΜL
NEUTROPHILS RELATIVE PERCENT: 53.7 %
PLATELET # BLD: 120 K/ΜL
PMV BLD AUTO: 10.9 FL
POTASSIUM SERPL-SCNC: 4.6 MMOL/L
RBC # BLD: 3.99 10^6/ΜL
SODIUM BLD-SCNC: 139 MMOL/L
TOTAL PROTEIN: 5.8
TRIGL SERPL-MCNC: 135 MG/DL
VLDLC SERPL CALC-MCNC: 27 MG/DL
WBC # BLD: 7.3 10^3/ML

## 2020-03-17 ENCOUNTER — TELEPHONE (OUTPATIENT)
Dept: INTERNAL MEDICINE | Age: 80
End: 2020-03-17

## 2020-03-17 NOTE — TELEPHONE ENCOUNTER
Kayla called to say pt has a very bad external hemorrhoid. She is dripping blood. They are applying the hemorrhoid using cream, but does not look like its getting any smaller. She would like to know if Dr. Marisa Hernandez thinks pt should have a CBC.      kayla ph: [phone]

## 2020-03-19 NOTE — TELEPHONE ENCOUNTER
nkechi called since she had not heard back yet. she believes that pt has scratched area and is still bleeding. Still doing the ointment, feels it has shrunk some but still bleeding.

## 2020-03-23 RX ORDER — CHLORHEXIDINE GLUCONATE 0.12 MG/ML
15 RINSE ORAL 2 TIMES DAILY
COMMUNITY

## 2020-05-06 ENCOUNTER — VIRTUAL VISIT (OUTPATIENT)
Dept: INTERNAL MEDICINE | Age: 80
End: 2020-05-06
Payer: MEDICARE

## 2020-05-06 VITALS
SYSTOLIC BLOOD PRESSURE: 157 MMHG | RESPIRATION RATE: 16 BRPM | DIASTOLIC BLOOD PRESSURE: 87 MMHG | HEART RATE: 80 BPM | OXYGEN SATURATION: 92 % | TEMPERATURE: 97.5 F

## 2020-05-06 PROCEDURE — 4040F PNEUMOC VAC/ADMIN/RCVD: CPT | Performed by: FAMILY MEDICINE

## 2020-05-06 PROCEDURE — 1123F ACP DISCUSS/DSCN MKR DOCD: CPT | Performed by: FAMILY MEDICINE

## 2020-05-06 PROCEDURE — G0439 PPPS, SUBSEQ VISIT: HCPCS | Performed by: FAMILY MEDICINE

## 2020-05-06 RX ORDER — ATROPINE SULFATE 10 MG/ML
1 SOLUTION/ DROPS OPHTHALMIC DAILY
Status: ON HOLD | COMMUNITY
End: 2022-10-19 | Stop reason: HOSPADM

## 2020-05-06 RX ORDER — ALBUTEROL SULFATE 2.5 MG/3ML
2.5 SOLUTION RESPIRATORY (INHALATION) EVERY 6 HOURS PRN
COMMUNITY

## 2020-05-06 ASSESSMENT — LIFESTYLE VARIABLES: HOW OFTEN DO YOU HAVE A DRINK CONTAINING ALCOHOL: 0

## 2020-05-06 ASSESSMENT — PATIENT HEALTH QUESTIONNAIRE - PHQ9
SUM OF ALL RESPONSES TO PHQ QUESTIONS 1-9: 0
SUM OF ALL RESPONSES TO PHQ QUESTIONS 1-9: 0

## 2020-05-06 NOTE — PATIENT INSTRUCTIONS
Personalized Preventive Plan for Heaven Laurent - 5/6/2020  Medicare offers a range of preventive health benefits. Some of the tests and screenings are paid in full while other may be subject to a deductible, co-insurance, and/or copay. Some of these benefits include a comprehensive review of your medical history including lifestyle, illnesses that may run in your family, and various assessments and screenings as appropriate. After reviewing your medical record and screening and assessments performed today your provider may have ordered immunizations, labs, imaging, and/or referrals for you. A list of these orders (if applicable) as well as your Preventive Care list are included within your After Visit Summary for your review. Other Preventive Recommendations:    · A preventive eye exam performed by an eye specialist is recommended every 1-2 years to screen for glaucoma; cataracts, macular degeneration, and other eye disorders. · A preventive dental visit is recommended every 6 months. · Try to get at least 150 minutes of exercise per week or 10,000 steps per day on a pedometer . · Order or download the FREE \"Exercise & Physical Activity: Your Everyday Guide\" from [employer]. Call 6-656.872.3666 or search [employer] online. · You need 5149-0805 mg of calcium and 8869-0364 IU of vitamin D per day. It is possible to meet your calcium requirement with diet alone, but a vitamin D supplement is usually necessary to meet this goal.  · When exposed to the sun, use a sunscreen that protects against both UVA and UVB radiation with an SPF of 30 or greater. Reapply every 2 to 3 hours or after sweating, drying off with a towel, or swimming. · Always wear a seat belt when traveling in a car. Always wear a helmet when riding a bicycle or motorcycle.

## 2020-05-06 NOTE — PROGRESS NOTES
outside providers/suppliers regularly involved in providing care):   Patient Care Team:  Ammon Francisco MD as PCP - General (Family Medicine)  Ammon Francisco MD as PCP - [facility] Empaneled Provider    Wt Readings from Last 3 Encounters:   10/09/19 171 lb 6.4 oz (77.7 kg)   04/11/19 161 lb 11.2 oz (73.3 kg)   03/27/19 163 lb 1.6 oz (74 kg)     Vitals:    05/06/20 1144   BP: (!) 157/87   Pulse: 80   Resp: 16   Temp: 97.5 °F (36.4 °C)   TempSrc: Oral   SpO2: 92%     There is no height or weight on file to calculate BMI. Based upon direct observation of the patient, evaluation of cognition reveals global memory impairment noted. Patient's complete Health Risk Assessment and screening values have been reviewed and are found in Flowsheets. The following problems were reviewed today and where indicated follow up appointments were made and/or referrals ordered. Positive Risk Factor Screenings with Interventions:     General Health:  General  In general, how would you say your health is?: Excellent  In the past 7 days, have you experienced any of the following? New or Increased Pain, New or Increased Fatigue, Loneliness, Social Isolation, Stress or Anger?: None of These  Do you get the social and emotional support that you need?: Yes  Do you have a Living Will?: (!) No  General Health Risk Interventions:  · No Living Will: code status and POA done    Health Habits/Nutrition:  Health Habits/Nutrition  Do you exercise for at least 20 minutes 2-3 times per week?: (!) No  Have you lost any weight without trying in the past 3 months?: No  Do you eat fewer than 2 meals per day?: No  Have you seen a dentist within the past year?: Yes  There is no height or weight on file to calculate BMI. Health Habits/Nutrition Interventions:  · Inadequate physical activity:  pt unable    ADL:  ADLs  In the past 7 days, did you need help from others to perform any of the following everyday activities?  Eating, dressing, grooming, bathing, DEXA (modify frequency per FRAX score)  Completed    Shingles Vaccine  Completed    Pneumococcal 65+ years Vaccine  Completed    Hepatitis A vaccine  Aged Out    Hepatitis B vaccine  Aged Out    Hib vaccine  Aged Out    Meningococcal (ACWY) vaccine  Aged Out     Recommendations for [employer] Due: see orders and patient instructions/AVS.  . Recommended screening schedule for the next 5-10 years is provided to the patient in written form: see Patient Marques Golden was seen today for medicare awv. Diagnoses and all orders for this visit:    Routine general medical examination at a health care facility    Mood disorder [facility])  Has been stable  No med changes  Seeing psych    Seizure disorder [facility])  No seizures since last seen  Has been stable   Sees neuro      Per caregiver, sleeping more, does not want to socialize otherwise labs and vitals normal.     BP Readings from Last 3 Encounters:   05/06/20 (!) 157/87   10/09/19 128/80   04/11/19 110/70     TELEHEALTH EVALUATION -- Audio/Visual (During HCFZO-59 public health emergency)    Due to [place] 19 outbreak, patient's office visit was converted to a virtual visit. Patient was contacted and agreed to proceed with a virtual visit via [email or web address]  The risks and benefits of converting to a virtual visit were discussed in light of the current infectious disease epidemic. Patient also understood that insurance coverage and co-pays are up to their individual insurance plans. Pursuant to the emergency declaration under the [facility], 1135 waiver authority and the [employer] Act, this Virtual  Visit was conducted, with patient's consent, to reduce the patient's risk of exposure to COVID-19 and provide continuity of care for an established patient.     Services were provided through a video discussion virtually to substitute for in-person clinic

## 2020-05-12 LAB — TSH SERPL DL<=0.05 MIU/L-ACNC: 1.44 UIU/ML

## 2020-06-25 ENCOUNTER — HOSPITAL ENCOUNTER (OUTPATIENT)
Age: 80
Setting detail: SPECIMEN
Discharge: HOME | End: 2020-06-25
Payer: MEDICARE

## 2020-06-25 LAB
BACTERIA: NEGATIVE /HPF
BILIRUBIN URINE: NEGATIVE
BLOOD, URINE: NEGATIVE
CLARITY: CLEAR
COLOR: YELLOW
EPITHELIAL CELLS, UA: ABNORMAL /HPF (ref 0–5)
GLUCOSE URINE: NEGATIVE MG/DL
HYALINE CASTS: ABNORMAL /HPF (ref 0–5)
KETONES, URINE: 15 MG/DL
LEUKOCYTE ESTERASE, URINE: ABNORMAL
NITRITE, URINE: NEGATIVE
PH UA: 5 (ref 5–9)
PROTEIN UA: NEGATIVE MG/DL
RBC UA: ABNORMAL /HPF (ref 0–5)
SPECIFIC GRAVITY UA: 1.02 (ref 1–1.03)
UROBILINOGEN, URINE: 0.2 E.U./DL
WBC UA: ABNORMAL /HPF (ref 0–5)

## 2020-06-25 PROCEDURE — 81001 URINALYSIS AUTO W/SCOPE: CPT

## 2020-07-21 LAB
ALBUMIN SERPL-MCNC: 3.5 G/DL
ALP BLD-CCNC: 56 U/L
ALT SERPL-CCNC: 9 U/L
ANION GAP SERPL CALCULATED.3IONS-SCNC: 1.7 MMOL/L
AST SERPL-CCNC: 19 U/L
BASOPHILS ABSOLUTE: NORMAL
BASOPHILS RELATIVE PERCENT: 0.6 %
BILIRUB SERPL-MCNC: 0.4 MG/DL (ref 0.1–1.4)
BUN BLDV-MCNC: 12 MG/DL
CALCIUM SERPL-MCNC: 8.5 MG/DL
CHLORIDE BLD-SCNC: 101 MMOL/L
CHOLESTEROL, TOTAL: 159 MG/DL
CHOLESTEROL/HDL RATIO: 1.8
CO2: 29 MMOL/L
CREAT SERPL-MCNC: 0.7 MG/DL
EOSINOPHILS ABSOLUTE: NORMAL
EOSINOPHILS RELATIVE PERCENT: 0.8 %
GFR CALCULATED: NORMAL
GLUCOSE BLD-MCNC: 72 MG/DL
HCT VFR BLD CALC: 36.9 % (ref 36–46)
HDLC SERPL-MCNC: 50 MG/DL (ref 35–70)
HEMOGLOBIN: 12.8 G/DL (ref 12–16)
LDL CHOLESTEROL CALCULATED: 88 MG/DL (ref 0–160)
LYMPHOCYTES ABSOLUTE: 2.6 /ΜL
LYMPHOCYTES RELATIVE PERCENT: 51.3 %
MCH RBC QN AUTO: 32.9 PG
MCHC RBC AUTO-ENTMCNC: 34.5 G/DL
MCV RBC AUTO: 952 FL
MONOCYTES ABSOLUTE: 0.6 /ΜL
MONOCYTES RELATIVE PERCENT: 11.9 %
NEUTROPHILS ABSOLUTE: 1.8 /ΜL
NEUTROPHILS RELATIVE PERCENT: 35.4 %
NONHDLC SERPL-MCNC: NORMAL MG/DL
PLATELET # BLD: 122 K/ΜL
PMV BLD AUTO: 10.2 FL
POTASSIUM SERPL-SCNC: 4.5 MMOL/L
RBC # BLD: 3.88 10^6/ΜL
SODIUM BLD-SCNC: 138 MMOL/L
TOTAL PROTEIN: 5.6
TRIGL SERPL-MCNC: 105 MG/DL
VITAMIN D 25-HYDROXY: 22.39
VITAMIN D2, 25 HYDROXY: NORMAL
VITAMIN D3,25 HYDROXY: NORMAL
VLDLC SERPL CALC-MCNC: 21 MG/DL
WBC # BLD: 5.1 10^3/ML

## 2020-09-01 LAB — AST SERPL-CCNC: 18 U/L

## 2020-10-02 RX ORDER — SIMVASTATIN 20 MG
20 TABLET ORAL NIGHTLY
Status: ON HOLD | COMMUNITY
End: 2022-10-19 | Stop reason: HOSPADM

## 2020-11-04 ENCOUNTER — APPOINTMENT (OUTPATIENT)
Dept: GENERAL RADIOLOGY | Age: 80
End: 2020-11-04
Payer: MEDICARE

## 2020-11-04 ENCOUNTER — HOSPITAL ENCOUNTER (EMERGENCY)
Age: 80
Discharge: ANOTHER HOSPITAL | End: 2020-11-05
Attending: EMERGENCY MEDICINE
Payer: MEDICARE

## 2020-11-04 VITALS
HEART RATE: 91 BPM | OXYGEN SATURATION: 97 % | SYSTOLIC BLOOD PRESSURE: 117 MMHG | DIASTOLIC BLOOD PRESSURE: 71 MMHG | TEMPERATURE: 99.1 F | BODY MASS INDEX: 21.33 KG/M2 | RESPIRATION RATE: 22 BRPM | HEIGHT: 65 IN | WEIGHT: 128 LBS

## 2020-11-04 PROBLEM — U07.1 PNEUMONIA DUE TO COVID-19 VIRUS: Status: ACTIVE | Noted: 2020-11-04

## 2020-11-04 PROBLEM — J18.9 PNEUMONIA: Status: ACTIVE | Noted: 2020-11-04

## 2020-11-04 PROBLEM — J12.82 PNEUMONIA DUE TO COVID-19 VIRUS: Status: ACTIVE | Noted: 2020-11-04

## 2020-11-04 LAB
ALBUMIN SERPL-MCNC: 4.4 G/DL (ref 3.5–4.6)
ALP BLD-CCNC: 74 U/L (ref 40–130)
ALT SERPL-CCNC: 21 U/L (ref 0–33)
ANION GAP SERPL CALCULATED.3IONS-SCNC: 14 MEQ/L (ref 9–15)
AST SERPL-CCNC: 35 U/L (ref 0–35)
BACTERIA: NEGATIVE /HPF
BASOPHILS ABSOLUTE: 0 K/UL (ref 0–0.2)
BASOPHILS RELATIVE PERCENT: 0.2 %
BILIRUB SERPL-MCNC: 0.4 MG/DL (ref 0.2–0.7)
BILIRUBIN URINE: ABNORMAL
BLOOD, URINE: ABNORMAL
BUN BLDV-MCNC: 14 MG/DL (ref 8–23)
CALCIUM SERPL-MCNC: 10 MG/DL (ref 8.5–9.9)
CHLORIDE BLD-SCNC: 97 MEQ/L (ref 95–107)
CLARITY: CLEAR
CO2: 25 MEQ/L (ref 20–31)
COLOR: YELLOW
CREAT SERPL-MCNC: 0.54 MG/DL (ref 0.5–0.9)
EOSINOPHILS ABSOLUTE: 0 K/UL (ref 0–0.7)
EOSINOPHILS RELATIVE PERCENT: 0.1 %
EPITHELIAL CELLS, UA: NORMAL /HPF
GFR AFRICAN AMERICAN: >60
GFR NON-AFRICAN AMERICAN: >60
GLOBULIN: 2.9 G/DL (ref 2.3–3.5)
GLUCOSE BLD-MCNC: 156 MG/DL (ref 70–99)
GLUCOSE URINE: NEGATIVE MG/DL
HCT VFR BLD CALC: 42.7 % (ref 37–47)
HEMOGLOBIN: 14.5 G/DL (ref 12–16)
KETONES, URINE: 15 MG/DL
LACTIC ACID: 3.3 MMOL/L (ref 0.5–2.2)
LEUKOCYTE ESTERASE, URINE: NEGATIVE
LYMPHOCYTES ABSOLUTE: 1.9 K/UL (ref 1–4.8)
LYMPHOCYTES RELATIVE PERCENT: 27.6 %
MAGNESIUM: 1.9 MG/DL (ref 1.7–2.4)
MCH RBC QN AUTO: 33.5 PG (ref 27–31.3)
MCHC RBC AUTO-ENTMCNC: 33.9 % (ref 33–37)
MCV RBC AUTO: 98.8 FL (ref 82–100)
MONOCYTES ABSOLUTE: 1.3 K/UL (ref 0.2–0.8)
MONOCYTES RELATIVE PERCENT: 19.1 %
NEUTROPHILS ABSOLUTE: 3.6 K/UL (ref 1.4–6.5)
NEUTROPHILS RELATIVE PERCENT: 53 %
NITRITE, URINE: NEGATIVE
PDW BLD-RTO: 13.3 % (ref 11.5–14.5)
PH UA: 6 (ref 5–9)
PLATELET # BLD: 109 K/UL (ref 130–400)
POTASSIUM SERPL-SCNC: 3.8 MEQ/L (ref 3.4–4.9)
PROTEIN UA: 30 MG/DL
RBC # BLD: 4.32 M/UL (ref 4.2–5.4)
RBC UA: NORMAL /HPF (ref 0–2)
SARS-COV-2, NAAT: DETECTED
SODIUM BLD-SCNC: 136 MEQ/L (ref 135–144)
SPECIFIC GRAVITY UA: 1.02 (ref 1–1.03)
TOTAL PROTEIN: 7.3 G/DL (ref 6.3–8)
URINE REFLEX TO CULTURE: ABNORMAL
UROBILINOGEN, URINE: 0.2 E.U./DL
VALPROIC ACID LEVEL: 84.9 UG/ML (ref 50–100)
WBC # BLD: 6.8 K/UL (ref 4.8–10.8)
WBC UA: NORMAL /HPF (ref 0–5)

## 2020-11-04 PROCEDURE — 6360000002 HC RX W HCPCS: Performed by: EMERGENCY MEDICINE

## 2020-11-04 PROCEDURE — 96374 THER/PROPH/DIAG INJ IV PUSH: CPT

## 2020-11-04 PROCEDURE — 83735 ASSAY OF MAGNESIUM: CPT

## 2020-11-04 PROCEDURE — 36415 COLL VENOUS BLD VENIPUNCTURE: CPT

## 2020-11-04 PROCEDURE — 85025 COMPLETE CBC W/AUTO DIFF WBC: CPT

## 2020-11-04 PROCEDURE — 80053 COMPREHEN METABOLIC PANEL: CPT

## 2020-11-04 PROCEDURE — U0002 COVID-19 LAB TEST NON-CDC: HCPCS

## 2020-11-04 PROCEDURE — 81001 URINALYSIS AUTO W/SCOPE: CPT

## 2020-11-04 PROCEDURE — 80164 ASSAY DIPROPYLACETIC ACD TOT: CPT

## 2020-11-04 PROCEDURE — 96375 TX/PRO/DX INJ NEW DRUG ADDON: CPT

## 2020-11-04 PROCEDURE — 83605 ASSAY OF LACTIC ACID: CPT

## 2020-11-04 PROCEDURE — 2580000003 HC RX 258: Performed by: EMERGENCY MEDICINE

## 2020-11-04 PROCEDURE — 99285 EMERGENCY DEPT VISIT HI MDM: CPT

## 2020-11-04 PROCEDURE — 87040 BLOOD CULTURE FOR BACTERIA: CPT

## 2020-11-04 PROCEDURE — 6370000000 HC RX 637 (ALT 250 FOR IP): Performed by: EMERGENCY MEDICINE

## 2020-11-04 PROCEDURE — 71045 X-RAY EXAM CHEST 1 VIEW: CPT

## 2020-11-04 RX ORDER — SODIUM CHLORIDE 9 MG/ML
INJECTION, SOLUTION INTRAVENOUS CONTINUOUS
Status: DISCONTINUED | OUTPATIENT
Start: 2020-11-04 | End: 2020-11-05 | Stop reason: HOSPADM

## 2020-11-04 RX ORDER — METHYLPREDNISOLONE SODIUM SUCCINATE 125 MG/2ML
125 INJECTION, POWDER, LYOPHILIZED, FOR SOLUTION INTRAMUSCULAR; INTRAVENOUS ONCE
Status: COMPLETED | OUTPATIENT
Start: 2020-11-04 | End: 2020-11-04

## 2020-11-04 RX ORDER — ACETAMINOPHEN 500 MG
1000 TABLET ORAL ONCE
Status: COMPLETED | OUTPATIENT
Start: 2020-11-04 | End: 2020-11-04

## 2020-11-04 RX ADMIN — METHYLPREDNISOLONE SODIUM SUCCINATE 125 MG: 125 INJECTION, POWDER, FOR SOLUTION INTRAMUSCULAR; INTRAVENOUS at 18:06

## 2020-11-04 RX ADMIN — CEFTRIAXONE 1 G: 1 INJECTION, POWDER, FOR SOLUTION INTRAMUSCULAR; INTRAVENOUS at 19:04

## 2020-11-04 RX ADMIN — SODIUM CHLORIDE: 9 INJECTION, SOLUTION INTRAVENOUS at 18:05

## 2020-11-04 RX ADMIN — ACETAMINOPHEN 1000 MG: 500 TABLET ORAL at 18:06

## 2020-11-04 ASSESSMENT — PAIN SCALES - GENERAL
PAINLEVEL_OUTOF10: 0
PAINLEVEL_OUTOF10: 0

## 2020-11-04 NOTE — ED NOTES
Bed: 07  Expected date: 11/4/20  Expected time: 5:42 PM  Means of arrival:   Comments:  Cough, Short of breath. Glucose. 162. 100.6^F temp. 2 aerosol Tx one at Home one in squad transit. Oxygen 2 liter 97% Sao2. Capnography 20.      Bruna Berg RN  11/04/20 8206

## 2020-11-04 NOTE — ED PROVIDER NOTES
[facility] ED  eMERGENCY dEPARTMENT eNCOUnter      Pt Name: Stanley Baron  MRN: [id number]  [place] 1940  Date of evaluation: 11/4/2020  Provider: Bon Jacob MD    [address] [place] [address]       Chief Complaint   Patient presents with    Cough         HISTORY OF PRESENT ILLNESS   (Location/Symptom, Timing/Onset,Context/Setting, Quality, Duration, Modifying Factors, Severity)  Note limiting factors. Stanley Baron is a [de-identified] y.o. female who presents to the emergency department patient from the group home with mental retardation legally blind nonverbal history of mood disorder seizure disorder history of drug-induced thrombocytopenia in the past aphakia of the left eye and pthisis bulbi of the right eye history of pneumonia in the past become short of breath and felt warm in the group home patient on albuterol treatment she dropped her oxygen saturation and paramedics were called patient was given albuterol treatment in route felt much better after that brought here in a stable condition    HPI    NursingNotes were reviewed. REVIEW OF SYSTEMS    (2-9 systems for level 4, 10 or more for level 5)     Review of Systems   Unable to perform ROS: Patient nonverbal       Except as noted above the remainder of the review of systems was reviewed and negative.        PAST MEDICAL HISTORY     Past Medical History:   Diagnosis Date    Ceruminosis     Chronic rhinitis     Dyslipidemia     History of encephalitis     Large bowel obstruction (HCC)     Legally blind     Mood disorder ([address]     MR (mental retardation)     Osteoporosis     Periodontal disease     Pneumonia 12/9/2018    SBO (small bowel obstruction) ([address] 3/22/2019    Seizure disorder ([address]     Vitamin D deficiency          SURGICALHISTORY       Past Surgical History:   Procedure Laterality Date    COLONOSCOPY N/A 3/22/2019    COLONOSCOPY performed by Marina Tory MD at [address] [place] [address]       Previous Medications    ACETAMINOPHEN (TYLENOL) 325 MG TABLET    Take 650 mg by mouth every 4 hours as needed. May give rectal if unable to tolerate PO administration. Indications: Fever, Pain    ALBUTEROL (PROVENTIL) (2.5 MG/3ML) 0.083% NEBULIZER SOLUTION    Take 2.5 mg by nebulization every 6 hours as needed for Wheezing    ASPIRIN 81 MG TABLET    Take 81 mg by mouth daily    ATROPINE 1 % OPHTHALMIC SOLUTION    Place 1 drop into the left eye daily    BUSPIRONE (BUSPAR) 30 MG TABLET    Take 30 mg by mouth 2 times daily    CALCIUM-VITAMIN D (OYSTER CALCIUM 500 + D) 500-200 MG-UNIT PER TABLET    Take 1 tablet by mouth 2 times daily. Administer with food. Indications: supplement    CARBAMIDE PEROXIDE (DEBROX) 6.5 % OTIC SOLUTION    4 drops 2 times daily Instill 4 drops to affected ears twice daily for 4 days followed by flush on day 5 as needed for cerumen obstruction    CETIRIZINE HCL 10 MG CAPS    Take 10 mg by mouth daily. CHLORHEXIDINE (PERIDEX) 0.12 % SOLUTION    Take 15 mLs by mouth 2 times daily    CLONAZEPAM (KLONOPIN) 1 MG TABLET    Take 1 tablet by mouth 3 times daily for 150 days. DIVALPROEX (DEPAKOTE) 250 MG DR TABLET    Take 250 mg by mouth nightly Give with 500 mg to equal 750    DIVALPROEX SODIUM (DEPAKOTE ER PO)    Take 500 mg by mouth 2 times daily     DOCUSATE SODIUM (COLACE) 100 MG CAPSULE    Take 100 mg by mouth daily Indications: Chronic Constipation Give 2 capsules daily. Hold for loose stools. FLUTICASONE (FLONASE) 50 MCG/ACT NASAL SPRAY    1 spray by Nasal route 2 times daily. Indications: Allergic Rhinitis    HYDROCORTISONE (ANUSOL-HC) 2.5 % RECTAL CREAM    Place 2.5 g rectally 3 times daily as needed. Indications: Hemorrhoids    IBUPROFEN (ADVIL;MOTRIN) 200 MG TABLET    Take 200 mg by mouth every 6 hours as needed for Pain Two tablets every 6 hours as needed.     LACTULOSE ENCEPHALOPATHY 10 GM/15ML SOLN SOLUTION    Take 10 g by mouth daily Give 15 mg by mouth once daily    MAGNESIUM HYDROXIDE (MILK OF MAGNESIA PO)    Take 30 mLs by mouth daily as needed. Indications: Constipation    MULTIPLE VITAMIN (MULTIVITAMIN PO)    Take 1 tablet by mouth daily. Indications: multivitamin    MUPIROCIN (BACTROBAN) 2 % OINTMENT    Indications: History MRSA. Apply INTRANASALLY BID. RUB NARES TOGETHER. NEOMYCIN-BACITRACIN-POLYMYXIN (NEOSPORIN) 5-400-5000 OINTMENT    Apply topically 4 times daily Apply topically 4 times daily. NIACIN (NIASPAN) 500 MG CR TABLET    Take 500 mg by mouth daily Indications: Dyslipidemia     OXYGEN    Inhale 2 L/min into the lungs as needed. ADMINISTER VIA N/C.  TITRATE PRN TO MAINTAIN PULSE OX ABOVE 92%. Indications: Hypoxia    POLYVINYL ALCOHOL-POVIDONE (REFRESH OP)    Apply to eye One drop each eye four times a day    PROBIOTIC PRODUCT (PROBIOTIC PO)    Take by mouth    PROTEIN POWD    Take by mouth 2 scoops daily (1 @ breakfast and 1 @dinner)    PSEUDOEPHEDRINE-CODEINE-GUAIFENESIN (MYTUSSIN DAC) 30- MG/5ML SOLUTION    Take 10 mLs by mouth 4 times daily as needed for Cough    PSYLLIUM (REGULOID) 58.6 % POWDER    Take 1 packet by mouth 3 times daily Take by mouth 3 times daily. QUETIAPINE (SEROQUEL) 100 MG TABLET    Take 100 mg by mouth 3 times daily    SIMVASTATIN (ZOCOR) 20 MG TABLET    Take 20 mg by mouth nightly    SODIUM CHLORIDE (OCEAN, BABY AYR) 0.65 % NASAL SPRAY    1 spray by Nasal route as needed for Congestion    SODIUM PHOSPHATES (FLEET)    Place 1 enema rectally daily as needed.  Indications: Constipation    SUNSCREENS (CHAPSTICK) STCK    Apply topically as needed    VITAMIN D (ERGOCALCIFEROL) 20515 UNITS CAPS CAPSULE    Take 50,000 Units by mouth once a week Indications: Vitamin D Deficiency     ZINC OXIDE 13 % CREA    Apply topically 2 times daily as needed for Rash    ZOSTER RECOMBINANT ADJUVANTED VACCINE (SHINGRIX) 50 MCG/0.5ML SUSR INJECTION    Inject 0.5 mLs into the muscle See Admin Instructions 1 dose now and repeat in 2-6 months       ALLERGIES Patient has no known allergies. FAMILY HISTORY     History reviewed. No pertinent family history. SOCIAL HISTORY       Social History     Socioeconomic History    Marital status: Single     Spouse name: None    Number of children: None    Years of education: None    Highest education level: None   Occupational History    None   Social Needs    Financial resource strain: None    Food insecurity     Worry: None     Inability: None    Transportation needs     Medical: None     Non-medical: None   Tobacco Use    Smoking status: Never Smoker    Smokeless tobacco: Never Used   Substance and Sexual Activity    Alcohol use: No    Drug use: No    Sexual activity: Never   Lifestyle    Physical activity     Days per week: None     Minutes per session: None    Stress: None   Relationships    Social connections     Talks on phone: None     Gets together: None     Attends Restoration service: None     Active member of club or organization: None     Attends meetings of clubs or organizations: None     Relationship status: None    Intimate partner violence     Fear of current or ex partner: None     Emotionally abused: None     Physically abused: None     Forced sexual activity: None   Other Topics Concern    None   Social History Narrative    ** Merged History Encounter **            SCREENINGS    [place] Coma Scale  Eye Opening: Spontaneous  Best Verbal Response: Incomprehensible speech  Best Motor Response: Localizes pain  [place] Coma Scale Score: 11 @FLOW([id number])@      PHYSICAL EXAM    (up to 7 for level 4, 8 or more for level 5)     ED Triage Vitals [11/04/20 1744]   BP Temp Temp Source Pulse Resp SpO2 Height Weight   129/66 99.4 °F (37.4 °C) Axillary 107 20 98 % 5' 5\" (1.651 m) 128 lb (58.1 kg)       Physical Exam  Vitals signs and nursing note reviewed. Constitutional:       General: She is not in acute distress.      Comments: Patient alerts response to verbal command work of breathing is not labored at this time   HENT:      Right Ear: Tympanic membrane, ear canal and external ear normal.      Left Ear: Tympanic membrane, ear canal and external ear normal.      Mouth/Throat:      Mouth: Mucous membranes are moist.      Pharynx: No oropharyngeal exudate or posterior oropharyngeal erythema. Neck:      Musculoskeletal: Normal range of motion. No neck rigidity or muscular tenderness. Vascular: No carotid bruit. Cardiovascular:      Rate and Rhythm: Regular rhythm. Tachycardia present. Pulses: Normal pulses. Heart sounds: No murmur. No friction rub. No gallop. Pulmonary:      [place]: No respiratory distress. Breath sounds: Wheezing present. No rales. Comments: Attention turned to the respiratory system patient work of breathing not labored no retractions and no use of accessory muscles patient has a minimal faint wheezing bilaterally  Chest:      Chest wall: No tenderness. Abdominal:      General: Abdomen is flat. There is no distension. Palpations: There is no mass. Tenderness: There is no abdominal tenderness. There is no right CVA tenderness, left CVA tenderness, guarding or rebound. Hernia: No hernia is present. Musculoskeletal:         General: No swelling. Right lower leg: No edema. Lymphadenopathy:      Cervical: No cervical adenopathy. Skin:     Capillary Refill: Capillary refill takes less than 2 seconds. Coloration: Skin is not jaundiced. Findings: No bruising, erythema, lesion or rash. Neurological:      General: No focal deficit present. Mental Status: She is alert.       Comments: Unable to perform neurological examination as patient due to patient condition   Psychiatric:         Mood and Affect: Mood normal.         DIAGNOSTIC RESULTS     EKG: All EKG's are interpreted by the Emergency Department Physician who either signs or Co-signsthis chart in the absence of a cardiologist.        RADIOLOGY:   Eduardo Doe such as CT, Ultrasound and MRI are read by the radiologist. Plain radiographic images are visualized and preliminarily interpreted by the emergency physician with the below findings:    nterpretation per the Radiologist below, if available at the time ofthis note:    XR CHEST PORTABLE   Final Result   No radiographic evidence of acute intrathoracic process. No significant change since previous studies. No infiltrative bone study with bronchovascular crowding which is likely secondary to thoracic kyphosis. ED BEDSIDE ULTRASOUND:   Performed by ED Physician - none    LABS:  Labs Reviewed   COMPREHENSIVE METABOLIC PANEL - Abnormal; Notable for the following components:       Result Value    Glucose 156 (*)     Calcium 10.0 (*)     All other components within normal limits    Narrative:     [facility] tel. [phone],  Chemistry results called to and read back by Dionne Snellen RN, 11/04/2020 18:26, by  [facility]   CBC WITH AUTO DIFFERENTIAL - Abnormal; Notable for the following components:    MCH 33.5 (*)     Platelets 017 (*)     Monocytes Absolute 1.3 (*)     All other components within normal limits   LACTIC ACID, PLASMA - Abnormal; Notable for the following components:    Lactic Acid 3.3 (*)     All other components within normal limits    Narrative:     [address] [phone],  Chemistry results called to and read back by Dionne Snellen RN, 11/04/2020 18:26, by  [facility]   URINE RT REFLEX TO CULTURE - Abnormal; Notable for the following components:    Ketones, Urine 15 (*)     Protein, UA 30 (*)     All other components within normal limits   CULTURE, BLOOD 1   CULTURE, BLOOD 2   MAGNESIUM    Narrative:     [facility]. [phone],  Chemistry results called to and read back by Dionne Snellen RN, 11/04/2020 18:26, by  [address] [place] [address]   COVID-19   COVID-19   COVID-19   VALPROIC ACID LEVEL, FREE       All other labs were within normal range or not returned as of this dictation.     EMERGENCY

## 2020-11-05 ENCOUNTER — APPOINTMENT (OUTPATIENT)
Dept: ULTRASOUND IMAGING | Age: 80
DRG: 177 | End: 2020-11-05
Attending: INTERNAL MEDICINE
Payer: MEDICARE

## 2020-11-05 ENCOUNTER — HOSPITAL ENCOUNTER (INPATIENT)
Age: 80
LOS: 5 days | Discharge: HOME | DRG: 177 | End: 2020-11-10
Attending: INTERNAL MEDICINE | Admitting: INTERNAL MEDICINE
Payer: MEDICARE

## 2020-11-05 LAB
ABO/RH: NORMAL
ANTIBODY SCREEN: NORMAL
APTT: 36.4 SEC (ref 24.4–36.8)
C-REACTIVE PROTEIN: 4.8 MG/L (ref 0–5)
D DIMER: 0.54 MG/L FEU (ref 0–0.5)
FERRITIN: 142.5 NG/ML (ref 13–150)
FIBRINOGEN: 305 MG/DL (ref 235–507)
INR BLD: 0.9
LACTATE DEHYDROGENASE: 194 U/L (ref 135–214)
LACTIC ACID: 3 MMOL/L (ref 0.5–2.2)
PROCALCITONIN: 0.04 NG/ML (ref 0–0.15)
PROTHROMBIN TIME: 12.7 SEC (ref 12.3–14.9)
TROPONIN: <0.01 NG/ML (ref 0–0.01)

## 2020-11-05 PROCEDURE — 6360000002 HC RX W HCPCS: Performed by: NURSE PRACTITIONER

## 2020-11-05 PROCEDURE — 84145 PROCALCITONIN (PCT): CPT

## 2020-11-05 PROCEDURE — 2500000003 HC RX 250 WO HCPCS: Performed by: INTERNAL MEDICINE

## 2020-11-05 PROCEDURE — 6360000002 HC RX W HCPCS: Performed by: INTERNAL MEDICINE

## 2020-11-05 PROCEDURE — 85730 THROMBOPLASTIN TIME PARTIAL: CPT

## 2020-11-05 PROCEDURE — 86900 BLOOD TYPING SEROLOGIC ABO: CPT

## 2020-11-05 PROCEDURE — 94761 N-INVAS EAR/PLS OXIMETRY MLT: CPT

## 2020-11-05 PROCEDURE — 51798 US URINE CAPACITY MEASURE: CPT

## 2020-11-05 PROCEDURE — 82728 ASSAY OF FERRITIN: CPT

## 2020-11-05 PROCEDURE — 36415 COLL VENOUS BLD VENIPUNCTURE: CPT

## 2020-11-05 PROCEDURE — 94664 DEMO&/EVAL PT USE INHALER: CPT

## 2020-11-05 PROCEDURE — 6370000000 HC RX 637 (ALT 250 FOR IP): Performed by: INTERNAL MEDICINE

## 2020-11-05 PROCEDURE — 87449 NOS EACH ORGANISM AG IA: CPT

## 2020-11-05 PROCEDURE — 86140 C-REACTIVE PROTEIN: CPT

## 2020-11-05 PROCEDURE — 85610 PROTHROMBIN TIME: CPT

## 2020-11-05 PROCEDURE — 85379 FIBRIN DEGRADATION QUANT: CPT

## 2020-11-05 PROCEDURE — 2580000003 HC RX 258: Performed by: NURSE PRACTITIONER

## 2020-11-05 PROCEDURE — 86850 RBC ANTIBODY SCREEN: CPT

## 2020-11-05 PROCEDURE — 83615 LACTATE (LD) (LDH) ENZYME: CPT

## 2020-11-05 PROCEDURE — 85384 FIBRINOGEN ACTIVITY: CPT

## 2020-11-05 PROCEDURE — 86901 BLOOD TYPING SEROLOGIC RH(D): CPT

## 2020-11-05 PROCEDURE — 93970 EXTREMITY STUDY: CPT

## 2020-11-05 PROCEDURE — 99222 1ST HOSP IP/OBS MODERATE 55: CPT | Performed by: INTERNAL MEDICINE

## 2020-11-05 PROCEDURE — 84484 ASSAY OF TROPONIN QUANT: CPT

## 2020-11-05 PROCEDURE — 1210000000 HC MED SURG R&B

## 2020-11-05 PROCEDURE — 2580000003 HC RX 258: Performed by: INTERNAL MEDICINE

## 2020-11-05 PROCEDURE — 83605 ASSAY OF LACTIC ACID: CPT

## 2020-11-05 RX ORDER — POLYETHYLENE GLYCOL 3350 17 G/17G
17 POWDER, FOR SOLUTION ORAL DAILY PRN
Status: DISCONTINUED | OUTPATIENT
Start: 2020-11-05 | End: 2020-11-10 | Stop reason: HOSPADM

## 2020-11-05 RX ORDER — SODIUM CHLORIDE 0.9 % (FLUSH) 0.9 %
10 SYRINGE (ML) INJECTION PRN
Status: DISCONTINUED | OUTPATIENT
Start: 2020-11-05 | End: 2020-11-10 | Stop reason: HOSPADM

## 2020-11-05 RX ORDER — ASPIRIN 81 MG/1
81 TABLET, CHEWABLE ORAL DAILY
Status: DISCONTINUED | OUTPATIENT
Start: 2020-11-05 | End: 2020-11-10 | Stop reason: HOSPADM

## 2020-11-05 RX ORDER — ONDANSETRON 2 MG/ML
4 INJECTION INTRAMUSCULAR; INTRAVENOUS EVERY 6 HOURS PRN
Status: DISCONTINUED | OUTPATIENT
Start: 2020-11-05 | End: 2020-11-10 | Stop reason: HOSPADM

## 2020-11-05 RX ORDER — ALBUTEROL SULFATE 2.5 MG/3ML
2.5 SOLUTION RESPIRATORY (INHALATION) EVERY 6 HOURS PRN
Status: DISCONTINUED | OUTPATIENT
Start: 2020-11-05 | End: 2020-11-10 | Stop reason: HOSPADM

## 2020-11-05 RX ORDER — ALBUTEROL SULFATE 90 UG/1
2 AEROSOL, METERED RESPIRATORY (INHALATION) EVERY 6 HOURS PRN
Status: DISCONTINUED | OUTPATIENT
Start: 2020-11-05 | End: 2020-11-10 | Stop reason: HOSPADM

## 2020-11-05 RX ORDER — GUAIFENESIN 100 MG/5ML
10 SOLUTION ORAL 4 TIMES DAILY PRN
Status: DISCONTINUED | OUTPATIENT
Start: 2020-11-05 | End: 2020-11-10 | Stop reason: HOSPADM

## 2020-11-05 RX ORDER — DIAPER,BRIEF,INFANT-TODD,DISP
EACH MISCELLANEOUS 3 TIMES DAILY
Status: ON HOLD | COMMUNITY
End: 2022-10-19 | Stop reason: HOSPADM

## 2020-11-05 RX ORDER — QUETIAPINE FUMARATE 50 MG/1
100 TABLET, FILM COATED ORAL 3 TIMES DAILY
Status: DISCONTINUED | OUTPATIENT
Start: 2020-11-05 | End: 2020-11-10 | Stop reason: HOSPADM

## 2020-11-05 RX ORDER — ACETAMINOPHEN 325 MG/1
650 TABLET ORAL EVERY 6 HOURS PRN
Status: DISCONTINUED | OUTPATIENT
Start: 2020-11-05 | End: 2020-11-05 | Stop reason: SDUPTHER

## 2020-11-05 RX ORDER — PROMETHAZINE HYDROCHLORIDE 12.5 MG/1
12.5 TABLET ORAL EVERY 6 HOURS PRN
Status: DISCONTINUED | OUTPATIENT
Start: 2020-11-05 | End: 2020-11-10 | Stop reason: HOSPADM

## 2020-11-05 RX ORDER — BUSPIRONE HYDROCHLORIDE 10 MG/1
30 TABLET ORAL 2 TIMES DAILY
Status: DISCONTINUED | OUTPATIENT
Start: 2020-11-05 | End: 2020-11-10 | Stop reason: HOSPADM

## 2020-11-05 RX ORDER — LANOLIN ALCOHOL/MO/W.PET/CERES
500 CREAM (GRAM) TOPICAL NIGHTLY
COMMUNITY
End: 2021-01-15 | Stop reason: SDUPTHER

## 2020-11-05 RX ORDER — SODIUM CHLORIDE 0.9 % (FLUSH) 0.9 %
10 SYRINGE (ML) INJECTION EVERY 12 HOURS SCHEDULED
Status: DISCONTINUED | OUTPATIENT
Start: 2020-11-05 | End: 2020-11-10 | Stop reason: HOSPADM

## 2020-11-05 RX ORDER — CLONAZEPAM 1 MG/1
1 TABLET ORAL 3 TIMES DAILY
Status: DISCONTINUED | OUTPATIENT
Start: 2020-11-05 | End: 2020-11-10 | Stop reason: HOSPADM

## 2020-11-05 RX ORDER — DOCUSATE SODIUM 100 MG/1
100 CAPSULE, LIQUID FILLED ORAL DAILY
Status: DISCONTINUED | OUTPATIENT
Start: 2020-11-05 | End: 2020-11-10 | Stop reason: HOSPADM

## 2020-11-05 RX ORDER — ACETAMINOPHEN 325 MG/1
650 TABLET ORAL EVERY 4 HOURS PRN
Status: DISCONTINUED | OUTPATIENT
Start: 2020-11-05 | End: 2020-11-10 | Stop reason: HOSPADM

## 2020-11-05 RX ORDER — NIACIN 500 MG/1
500 TABLET, EXTENDED RELEASE ORAL DAILY
Status: DISCONTINUED | OUTPATIENT
Start: 2020-11-05 | End: 2020-11-10 | Stop reason: HOSPADM

## 2020-11-05 RX ORDER — DEXAMETHASONE 4 MG/1
6 TABLET ORAL DAILY
Status: DISCONTINUED | OUTPATIENT
Start: 2020-11-05 | End: 2020-11-10 | Stop reason: HOSPADM

## 2020-11-05 RX ORDER — ACETAMINOPHEN 650 MG/1
650 SUPPOSITORY RECTAL EVERY 6 HOURS PRN
Status: DISCONTINUED | OUTPATIENT
Start: 2020-11-05 | End: 2020-11-05 | Stop reason: SDUPTHER

## 2020-11-05 RX ORDER — ACETAMINOPHEN 325 MG/1
650 TABLET ORAL EVERY 6 HOURS PRN
Status: DISCONTINUED | OUTPATIENT
Start: 2020-11-05 | End: 2020-11-10 | Stop reason: HOSPADM

## 2020-11-05 RX ORDER — LORAZEPAM 2 MG/ML
1 INJECTION INTRAMUSCULAR ONCE
Status: COMPLETED | OUTPATIENT
Start: 2020-11-05 | End: 2020-11-05

## 2020-11-05 RX ORDER — GUAIFENESIN/DEXTROMETHORPHAN 100-10MG/5
5 SYRUP ORAL EVERY 4 HOURS PRN
Status: DISCONTINUED | OUTPATIENT
Start: 2020-11-05 | End: 2020-11-10 | Stop reason: HOSPADM

## 2020-11-05 RX ORDER — ACETAMINOPHEN 650 MG/1
650 SUPPOSITORY RECTAL EVERY 6 HOURS PRN
Status: DISCONTINUED | OUTPATIENT
Start: 2020-11-05 | End: 2020-11-10 | Stop reason: HOSPADM

## 2020-11-05 RX ORDER — DIAPER,BRIEF,INFANT-TODD,DISP
EACH MISCELLANEOUS 2 TIMES DAILY
Status: DISCONTINUED | OUTPATIENT
Start: 2020-11-05 | End: 2020-11-10 | Stop reason: HOSPADM

## 2020-11-05 RX ORDER — LACTULOSE 10 G/15ML
10 SOLUTION ORAL DAILY
Status: DISCONTINUED | OUTPATIENT
Start: 2020-11-05 | End: 2020-11-10 | Stop reason: HOSPADM

## 2020-11-05 RX ORDER — DIVALPROEX SODIUM 500 MG/1
500 TABLET, DELAYED RELEASE ORAL 2 TIMES DAILY
Status: DISCONTINUED | OUTPATIENT
Start: 2020-11-05 | End: 2020-11-10 | Stop reason: HOSPADM

## 2020-11-05 RX ORDER — SIMVASTATIN 20 MG
20 TABLET ORAL NIGHTLY
Status: DISCONTINUED | OUTPATIENT
Start: 2020-11-05 | End: 2020-11-10 | Stop reason: HOSPADM

## 2020-11-05 RX ADMIN — Medication 10 ML: at 09:50

## 2020-11-05 RX ADMIN — LORAZEPAM 1 MG: 2 INJECTION INTRAMUSCULAR; INTRAVENOUS at 20:53

## 2020-11-05 RX ADMIN — DEXAMETHASONE 6 MG: 4 TABLET ORAL at 09:50

## 2020-11-05 RX ADMIN — QUETIAPINE FUMARATE 100 MG: 50 TABLET ORAL at 20:53

## 2020-11-05 RX ADMIN — SIMVASTATIN 20 MG: 20 TABLET, FILM COATED ORAL at 20:54

## 2020-11-05 RX ADMIN — Medication 10 ML: at 20:54

## 2020-11-05 RX ADMIN — REMDESIVIR 200 MG: 100 INJECTION, POWDER, LYOPHILIZED, FOR SOLUTION INTRAVENOUS at 20:55

## 2020-11-05 RX ADMIN — ENOXAPARIN SODIUM 30 MG: 30 INJECTION SUBCUTANEOUS at 20:54

## 2020-11-05 RX ADMIN — DIVALPROEX SODIUM 500 MG: 500 TABLET, DELAYED RELEASE ORAL at 20:54

## 2020-11-05 RX ADMIN — BUSPIRONE HYDROCHLORIDE 30 MG: 10 TABLET ORAL at 20:53

## 2020-11-05 ASSESSMENT — PAIN SCALES - GENERAL
PAINLEVEL_OUTOF10: 0
PAINLEVEL_OUTOF10: 0

## 2020-11-05 NOTE — H&P
[place] [address] MEDICINE    HISTORY AND PHYSICAL EXAM    PATIENT NAME:  Vishnu Faulkner    MRN:  [id number]  SERVICE DATE:  11/5/2020   SERVICE TIME:  4:14 AM    Primary Care Physician: Kemi Sarkar MD         SUBJECTIVE  CHIEF COMPLAINT:  SOB    HPI:  This is a [de-identified] y.o. female w PMH HLD, MR, blindness, seizure disorder, mood disorder who presents as transfer from [facility] w positive COVID test presenting w SOB, cough. She is afebrile, no distress. On 3L O2    Per local ED note:   Vishnu Faulkner is a [de-identified] y.o. female who presents to the emergency department patient from the group home with mental retardation legally blind nonverbal history of mood disorder seizure disorder history of drug-induced thrombocytopenia in the past aphakia of the left eye and pthisis bulbi of the right eye history of pneumonia in the past become short of breath and felt warm in the group home patient on albuterol treatment she dropped her oxygen saturation and paramedics were called patient was given albuterol treatment in route felt much better after that brought here in a stable condition          PAST MEDICAL HISTORY:    Past Medical History:   Diagnosis Date    Ceruminosis     Chronic rhinitis     Dyslipidemia     History of encephalitis     Large bowel obstruction ([address]     Legally blind     [facility] disorder [facility]     MR (mental retardation)     Osteoporosis     Periodontal disease     Pneumonia 12/9/2018    SBO (small bowel obstruction) ([address] 3/22/2019    Seizure disorder ([facility]     Vitamin D deficiency      PAST SURGICAL HISTORY:    Past Surgical History:   Procedure Laterality Date    COLONOSCOPY N/A 3/22/2019    COLONOSCOPY performed by Lewis Yap MD at [address] [place] [address]:  History reviewed. No pertinent family history.   SOCIAL HISTORY:    Social History     Socioeconomic History    Marital status: Single     Spouse name: Not on file    Number of children: Not on file    Years of education: Not on file    Highest education level: Not on file   Occupational History    Not on file   Social Needs    Financial resource strain: Not on file    Food insecurity     Worry: Not on file     Inability: Not on file    Transportation needs     Medical: Not on file     Non-medical: Not on file   Tobacco Use    Smoking status: Never Smoker    Smokeless tobacco: Never Used   Substance and Sexual Activity    Alcohol use: No    Drug use: No    Sexual activity: Never   Lifestyle    Physical activity     Days per week: Not on file     Minutes per session: Not on file    Stress: Not on file   Relationships    Social connections     Talks on phone: Not on file     Gets together: Not on file     Attends Latter day service: Not on file     Active member of club or organization: Not on file     Attends meetings of clubs or organizations: Not on file     Relationship status: Not on file    Intimate partner violence     Fear of current or ex partner: Not on file     Emotionally abused: Not on file     Physically abused: Not on file     Forced sexual activity: Not on file   Other Topics Concern    Not on file   Social History Narrative    ** Merged History Encounter **          MEDICATIONS:   Prior to Admission medications    Medication Sig Start Date End Date Taking? Authorizing Provider   niacin (SLO-NIACIN) 500 MG extended release tablet Take 500 mg by mouth nightly   Yes Historical Provider, MD   hydrocortisone (PREPARATION H) 1 % cream Apply topically 2 times daily Apply topically to hemorrhoids 3 times daily.    Yes Historical Provider, MD   simvastatin (ZOCOR) 20 MG tablet Take 20 mg by mouth nightly   Yes Historical Provider, MD   Polyvinyl Alcohol-Povidone (REFRESH OP) Apply to eye One drop each eye four times a day   Yes Historical Provider, MD   albuterol (PROVENTIL) (2.5 MG/3ML) 0.083% nebulizer solution Take 2.5 mg by nebulization every 6 hours as needed for Wheezing   Yes Historical Provider, MD   atropine 1 % ophthalmic solution Place 1 drop into the left eye daily   Yes Historical Provider, MD   chlorhexidine (PERIDEX) 0.12 % solution Take 15 mLs by mouth 2 times daily Apply to gums. Yes Historical Provider, MD   Protein POWD Take by mouth Give 1 teaspoon (5ml) in water/juice BID   Yes Historical Provider, MD   mupirocin (BACTROBAN) 2 % ointment Indications: History MRSA. Apply INTRANASALLY BID. RUB NARES TOGETHER. 7/23/19  Yes Clint Miller MD   busPIRone (BUSPAR) 30 MG tablet Take 30 mg by mouth 2 times daily   Yes Historical Provider, MD   QUEtiapine (SEROQUEL) 100 MG tablet Take 100 mg by mouth 3 times daily   Yes Historical Provider, MD   neomycin-bacitracin-polymyxin (NEOSPORIN) 5-400-5000 ointment Apply topically 4 times daily Apply topically 4 times daily. Yes Historical Provider, MD   clonazePAM (KLONOPIN) 1 MG tablet Take 1 tablet by mouth 3 times daily for 150 days. 1/31/18 11/5/20 Yes Clint Miller MD   Sunscreens ([address] Apply topically as needed   Yes Historical Provider, MD   aspirin 81 MG tablet Take 81 mg by mouth daily   Yes Historical Provider, MD   divalproex (DEPAKOTE) 250 MG DR tablet Take 500 mg by mouth 2 times daily    Yes Historical Provider, MD   lactulose encephalopathy 10 GM/15ML SOLN solution Take 10 g by mouth daily Give 15 ml by mouth once daily   Yes Historical Provider, MD   psyllium (REGULOID) 58.6 % powder Take 1 packet by mouth 3 times daily Take by mouth 3 times daily.    Yes Historical Provider, MD   sodium chloride (OCEAN, BABY AYR) 0.65 % nasal spray 1 spray by Nasal route as needed for Congestion   Yes Historical Provider, MD   carbamide peroxide (DEBROX) 6.5 % otic solution 4 drops 2 times daily Instill 4 drops to affected ears twice daily for 4 days followed by flush on day 5 as needed for cerumen obstruction   Yes Historical Provider, MD   ibuprofen (ADVIL;MOTRIN) 200 MG tablet Take 200 mg by mouth every 6 hours as needed for Pain Potassium 3.8 3.4 - 4.9 mEq/L    Chloride 97 95 - 107 mEq/L    CO2 25 20 - 31 mEq/L    Anion Gap 14 9 - 15 mEq/L    Glucose 156 (H) 70 - 99 mg/dL    BUN 14 8 - 23 mg/dL    CREATININE 0.54 0.50 - 0.90 mg/dL    GFR Non-African American >60.0 >60    GFR  >60.0 >60    Calcium 10.0 (H) 8.5 - 9.9 mg/dL    Total Protein 7.3 6.3 - 8.0 g/dL    Alb 4.4 3.5 - 4.6 g/dL    Total Bilirubin 0.4 0.2 - 0.7 mg/dL    Alkaline Phosphatase 74 40 - 130 U/L    ALT 21 0 - 33 U/L    AST 35 0 - 35 U/L    Globulin 2.9 2.3 - 3.5 g/dL   CBC Auto Differential    Collection Time: 11/04/20  5:59 PM   Result Value Ref Range    WBC 6.8 4.8 - 10.8 K/uL    RBC 4.32 4.20 - 5.40 M/uL    Hemoglobin 14.5 12.0 - 16.0 g/dL    Hematocrit 42.7 37.0 - 47.0 %    MCV 98.8 82.0 - 100.0 fL    MCH 33.5 (H) 27.0 - 31.3 pg    MCHC 33.9 33.0 - 37.0 %    RDW 13.3 11.5 - 14.5 %    Platelets 746 (L) 925 - 400 K/uL    Neutrophils % 53.0 %    Lymphocytes % 27.6 %    Monocytes % 19.1 %    Eosinophils % 0.1 %    Basophils % 0.2 %    Neutrophils Absolute 3.6 1.4 - 6.5 K/uL    Lymphocytes Absolute 1.9 1.0 - 4.8 K/uL    Monocytes Absolute 1.3 (H) 0.2 - 0.8 K/uL    Eosinophils Absolute 0.0 0.0 - 0.7 K/uL    Basophils Absolute 0.0 0.0 - 0.2 K/uL   Lactic Acid, Plasma    Collection Time: 11/04/20  5:59 PM   Result Value Ref Range    Lactic Acid 3.3 (HH) 0.5 - 2.2 mmol/L   Magnesium    Collection Time: 11/04/20  5:59 PM   Result Value Ref Range    Magnesium 1.9 1.7 - 2.4 mg/dL   Urine Reflex to Culture    Collection Time: 11/04/20  6:15 PM    Specimen: Urine, clean catch   Result Value Ref Range    Color, UA Yellow Straw/Yellow    Clarity, UA Clear Clear    Glucose, Ur Negative Negative mg/dL    Bilirubin Urine Small Negative    Ketones, Urine 15 (A) Negative mg/dL    Specific Gravity, UA 1.025 1.005 - 1.030    Blood, Urine Trace-intact Negative    pH, UA 6.0 5.0 - 9.0    Protein, UA 30 (A) Negative mg/dL    Urobilinogen, Urine 0.2 <2.0 E.U./dL    Nitrite, Urine Negative Negative    Leukocyte Esterase, Urine Negative Negative    Urine Reflex to Culture Not Indicated    Microscopic Urinalysis    Collection Time: 11/04/20  6:15 PM   Result Value Ref Range    WBC, UA 3-5 0 - 5 /HPF    RBC, UA 0-2 0 - 2 /HPF    Epithelial Cells, UA 3-5 /HPF    Bacteria, UA Negative Negative /HPF   COVID-19    Collection Time: 11/04/20  7:52 PM   Result Value Ref Range    SARS-CoV-2, NAAT DETECTED (A) Not Detected       IMAGING:  No results found. VTE Prophylaxis: low molecular weight heparin -  start    ASSESSMENT AND PLAN  Active Problems:    Pneumonia due to COVID-19 virus - tested after presenting to ED w SOB, cough. Duoneb treatment given, improved. On O2 3L w sats in upper 90s. Received solumedrol at OSH,  Minimal wheezing, no distress. Plan: admit   Steroid,  Albuterol inhaler,  ID consult. Seizure disorder (HCC)    Depakote 500 bid   Therapeutic level on labs done last evening. No seizure activity. Plan: continue meds. Dyslipidemia  On statin    No hx stroke, MI or DM2. Sedentary,  Normal heart sounds  Good pulses. Plan: continue statin       Drug-induced thrombocytopenia  Plts 100K,  Likely med induced from depakote. No bleeding     Plan: monitor. Ok to use lovenox. Mood Disorder    Cooperative but anxious. Klonopin, seroquel, buspar. Plan continue same. MR (mental retardation)   Lives in group home -  Appears well cared for. Unintelligible speech,  Blind,     Plan:   Discharge to facility when appropriate. Legally blind   aphakia of the left eye and pthisis bulbi of the right eye per history. No eye drainage or erythema. Plan: monitor.                SIGNATURE: ONELIA Clark - CNP  DATE: November 5, 2020  TIME: 4:14 AM     Iman Art MD - supervising physician

## 2020-11-05 NOTE — FLOWSHEET NOTE
Pt from [facility] in [place]. Pt is IDD, blind, and nonverbal. Covid positive. On 3L NC which she does not wear at home. Pt alert, lois sys for pt safety.

## 2020-11-05 NOTE — PLAN OF CARE
Problem: Airway Clearance - Ineffective  Goal: Achieve or maintain patent airway  11/5/2020 1106 by Miguelina Christine RN  Outcome: Ongoing  11/5/2020 0211 by Melinda Shaw RN  Outcome: Ongoing     Problem: Gas Exchange - Impaired  Goal: Absence of hypoxia  11/5/2020 1106 by Miguelina Christine RN  Outcome: Ongoing  11/5/2020 0211 by Melinda Shaw RN  Outcome: Ongoing     Problem: Breathing Pattern - Ineffective  Goal: Ability to achieve and maintain a regular respiratory rate  11/5/2020 0211 by Melinda Shaw RN  Outcome: Ongoing     Problem:  Body Temperature -  Risk of, Imbalanced  Goal: Ability to maintain a body temperature within defined limits  11/5/2020 0211 by Melinda Shaw RN  Outcome: Ongoing  Goal: Will regain or maintain usual level of consciousness  Outcome: Ongoing  Goal: Complications related to the disease process, condition or treatment will be avoided or minimized  Outcome: Ongoing

## 2020-11-05 NOTE — CONSULTS
Infectious Disease     Patient Name: Leon Jovel  Date: 11/5/2020  YOB: 1940  Medical Record Number: [id number]        COVID-19 infection      History of Present Illness:  Mentally retarded legally blind disorder      Patient presents emergency department from group home thought to be running a fever saturations were decreasing  Presented to [facility] shortness of breath cough no fevers no obvious distress  tested positive for COVID-19  Temperature was 100.6 °F  97% SpO2 with 2 L nasal cannula oxygen       D-Dimer, Quantitative [[id number]] (Abnormal)  Collected: 11/05/20 0648       Specimen: Blood  Updated: 11/05/20 0820        D-Dimer, Quant  0.54  mg/L FEU        Narrative:         Selene Manuel [phone],   DIMER results called to and read back by Steve Patrick, 11/05/2020 08:20, by   Cira Bustos       Lactic Acid, Plasma [[id number]] (Abnormal)  Collected: 11/05/20 0648       Specimen: Blood  Updated: 11/05/20 0756        Lactic Acid  3.0  mmol/L        Narrative:         CALL  [facility]  LC4W tel. [phone],   Lactic acid results called to and read back by Angelina Menezes, 11/05/2020 07:56,   by Isela Brantley       Procalcitonin [[id number]]  Collected: 11/05/20 0648       Specimen: Blood  Updated: 11/05/20 0740        Procalcitonin  0.04  ng/mL        Troponin [[id number]]  Collected: 11/05/20 0648       Specimen: Blood  Updated: 11/05/20 0740        Troponin  <0.010  ng/mL        C-Reactive Protein [[id number]]  Collected: 11/05/20 0648       Specimen: Blood  Updated: 11/05/20 0738        CRP  4.8  mg/L        Lactate Dehydrogenase [[id number]]  Collected: 11/05/20 0648       Specimen: Blood  Updated: 11/05/20 0738        LD  194  U/L        APTT [[id number]]  Collected: 11/05/20 0648       Specimen: Blood  Updated: 11/05/20 0722        aPTT  36.4  sec        Fibrinogen [[id number]]  Collected: 11/05/20 0648       Specimen: Blood  Updated: 11/05/20 0722        Fibrinogen  305.0  mg/dL SARS-CoV-2, NAAT  DETECTEDAbnormal    Not Detected        Component  Value  Ref Range & Units  Status  Collected  Lab    WBC, UA  3-5  0 - 5 /HPF  Final  11/04/2020  6:15 PM  [address] Lab    RBC, UA  0-2  0 - 2 /HPF  Final  11/04/2020  6:15 PM  [address] Lab    Epithelial Cells, UA  3-5  /HPF  Final  11/04/2020  6:15 PM  [address] Lab    Bacteria, UA  Negative  Negative /HPF  Final          Review of Systems   Unable to perform ROS: Patient nonverbal       Review of Systems: All 14 review of systems negative other than as stated above    Social History     Tobacco Use    Smoking status: Never Smoker    Smokeless tobacco: Never Used   Substance Use Topics    Alcohol use: No    Drug use: No         Past Medical History:   Diagnosis Date    Ceruminosis     Chronic rhinitis     Dyslipidemia     History of encephalitis     [facility]     Legally blind     Mood disorder ([facility]     MR (mental retardation)     Osteoporosis     Periodontal disease     Pneumonia 12/9/2018    SBO (small bowel obstruction) ([facility] 3/22/2019    Seizure disorder ([facility]     Vitamin D deficiency            Past Surgical History:   Procedure Laterality Date    COLONOSCOPY N/A 3/22/2019    COLONOSCOPY performed by Hanna St[facility]leton [facility]         No current facility-administered medications on file prior to encounter. Current Outpatient Medications on File Prior to Encounter   Medication Sig Dispense Refill    niacin (SLO-NIACIN) 500 MG extended release tablet Take 500 mg by mouth nightly      hydrocortisone (PREPARATION H) 1 % cream Apply topically 2 times daily Apply topically to hemorrhoids 3 times daily.       simvastatin (ZOCOR) 20 MG tablet Take 20 mg by mouth nightly      Polyvinyl Alcohol-Povidone (REFRESH OP) Apply to eye One drop each eye four times a day      albuterol (PROVENTIL) (2.5 MG/3ML) 0.083% nebulizer solution Take 2.5 mg by nebulization every 6 hours as needed for Wheezing      atropine 1 % ophthalmic solution Place 1 drop into the left eye daily      chlorhexidine (PERIDEX) 0.12 % solution Take 15 mLs by mouth 2 times daily Apply to gums.  Protein POWD Take by mouth Give 1 teaspoon (5ml) in water/juice BID      mupirocin (BACTROBAN) 2 % ointment Indications: History MRSA. Apply INTRANASALLY BID. RUB NARES TOGETHER. 1 Tube 11    busPIRone (BUSPAR) 30 MG tablet Take 30 mg by mouth 2 times daily      QUEtiapine (SEROQUEL) 100 MG tablet Take 100 mg by mouth 3 times daily      neomycin-bacitracin-polymyxin (NEOSPORIN) 5-400-5000 ointment Apply topically 4 times daily Apply topically 4 times daily.  clonazePAM (KLONOPIN) 1 MG tablet Take 1 tablet by mouth 3 times daily for 150 days. 90 tablet 5    Sunscreens (CHAPSTICK) STCK Apply topically as needed      aspirin 81 MG tablet Take 81 mg by mouth daily      divalproex (DEPAKOTE) 250 MG DR tablet Take 500 mg by mouth 2 times daily       lactulose encephalopathy 10 GM/15ML SOLN solution Take 10 g by mouth daily Give 15 ml by mouth once daily      psyllium (REGULOID) 58.6 % powder Take 1 packet by mouth 3 times daily Take by mouth 3 times daily.  sodium chloride (OCEAN, BABY AYR) 0.65 % nasal spray 1 spray by Nasal route as needed for Congestion      carbamide peroxide (DEBROX) 6.5 % otic solution 4 drops 2 times daily Instill 4 drops to affected ears twice daily for 4 days followed by flush on day 5 as needed for cerumen obstruction      ibuprofen (ADVIL;MOTRIN) 200 MG tablet Take 200 mg by mouth every 6 hours as needed for Pain Two tablets every 6 hours as needed.  Probiotic Product (PROBIOTIC PO) Take 1 capsule by mouth 2 times daily as needed       pseudoephedrine-codeine-guaifenesin (MYTUSSIN DAC) 30- MG/5ML solution Take 10 mLs by mouth 4 times daily as needed for Cough      Sodium Phosphates (FLEET) Place 1 enema rectally daily as needed.  Indications: are normal. She exhibits no distension and no mass. There is no abdominal tenderness. There is no rebound and no guarding. Lymphadenopathy:     She has no cervical adenopathy. Skin: Skin is warm. No rash noted. She is not diaphoretic. No erythema. No pallor. Blood pressure 127/68, pulse 79, temperature 98 °F (36.7 °C), weight 168 lb (76.2 kg), SpO2 97 %.       .   Lab Results   Component Value Date    WBC 6.8 11/04/2020    HGB 14.5 11/04/2020    HCT 42.7 11/04/2020    MCV 98.8 11/04/2020     (L) 11/04/2020     Lab Results   Component Value Date     11/04/2020    K 3.8 11/04/2020    K 4.1 12/10/2018    CL 97 11/04/2020    CO2 25 11/04/2020    BUN 14 11/04/2020    CREATININE 0.54 11/04/2020    GLUCOSE 156 11/04/2020    GLUCOSE 85 03/13/2012    CALCIUM 10.0 11/04/2020        Chest x-ray is clear        ASSESSMENT:  Patient Active Problem List   Diagnosis    MR (mental retardation)    [facility]    Legally blind    Chronic rhinitis    Mood disorder ([facility]    Vitamin D deficiency    Full code status    Dietary restriction    Dyslipidemia    Periodontal disease    Mental retardation    Drug-induced thrombocytopenia    Leucopenia    Aphakia, left eye    Hyphema, left eye    Phthisis bulbi of right eye    Pneumonia    Pneumonia due to COVID-19 virus         PLAN:    COVID-19 infection   on supplemental oxygen placed on Decadron     will add remdesivir

## 2020-11-05 NOTE — PROGRESS NOTES
auscultation bilaterally, no wheezing or rhonchi  Cardio: RRR. No murmur  Abd: Obese abdomen. Soft, nondistended. NTTP. BS present  Ext: No erythema or edema.  LE NTTP        Labs:   Recent Labs     11/04/20  1759   WBC 6.8   HGB 14.5   HCT 42.7   *     Recent Labs     11/04/20  1759      K 3.8   CL 97   CO2 25   BUN 14   CREATININE 0.54   CALCIUM 10.0*     Recent Labs     11/04/20  1759   AST 35   ALT 21   BILITOT 0.4   ALKPHOS 74     Recent Labs     11/05/20  0648   INR 0.9     Recent Labs     11/05/20  0648   TROPONINI <0.010       Urinalysis:      Lab Results   Component Value Date    NITRU Negative 11/04/2020    WBCUA 3-5 11/04/2020    BACTERIA Negative 11/04/2020    RBCUA 0-2 11/04/2020    BLOODU Trace-intact 11/04/2020    SPECGRAV 1.025 11/04/2020    GLUCOSEU Negative 11/04/2020    GLUCOSEU NEG 03/02/2012       Radiology:  US DUP LOWER EXTREMITIES BILATERAL VENOUS    (Results Pending)           Assessment/Plan:    Active Hospital Problems    Diagnosis Date Noted    Pneumonia due to COVID-19 virus [U07.1, J12.89] 11/04/2020    Drug-induced thrombocytopenia [D69.59, T50.905A] 12/07/2017    Dyslipidemia [E78.5]     Seizure disorder ([facility] [G40.909]     MR (mental retardation) [F79]     Legally blind [H54.8]     Mood disorder ([facility] [F39]      Viral pneumonia due to COVID-19/acute hypoxic respiratory failure  -Patient has been started on Decadron therapy  -Patient required 3 L O2 nasal cannula on admission, this is being weaned down  -D-dimer is elevated, lower extremity ultrasound is pending  -Trend D-dimer    Seizure disorder  -Continue home Depakote    Mood disorder, intellectual disability: Continue home medications    Anticoagulation with Lovenox    Diet: DIET GENERAL;    Code Status: Full Code              Electronically signed by Jer Han DO on 11/5/2020 at 3:55 PM

## 2020-11-05 NOTE — PROGRESS NOTES
or Chronic w/ Exacerbation  []     Surgical Status No [x]   Surgeries     General []   Surgery Lower []   Abdominal Thoracic or []   Upper Abdominal Thoracic with  PulmonaryDisorder  []     Chest X-ray Clear/Not  Ordered     [x]  Chronic Changes  Results Pending  []  Infiltrates, atelectasis, pleural effusion, or edema  []  Infiltrates in more than one lobe []  Infiltrate + Atelectasis, &/or pleural effusion  []    Respiratory Pattern Regular,  RR = 12-20 [x]  Increased,  RR = 21-25 []  ACE, irregular,  or RR = 26-30 []  Decreased FEV1  or RR = 31-35 []  Severe SOB, use  of accessory muscles, or RR ? 35  []    Mental Status Alert, oriented,  Cooperative [x]  Confused but Follows commands []  Lethargic or unable to follow commands []  Obtunded  []  Comatose  []    Breath Sounds Clear to  auscultation  []  Decreased unilaterally or  in bases only []  Decreased  bilaterally  [x]  Crackles or intermittent wheezes []  Wheezes []    Cough Strong, Spontan., & nonproductive [x]  Strong,  spontaneous, &  productive []  Weak,  Nonproductive []  Weak, productive or  with wheezes []  No spontaneous  cough or may require suctioning []    Level of Activity Ambulatory []  Ambulatory w/ Assist  [x]  Non-ambulatory []  Paraplegic []  Quadriplegic []    Total    Score:____3___     Triage Score:_____5___      Tri       Triage:     1. (>20) Freq: Q3    2. (16-20) Freq: Q4   3. (11-15) Freq: QID & Albuterol Q2 PRN    4. (6-10) Freq: TID & Albuterol Q2 PRN    5. (0-5) Freq Q4prn

## 2020-11-05 NOTE — CARE COORDINATION
Pt is from a [address] [place] [address] Road home. LSW attempted phone call to David Vargas RN at [facility] and left a message. Phone call made to Enzo Aggarwal, Guardian for pt. Cristiane Prado is not related and works for the [employer].  She was not completely sure of patient's prior level of function since she has not been able to see her since the start of the pandemic. She referred back to Vickie Perdomo, [facility] did discuss with her the possibility that a decision about a SNF may have to be made. Cristiane Prado stated she had also started conversation with [facility] about their plan and the possibility for a designated shepherd at their facility. Will need to follow for POC. Phone conversation with Vickie Perdomo RN at [facility]. She shared that pt was previously walking without a device and does not have home 02. When pt does walk, she has another person with her since she is blind. Pt is only able to follow a few simple commands. [facility] does have a wheelchair available if needed and could accommodate 02 if needed. [facility] would prefer for pt to return to them if possible. They may have to do some room changes as they only have a couple private rooms and 8 residents. They also are currently testing the whole home for COVID as they have another resident showing signs.

## 2020-11-05 NOTE — ACP (ADVANCE CARE PLANNING)
Preferences    Ventilation: \"If you were in your present state of health and suddenly became very ill and were unable to breathe on your own, what would your preference be about the use of a ventilator (breathing machine) if it were available to you? \"      Would the patient desire the use of ventilator (breathing machine)?: no    \"If your health worsens and it becomes clear that your chance of recovery is unlikely, what would your preference be about the use of a ventilator (breathing machine) if it were available to you? \"     Would the patient desire the use of ventilator (breathing machine)?: No      Resuscitation  \"CPR works best to restart the heart when there is a sudden event, like a heart attack, in someone who is otherwise healthy. Unfortunately, CPR does not typically restart the heart for people who have serious health conditions or who are very sick. \"    \"In the event your heart stopped as a result of an underlying serious health condition, would you want attempts to be made to restart your heart (answer \"yes\" for attempt to resuscitate) or would you prefer a natural death (answer \"no\" for do not attempt to resuscitate)? \" yes      NOTE: If the patient has a valid advance directive AND now provides care preference(s) that are inconsistent with that prior directive, advise the patient to consider either: creating a new advance directive that complies with state-specific requirements; or, if that is not possible, orally revoking that prior directive in accordance with state-specific requirements, which must be documented in the EHR. [] Yes   [] No   Educated Patient / Ramona Blank regarding differences between Advance Directives and portable DNR orders.     Length of ACP Conversation in minutes:      Conversation Outcomes:  [x] ACP discussion completed  [] Existing advance directive reviewed with patient; no changes to patient's previously recorded wishes  [] New Advance Directive completed  []

## 2020-11-05 NOTE — PLAN OF CARE
Problem: Airway Clearance - Ineffective  Goal: Achieve or maintain patent airway  Outcome: Ongoing     Problem: Gas Exchange - Impaired  Goal: Absence of hypoxia  Outcome: Ongoing     Problem: Breathing Pattern - Ineffective  Goal: Ability to achieve and maintain a regular respiratory rate  Outcome: Ongoing     Problem:  Body Temperature -  Risk of, Imbalanced  Goal: Ability to maintain a body temperature within defined limits  Outcome: Ongoing

## 2020-11-05 NOTE — PROGRESS NOTES
Order for Remdesivir received from Infectious Disease, Dr. Kim Hopper    1)  Confirmed drug availability for complete patient course of therapy (200 mg IV x 1, then 100 mg daily on days 2-5)    2)  Reviewed/Confirmed patient meets Inclusion Criteria for use as follows:  I. Adults, children (?3.5Kg, only use lyophilized powder), or pregnant women with proven COVID-19 as defined by a positive nasopharyngeal swab, tracheal aspirate or sputum SARS-CoV-2 PCR or a positive serology test requiring hospitalization for COVID-19-severe pneumonia. II. Severe disease defined as having one of the following prior to start of RDV:   Requiring invasive or non-invasive mechanical ventilation (start RDV within 48 hours of intubation)*   Requiring supplemental oxygen** YES   An SpO2 ? 94% on room air^^ YES   Tachypnea (respiratory rate persistently ? 24 breaths per minute) YES     COVID-19: 11/4/2020 by COVID-19 (Collected 11/04/20)    3)  Recommend prioritization of patients who present with symptom onset < 14 days and within 10 days of acute care admission     4)  Reviewed/Confirmed none of the following Exclusion Criteria present: (criteria in bold present during review; risk/benefit rationale of physician documented)  I. Mechanically ventilated ? 48 hours prior to start of remdesivir   II. Use of more than 1 vasopressor agent prior to start of remdesivir   III. Already improving on current treatment/supportive regimen as evidenced by improving oxygenation, and/or impending discharge   IV.  Patients in whom the clinical team think death is in the immediate short-term whereby administration of RDV unlikely to change clinical outcome     5) Recommend against initiating in patients with GFR < 30mL/min or ALT >/= 5 times ULN at baseline     Recent Labs     11/04/20  1759   CREATININE 0.54        Recent Labs     11/04/20  1759   ALT 21   AST 35       6) Monitoring Parameters:  CMP (includes hepatic panel) and CBC daily x 5 days -

## 2020-11-06 LAB
ALBUMIN SERPL-MCNC: 3.6 G/DL (ref 3.5–4.6)
ALP BLD-CCNC: 53 U/L (ref 40–130)
ALT SERPL-CCNC: 19 U/L (ref 0–33)
ANION GAP SERPL CALCULATED.3IONS-SCNC: 11 MEQ/L (ref 9–15)
APTT: 35.9 SEC (ref 24.4–36.8)
AST SERPL-CCNC: 32 U/L (ref 0–35)
BASOPHILS ABSOLUTE: 0 K/UL (ref 0–0.2)
BASOPHILS RELATIVE PERCENT: 0.2 %
BILIRUB SERPL-MCNC: <0.2 MG/DL (ref 0.2–0.7)
BUN BLDV-MCNC: 15 MG/DL (ref 8–23)
CALCIUM SERPL-MCNC: 8.4 MG/DL (ref 8.5–9.9)
CHLORIDE BLD-SCNC: 104 MEQ/L (ref 95–107)
CO2: 28 MEQ/L (ref 20–31)
CREAT SERPL-MCNC: 0.51 MG/DL (ref 0.5–0.9)
D DIMER: 0.5 MG/L FEU (ref 0–0.5)
EOSINOPHILS ABSOLUTE: 0 K/UL (ref 0–0.7)
EOSINOPHILS RELATIVE PERCENT: 0 %
FIBRINOGEN: 264 MG/DL (ref 235–507)
GFR AFRICAN AMERICAN: >60
GFR NON-AFRICAN AMERICAN: >60
GLOBULIN: 2.4 G/DL (ref 2.3–3.5)
GLUCOSE BLD-MCNC: 110 MG/DL (ref 70–99)
HCT VFR BLD CALC: 36.6 % (ref 37–47)
HEMOGLOBIN: 12.5 G/DL (ref 12–16)
INR BLD: 0.9
LYMPHOCYTES ABSOLUTE: 1.7 K/UL (ref 1–4.8)
LYMPHOCYTES RELATIVE PERCENT: 23.8 %
MCH RBC QN AUTO: 33.3 PG (ref 27–31.3)
MCHC RBC AUTO-ENTMCNC: 34 % (ref 33–37)
MCV RBC AUTO: 97.9 FL (ref 82–100)
MONOCYTES ABSOLUTE: 1 K/UL (ref 0.2–0.8)
MONOCYTES RELATIVE PERCENT: 14 %
NEUTROPHILS ABSOLUTE: 4.3 K/UL (ref 1.4–6.5)
NEUTROPHILS RELATIVE PERCENT: 62 %
PDW BLD-RTO: 12.9 % (ref 11.5–14.5)
PLATELET # BLD: 91 K/UL (ref 130–400)
PLATELET SLIDE REVIEW: ABNORMAL
POTASSIUM REFLEX MAGNESIUM: 4 MEQ/L (ref 3.4–4.9)
PROTHROMBIN TIME: 12.3 SEC (ref 12.3–14.9)
RBC # BLD: 3.74 M/UL (ref 4.2–5.4)
SODIUM BLD-SCNC: 143 MEQ/L (ref 135–144)
TOTAL PROTEIN: 6 G/DL (ref 6.3–8)
WBC # BLD: 7 K/UL (ref 4.8–10.8)

## 2020-11-06 PROCEDURE — 2700000000 HC OXYGEN THERAPY PER DAY

## 2020-11-06 PROCEDURE — XW033E5 INTRODUCTION OF REMDESIVIR ANTI-INFECTIVE INTO PERIPHERAL VEIN, PERCUTANEOUS APPROACH, NEW TECHNOLOGY GROUP 5: ICD-10-PCS | Performed by: INTERNAL MEDICINE

## 2020-11-06 PROCEDURE — 6360000002 HC RX W HCPCS: Performed by: INTERNAL MEDICINE

## 2020-11-06 PROCEDURE — 85379 FIBRIN DEGRADATION QUANT: CPT

## 2020-11-06 PROCEDURE — 36415 COLL VENOUS BLD VENIPUNCTURE: CPT

## 2020-11-06 PROCEDURE — 6370000000 HC RX 637 (ALT 250 FOR IP): Performed by: INTERNAL MEDICINE

## 2020-11-06 PROCEDURE — 85730 THROMBOPLASTIN TIME PARTIAL: CPT

## 2020-11-06 PROCEDURE — 2580000003 HC RX 258: Performed by: INTERNAL MEDICINE

## 2020-11-06 PROCEDURE — 85610 PROTHROMBIN TIME: CPT

## 2020-11-06 PROCEDURE — 85384 FIBRINOGEN ACTIVITY: CPT

## 2020-11-06 PROCEDURE — 6360000002 HC RX W HCPCS: Performed by: NURSE PRACTITIONER

## 2020-11-06 PROCEDURE — 99232 SBSQ HOSP IP/OBS MODERATE 35: CPT | Performed by: INTERNAL MEDICINE

## 2020-11-06 PROCEDURE — 85025 COMPLETE CBC W/AUTO DIFF WBC: CPT

## 2020-11-06 PROCEDURE — 1210000000 HC MED SURG R&B

## 2020-11-06 PROCEDURE — 2580000003 HC RX 258: Performed by: NURSE PRACTITIONER

## 2020-11-06 PROCEDURE — 80053 COMPREHEN METABOLIC PANEL: CPT

## 2020-11-06 RX ADMIN — DEXAMETHASONE 6 MG: 4 TABLET ORAL at 10:45

## 2020-11-06 RX ADMIN — LACTULOSE 10 G: 20 SOLUTION ORAL at 10:46

## 2020-11-06 RX ADMIN — REMDESIVIR 100 MG: 5 INJECTION INTRAVENOUS at 22:37

## 2020-11-06 RX ADMIN — ASPIRIN 81 MG CHEWABLE TABLET 81 MG: 81 TABLET CHEWABLE at 10:44

## 2020-11-06 RX ADMIN — CLONAZEPAM 1 MG: 1 TABLET ORAL at 22:38

## 2020-11-06 RX ADMIN — NIACIN 500 MG: 500 TABLET, EXTENDED RELEASE ORAL at 10:44

## 2020-11-06 RX ADMIN — DIVALPROEX SODIUM 500 MG: 500 TABLET, DELAYED RELEASE ORAL at 10:45

## 2020-11-06 RX ADMIN — Medication 10 ML: at 22:38

## 2020-11-06 RX ADMIN — BUSPIRONE HYDROCHLORIDE 30 MG: 10 TABLET ORAL at 22:37

## 2020-11-06 RX ADMIN — QUETIAPINE FUMARATE 100 MG: 50 TABLET ORAL at 22:38

## 2020-11-06 RX ADMIN — CLONAZEPAM 1 MG: 1 TABLET ORAL at 10:45

## 2020-11-06 RX ADMIN — CLONAZEPAM 1 MG: 1 TABLET ORAL at 16:16

## 2020-11-06 RX ADMIN — ENOXAPARIN SODIUM 30 MG: 30 INJECTION SUBCUTANEOUS at 10:44

## 2020-11-06 RX ADMIN — QUETIAPINE FUMARATE 100 MG: 50 TABLET ORAL at 16:16

## 2020-11-06 RX ADMIN — QUETIAPINE FUMARATE 100 MG: 50 TABLET ORAL at 10:44

## 2020-11-06 RX ADMIN — DOCUSATE SODIUM 100 MG: 100 CAPSULE ORAL at 10:44

## 2020-11-06 RX ADMIN — SIMVASTATIN 20 MG: 20 TABLET, FILM COATED ORAL at 22:38

## 2020-11-06 RX ADMIN — DIVALPROEX SODIUM 500 MG: 500 TABLET, DELAYED RELEASE ORAL at 22:37

## 2020-11-06 RX ADMIN — BUSPIRONE HYDROCHLORIDE 30 MG: 10 TABLET ORAL at 10:45

## 2020-11-06 ASSESSMENT — PAIN SCALES - GENERAL: PAINLEVEL_OUTOF10: 0

## 2020-11-06 ASSESSMENT — PAIN SCALES - WONG BAKER: WONGBAKER_NUMERICALRESPONSE: 0

## 2020-11-06 NOTE — PROGRESS NOTES
Infectious Disease     Patient Name: Ludwig Malloy  Date: 11/6/2020  YOB: 1940  Medical Record Number: [id number]        COVID-19 infection      History of Present Illness:  Mentally retarded legally blind disorder      Patient presents emergency department from group home thought to be running a fever saturations were decreasing  Presented to [facility] shortness of breath cough no fevers no obvious distress  tested positive for COVID-19  Temperature was 100.6 °F  97% SpO2 with 2 L nasal cannula oxygen       D-Dimer, Quantitative [[id number]] (Abnormal)  Collected: 11/05/20 0648       Specimen: Blood  Updated: 11/05/20 0820        D-Dimer, Quant  0.54  mg/L FEU        Narrative:         Dayan Davis [phone],   DIMER results called to and read back by Navin Thomas, 11/05/2020 08:20, by   [facility]       Lactic Acid, Plasma [[id number]] (Abnormal)  Collected: 11/05/20 0648       Specimen: Blood  Updated: 11/05/20 0756        Lactic Acid  3.0  mmol/L        Narrative:         CALL  [facility]  [facility]W tel. [phone],   Lactic acid results called to and read back by Jacquelin Blevins, 11/05/2020 07:56,   by Louretta Dose       Procalcitonin [[id number]]  Collected: 11/05/20 0648       Specimen: Blood  Updated: 11/05/20 0740        Procalcitonin  0.04  ng/mL        Troponin [[id number]]  Collected: 11/05/20 0648       Specimen: Blood  Updated: 11/05/20 0740        Troponin  <0.010  ng/mL        C-Reactive Protein [[id number]]  Collected: 11/05/20 0648       Specimen: Blood  Updated: 11/05/20 0738        CRP  4.8  mg/L        Lactate Dehydrogenase [[id number]]  Collected: 11/05/20 0648       Specimen: Blood  Updated: 11/05/20 0738        LD  194  U/L        APTT [[id number]]  Collected: 11/05/20 0648       Specimen: Blood  Updated: 11/05/20 0722        aPTT  36.4  sec        Fibrinogen [[id number]]  Collected: 11/05/20 0648       Specimen: Blood  Updated: 11/05/20 0722        Fibrinogen  305.0  mg/dL

## 2020-11-06 NOTE — FLOWSHEET NOTE
Pt assisted to the bathroom. Remdesivir administered. Duplex of BLE completed after x1 dose of IV Ativan. Pt took PM meds.

## 2020-11-06 NOTE — PROGRESS NOTES
extremity        Labs:   Recent Labs     11/04/20  1759 11/06/20  0624   WBC 6.8 7.0   HGB 14.5 12.5   HCT 42.7 36.6*   * 91*     Recent Labs     11/04/20  1759 11/06/20  0624    143   K 3.8 4.0   CL 97 104   CO2 25 28   BUN 14 15   CREATININE 0.54 0.51   CALCIUM 10.0* 8.4*     Recent Labs     11/04/20  1759 11/06/20  0624   AST 35 32   ALT 21 19   BILITOT 0.4 <0.2   ALKPHOS 74 53     Recent Labs     11/05/20  0648 11/06/20  0624   INR 0.9 0.9     Recent Labs     11/05/20  0648   TROPONINI <0.010       Urinalysis:      Lab Results   Component Value Date    NITRU Negative 11/04/2020    WBCUA 3-5 11/04/2020    BACTERIA Negative 11/04/2020    RBCUA 0-2 11/04/2020    BLOODU Trace-intact 11/04/2020    SPECGRAV 1.025 11/04/2020    GLUCOSEU Negative 11/04/2020    GLUCOSEU NEG 03/02/2012       Radiology:  US DUP LOWER EXTREMITIES BILATERAL VENOUS   Final Result      NO DVT IDENTIFIED IN EITHER LOWER EXTREMITY. Assessment/Plan:    Active Hospital Problems    Diagnosis Date Noted    Pneumonia due to COVID-19 virus [U07.1, J12.89] 11/04/2020    Drug-induced thrombocytopenia [D69.59, T50.905A] 12/07/2017    Dyslipidemia [E78.5]     [facility] [G40.909]     MR (mental retardation) [F79]     Legally blind [H54.8]     Mood disorder ([facility] [F39]      Viral pneumonia due to COVID-19/acute hypoxic respiratory failure  -Patient has been started on Decadron therapy, remdesivir (complete 11/10)  -Hypoxia is improved  -D-dimer is elevated, lower extremity ultrasound negative for DVT  -Trend D-dimer    Seizure disorder  -Continue home Depakote    Mood disorder, intellectual disability: Continue home medications    DVT prophylaxis with Lovenox    Diet: Dietary Nutrition Supplements: Protein Modular  DIET GENERAL; Dysphagia Soft and Bite-Sized;  Safety Tray; Safety Tray (Disposables)    Code Status: Full Code              Electronically signed by Mona Ashley DO on 11/6/2020 at 10:49 AM

## 2020-11-07 LAB
APTT: 33.1 SEC (ref 24.4–36.8)
D DIMER: 0.43 MG/L FEU (ref 0–0.5)
FIBRINOGEN: 262 MG/DL (ref 235–507)
INR BLD: 0.9
L. PNEUMOPHILA SEROGP 1 UR AG: NEGATIVE
PROTHROMBIN TIME: 12.5 SEC (ref 12.3–14.9)

## 2020-11-07 PROCEDURE — 85610 PROTHROMBIN TIME: CPT

## 2020-11-07 PROCEDURE — 6370000000 HC RX 637 (ALT 250 FOR IP): Performed by: INTERNAL MEDICINE

## 2020-11-07 PROCEDURE — 85730 THROMBOPLASTIN TIME PARTIAL: CPT

## 2020-11-07 PROCEDURE — 85379 FIBRIN DEGRADATION QUANT: CPT

## 2020-11-07 PROCEDURE — 6360000002 HC RX W HCPCS: Performed by: NURSE PRACTITIONER

## 2020-11-07 PROCEDURE — 85384 FIBRINOGEN ACTIVITY: CPT

## 2020-11-07 PROCEDURE — 2580000003 HC RX 258: Performed by: INTERNAL MEDICINE

## 2020-11-07 PROCEDURE — 99232 SBSQ HOSP IP/OBS MODERATE 35: CPT | Performed by: INTERNAL MEDICINE

## 2020-11-07 PROCEDURE — 2580000003 HC RX 258: Performed by: NURSE PRACTITIONER

## 2020-11-07 PROCEDURE — 2500000003 HC RX 250 WO HCPCS: Performed by: INTERNAL MEDICINE

## 2020-11-07 PROCEDURE — 1210000000 HC MED SURG R&B

## 2020-11-07 PROCEDURE — 36415 COLL VENOUS BLD VENIPUNCTURE: CPT

## 2020-11-07 RX ADMIN — ASPIRIN 81 MG CHEWABLE TABLET 81 MG: 81 TABLET CHEWABLE at 10:15

## 2020-11-07 RX ADMIN — LACTULOSE 10 G: 20 SOLUTION ORAL at 10:14

## 2020-11-07 RX ADMIN — SIMVASTATIN 20 MG: 20 TABLET, FILM COATED ORAL at 22:53

## 2020-11-07 RX ADMIN — QUETIAPINE FUMARATE 100 MG: 50 TABLET ORAL at 14:43

## 2020-11-07 RX ADMIN — CLONAZEPAM 1 MG: 1 TABLET ORAL at 22:53

## 2020-11-07 RX ADMIN — QUETIAPINE FUMARATE 100 MG: 50 TABLET ORAL at 10:15

## 2020-11-07 RX ADMIN — CLONAZEPAM 1 MG: 1 TABLET ORAL at 14:43

## 2020-11-07 RX ADMIN — Medication 10 ML: at 10:15

## 2020-11-07 RX ADMIN — DIVALPROEX SODIUM 500 MG: 500 TABLET, DELAYED RELEASE ORAL at 10:14

## 2020-11-07 RX ADMIN — Medication 10 ML: at 22:54

## 2020-11-07 RX ADMIN — QUETIAPINE FUMARATE 100 MG: 50 TABLET ORAL at 22:53

## 2020-11-07 RX ADMIN — REMDESIVIR 100 MG: 5 INJECTION INTRAVENOUS at 12:15

## 2020-11-07 RX ADMIN — NIACIN 500 MG: 500 TABLET, EXTENDED RELEASE ORAL at 10:14

## 2020-11-07 RX ADMIN — DEXAMETHASONE 6 MG: 4 TABLET ORAL at 10:14

## 2020-11-07 RX ADMIN — BUSPIRONE HYDROCHLORIDE 30 MG: 10 TABLET ORAL at 10:14

## 2020-11-07 RX ADMIN — CLONAZEPAM 1 MG: 1 TABLET ORAL at 10:14

## 2020-11-07 RX ADMIN — DIVALPROEX SODIUM 500 MG: 500 TABLET, DELAYED RELEASE ORAL at 22:53

## 2020-11-07 RX ADMIN — DOCUSATE SODIUM 100 MG: 100 CAPSULE ORAL at 10:15

## 2020-11-07 NOTE — PROGRESS NOTES
Infectious Disease     Patient Name: Osmel Burgos  Date: 11/7/2020  YOB: 1940  Medical Record Number: [id number]      History of Present Illness: Follow-up COVID-19 pneumonia with hypoxia, on p.o. remdesivir day 3 that is well-tolerated with resolved hypoxia, currently on room air. Hypercoagulable status on Decadron and Lovenox  Unable to provide review of systems secondary to Mentally retartion legally blind disorder  Patient has problems with voiding. She voided 1 L yesterday at 6 PM and has not had any urine output since then  Has poor appetite and is restless and agitated at times  Has occasional cough  No diarrhea  Resolved fevers     Physical Exam   Constitutional: No distress. Eyes: No scleral icterus. Neck: Neck supple. No JVD present. Cardiovascular: Normal rate, regular rhythm and normal heart sounds. No murmur heard. Pulmonary/Chest: Effort normal and breath sounds normal. No respiratory distress. She has no wheezes. She has no rales. She exhibits no tenderness. Abdominal: Soft. Bowel sounds are normal. She exhibits distension (bladder). There is no guarding. Musculoskeletal:         General: No edema. Neurological: She is alert. Skin: No rash noted. No erythema. Nursing note and vitals reviewed. non verbal, not follow commands, restless and walking around with assistance  Vitals:    11/05/20 1515 11/05/20 2154 11/06/20 0830 11/06/20 2219   BP:  133/63 136/71 (!) 145/56   Pulse:  84  78   Resp:    18   Temp:  98 °F (36.7 °C) 97.4 °F (36.3 °C) 97.6 °F (36.4 °C)   TempSrc:   Axillary Axillary   SpO2:  97% 94%    Weight:       Height: 5' 5\" (1.651 m)         on room air  .    Lab Results   Component Value Date    WBC 7.0 11/06/2020    HGB 12.5 11/06/2020    HCT 36.6 (L) 11/06/2020    MCV 97.9 11/06/2020    PLT 91 (L) 11/06/2020     Lab Results   Component Value Date     11/06/2020    K 4.0 11/06/2020     11/06/2020    CO2 28 11/06/2020    BUN 15 11/06/2020 CREATININE 0.51 11/06/2020    GLUCOSE 110 11/06/2020    GLUCOSE 85 03/13/2012    CALCIUM 8.4 11/06/2020      Assessment:  COVID-19 pneumonia with hypoxia, improving  Hypercoagulable status  Acute urine retention  MRDD  Plan:  Continue remdesivir for a total of 5 days  Follow-up CBC complete metabolic profile while on remdesivir  Agree with Decadron and Lovenox  Bladder scan rule out urinary retention    Araseli Green MD

## 2020-11-07 NOTE — PROGRESS NOTES
Hospitalist Progress Note      PCP: Abby Benitez MD    Date of Admission: 11/5/2020    Chief Complaint:      Subjective: :  No acute events overnight per nursing staff. Medications:  Reviewed    Infusion Medications   Scheduled Medications    [START ON 11/9/2020] influenza virus vaccine  0.5 mL Intramuscular Prior to discharge    dexamethasone  6 mg Oral Daily    sodium chloride flush  10 mL Intravenous 2 times per day    aspirin  81 mg Oral Daily    busPIRone  30 mg Oral BID    clonazePAM  1 mg Oral TID    divalproex  500 mg Oral BID    docusate sodium  100 mg Oral Daily    hydrocortisone   Topical BID    lactulose  10 g Oral Daily    niacin  500 mg Oral Daily    QUEtiapine  100 mg Oral TID    simvastatin  20 mg Oral Nightly    enoxaparin  30 mg Subcutaneous BID    remdesivir IVPB  100 mg Intravenous Q24H     PRN Meds: acetaminophen **OR** acetaminophen, guaiFENesin-dextromethorphan, sodium chloride flush, polyethylene glycol, promethazine **OR** ondansetron, albuterol sulfate HFA, acetaminophen, albuterol, magnesium hydroxide, guaiFENesin, sodium chloride      Intake/Output Summary (Last 24 hours) at 11/7/2020 1144  Last data filed at 11/7/2020 1110  Gross per 24 hour   Intake 460 ml   Output 1000 ml   Net -540 ml       Exam:    BP (!) 145/56   Pulse 78   Temp 97.6 °F (36.4 °C) (Axillary)   Resp 18   Ht 5' 5\" (1.651 m)   Wt 168 lb (76.2 kg)   SpO2 92%   BMI 27.96 kg/m²     GEN: Patient is resting and appears comfortable. ID, follows command to squeeze hand. HEENT: Atraumatic. Patient is legally blind. Neck is supple  Resp: Clear to auscultation bilaterally, no wheezing or rhonchi  Cardio: RRR. No murmur  Abd: Obese abdomen. Soft, nondistended. NTTP. BS present  Ext: No erythema or edema.   No grimace with palpation of the lower extremity        Labs:   Recent Labs     11/04/20  1759 11/06/20  0624   WBC 6.8 7.0   HGB 14.5 12.5   HCT 42.7 36.6*   * 91*     Recent Labs 11/04/20  1759 11/06/20  0624    143   K 3.8 4.0   CL 97 104   CO2 25 28   BUN 14 15   CREATININE 0.54 0.51   CALCIUM 10.0* 8.4*     Recent Labs     11/04/20  1759 11/06/20  0624   AST 35 32   ALT 21 19   BILITOT 0.4 <0.2   ALKPHOS 74 53     Recent Labs     11/05/20  0648 11/06/20  0624 11/07/20  0831   INR 0.9 0.9 0.9     Recent Labs     11/05/20  0648   TROPONINI <0.010       Urinalysis:      Lab Results   Component Value Date    NITRU Negative 11/04/2020    WBCUA 3-5 11/04/2020    BACTERIA Negative 11/04/2020    RBCUA 0-2 11/04/2020    BLOODU Trace-intact 11/04/2020    SPECGRAV 1.025 11/04/2020    GLUCOSEU Negative 11/04/2020    GLUCOSEU NEG 03/02/2012       Radiology:  US DUP LOWER EXTREMITIES BILATERAL VENOUS   Final Result      NO DVT IDENTIFIED IN EITHER LOWER EXTREMITY. Assessment/Plan:    Active Hospital Problems    Diagnosis Date Noted    Pneumonia due to COVID-19 virus [U07.1, J12.89] 11/04/2020    Drug-induced thrombocytopenia [D69.59, T50.905A] 12/07/2017    Dyslipidemia [E78.5]     Seizure disorder ([facility] [G40.909]     MR (mental retardation) [F79]     Legally blind [H54.8]     Mood disorder ([facility] [F39]      Viral pneumonia due to COVID-19/acute hypoxic respiratory failure  -Patient has been started on Decadron therapy, remdesivir (complete 11/10)  -Hypoxia is improved  -D-dimer is elevated, lower extremity ultrasound negative for DVT  -Trend D-dimer  -ID consulted    Seizure disorder  -Continue home Depakote    Mood disorder, intellectual disability: Continue home medications    DVT prophylaxis with Lovenox    Diet: Dietary Nutrition Supplements: Protein Modular  DIET GENERAL; Dysphagia Soft and Bite-Sized;  Safety Tray; Safety Tray (Disposables)    Code Status: Full Code              Electronically signed by Juan Wallace DO on 11/7/2020 at 11:44 AM

## 2020-11-08 LAB
ALBUMIN SERPL-MCNC: 3.5 G/DL (ref 3.5–4.6)
ALP BLD-CCNC: 56 U/L (ref 40–130)
ALT SERPL-CCNC: 38 U/L (ref 0–33)
ANION GAP SERPL CALCULATED.3IONS-SCNC: 13 MEQ/L (ref 9–15)
APTT: 33 SEC (ref 24.4–36.8)
AST SERPL-CCNC: 43 U/L (ref 0–35)
BILIRUB SERPL-MCNC: 0.3 MG/DL (ref 0.2–0.7)
BUN BLDV-MCNC: 14 MG/DL (ref 8–23)
CALCIUM SERPL-MCNC: 8.7 MG/DL (ref 8.5–9.9)
CHLORIDE BLD-SCNC: 99 MEQ/L (ref 95–107)
CO2: 25 MEQ/L (ref 20–31)
CREAT SERPL-MCNC: 0.48 MG/DL (ref 0.5–0.9)
D DIMER: 0.59 MG/L FEU (ref 0–0.5)
FIBRINOGEN: 249 MG/DL (ref 235–507)
GFR AFRICAN AMERICAN: >60
GFR NON-AFRICAN AMERICAN: >60
GLOBULIN: 2.4 G/DL (ref 2.3–3.5)
GLUCOSE BLD-MCNC: 100 MG/DL (ref 70–99)
HCT VFR BLD CALC: 37.6 % (ref 37–47)
HEMOGLOBIN: 12.8 G/DL (ref 12–16)
INR BLD: 1
MCH RBC QN AUTO: 33.2 PG (ref 27–31.3)
MCHC RBC AUTO-ENTMCNC: 33.9 % (ref 33–37)
MCV RBC AUTO: 98 FL (ref 82–100)
PDW BLD-RTO: 12.7 % (ref 11.5–14.5)
PLATELET # BLD: 108 K/UL (ref 130–400)
POTASSIUM SERPL-SCNC: 4.4 MEQ/L (ref 3.4–4.9)
PROTHROMBIN TIME: 12.8 SEC (ref 12.3–14.9)
RBC # BLD: 3.84 M/UL (ref 4.2–5.4)
SODIUM BLD-SCNC: 137 MEQ/L (ref 135–144)
TOTAL PROTEIN: 5.9 G/DL (ref 6.3–8)
WBC # BLD: 5.6 K/UL (ref 4.8–10.8)

## 2020-11-08 PROCEDURE — 2500000003 HC RX 250 WO HCPCS: Performed by: INTERNAL MEDICINE

## 2020-11-08 PROCEDURE — 36415 COLL VENOUS BLD VENIPUNCTURE: CPT

## 2020-11-08 PROCEDURE — 1210000000 HC MED SURG R&B

## 2020-11-08 PROCEDURE — 85027 COMPLETE CBC AUTOMATED: CPT

## 2020-11-08 PROCEDURE — 6360000002 HC RX W HCPCS: Performed by: INTERNAL MEDICINE

## 2020-11-08 PROCEDURE — 6360000002 HC RX W HCPCS: Performed by: NURSE PRACTITIONER

## 2020-11-08 PROCEDURE — 2580000003 HC RX 258: Performed by: NURSE PRACTITIONER

## 2020-11-08 PROCEDURE — 6370000000 HC RX 637 (ALT 250 FOR IP): Performed by: NURSE PRACTITIONER

## 2020-11-08 PROCEDURE — 6370000000 HC RX 637 (ALT 250 FOR IP): Performed by: INTERNAL MEDICINE

## 2020-11-08 PROCEDURE — 2580000003 HC RX 258: Performed by: INTERNAL MEDICINE

## 2020-11-08 PROCEDURE — 85610 PROTHROMBIN TIME: CPT

## 2020-11-08 PROCEDURE — 80053 COMPREHEN METABOLIC PANEL: CPT

## 2020-11-08 PROCEDURE — 85379 FIBRIN DEGRADATION QUANT: CPT

## 2020-11-08 PROCEDURE — 85384 FIBRINOGEN ACTIVITY: CPT

## 2020-11-08 PROCEDURE — 85730 THROMBOPLASTIN TIME PARTIAL: CPT

## 2020-11-08 RX ADMIN — LACTULOSE 10 G: 20 SOLUTION ORAL at 10:17

## 2020-11-08 RX ADMIN — BUSPIRONE HYDROCHLORIDE 30 MG: 10 TABLET ORAL at 20:30

## 2020-11-08 RX ADMIN — ENOXAPARIN SODIUM 30 MG: 30 INJECTION SUBCUTANEOUS at 10:17

## 2020-11-08 RX ADMIN — Medication 10 ML: at 20:31

## 2020-11-08 RX ADMIN — DIVALPROEX SODIUM 500 MG: 500 TABLET, DELAYED RELEASE ORAL at 10:16

## 2020-11-08 RX ADMIN — CLONAZEPAM 1 MG: 1 TABLET ORAL at 10:16

## 2020-11-08 RX ADMIN — NIACIN 500 MG: 500 TABLET, EXTENDED RELEASE ORAL at 10:16

## 2020-11-08 RX ADMIN — QUETIAPINE FUMARATE 100 MG: 50 TABLET ORAL at 14:25

## 2020-11-08 RX ADMIN — REMDESIVIR 100 MG: 5 INJECTION INTRAVENOUS at 10:17

## 2020-11-08 RX ADMIN — QUETIAPINE FUMARATE 100 MG: 50 TABLET ORAL at 20:30

## 2020-11-08 RX ADMIN — CLONAZEPAM 1 MG: 1 TABLET ORAL at 14:25

## 2020-11-08 RX ADMIN — Medication 10 ML: at 10:17

## 2020-11-08 RX ADMIN — SIMVASTATIN 20 MG: 20 TABLET, FILM COATED ORAL at 20:30

## 2020-11-08 RX ADMIN — ASPIRIN 81 MG CHEWABLE TABLET 81 MG: 81 TABLET CHEWABLE at 10:15

## 2020-11-08 RX ADMIN — QUETIAPINE FUMARATE 100 MG: 50 TABLET ORAL at 10:16

## 2020-11-08 RX ADMIN — CLONAZEPAM 1 MG: 1 TABLET ORAL at 20:30

## 2020-11-08 RX ADMIN — DOCUSATE SODIUM 100 MG: 100 CAPSULE ORAL at 10:16

## 2020-11-08 RX ADMIN — ENOXAPARIN SODIUM 30 MG: 30 INJECTION SUBCUTANEOUS at 20:30

## 2020-11-08 RX ADMIN — BUSPIRONE HYDROCHLORIDE 30 MG: 10 TABLET ORAL at 10:15

## 2020-11-08 RX ADMIN — DEXAMETHASONE 6 MG: 4 TABLET ORAL at 14:25

## 2020-11-08 RX ADMIN — DIVALPROEX SODIUM 500 MG: 500 TABLET, DELAYED RELEASE ORAL at 20:30

## 2020-11-08 NOTE — PROGRESS NOTES
Shift assessment performed vitals stable. Assumed care of patient at 7am this morning. Vitals stable. Pt on RA sat'ing 95% appears comfortable. Offering patient to use bedside commode Q2 hours however pt did not urinate. Pt was bladder scanned which showed 732mL of urine retained in bladder. Patient then voided 1200mL out using bedside commode on next bathroom offer. Patient takes medication whole a few at a time in pudding tolerating well. Placed IV in pt and wrapped it securely. Remdesivir given via IV today at 12pm due to not being given on night shift the previous night. Pharmacy retimed med for tomorrow. Patient is [place], speech incomprehensible, MRDD, and blind. Patient is resting comfortably. Hourly rounding was performed throughout entire shift. Will continue to monitor.      Electronically signed by Caleb Giron RN on 11/7/2020 at 11:16 PM

## 2020-11-08 NOTE — PROGRESS NOTES
Hospitalist Progress Note      PCP: Claudia Blake MD    Date of Admission: 11/5/2020    Chief Complaint:      Subjective: :  Patient seen and examined. She appears comfortable. Discussed with nursing, no acute events overnight. Nursing reports large volume incontinence of urine this morning. Medications:  Reviewed    Infusion Medications   Scheduled Medications    [START ON 11/9/2020] influenza virus vaccine  0.5 mL Intramuscular Prior to discharge    dexamethasone  6 mg Oral Daily    sodium chloride flush  10 mL Intravenous 2 times per day    aspirin  81 mg Oral Daily    busPIRone  30 mg Oral BID    clonazePAM  1 mg Oral TID    divalproex  500 mg Oral BID    docusate sodium  100 mg Oral Daily    hydrocortisone   Topical BID    lactulose  10 g Oral Daily    niacin  500 mg Oral Daily    QUEtiapine  100 mg Oral TID    simvastatin  20 mg Oral Nightly    enoxaparin  30 mg Subcutaneous BID    remdesivir IVPB  100 mg Intravenous Q24H     PRN Meds: acetaminophen **OR** acetaminophen, guaiFENesin-dextromethorphan, sodium chloride flush, polyethylene glycol, promethazine **OR** ondansetron, albuterol sulfate HFA, acetaminophen, albuterol, magnesium hydroxide, guaiFENesin, sodium chloride      Intake/Output Summary (Last 24 hours) at 11/8/2020 1410  Last data filed at 11/7/2020 1840  Gross per 24 hour   Intake 1050 ml   Output 1150 ml   Net -100 ml       Exam:    BP (!) 150/132   Pulse 74   Temp 98.1 °F (36.7 °C) (Axillary)   Resp 18   Ht 5' 5\" (1.651 m)   Wt 168 lb (76.2 kg)   SpO2 96%   BMI 27.96 kg/m²     GEN: Patient is resting and appears comfortable. ID, follows command to squeeze hand. Nonsensical speech  HEENT: Atraumatic. Patient is legally blind. Neck is supple  Resp: Clear to auscultation bilaterally, no wheezing or rhonchi  Cardio: RRR. No murmur  Abd: Obese abdomen. Soft, nondistended. NTTP. BS present  Ext: No erythema or edema.   No grimace with palpation of the lower extremity        Labs:   Recent Labs     11/06/20 0624 11/08/20  0638   WBC 7.0 5.6   HGB 12.5 12.8   HCT 36.6* 37.6   PLT 91* 108*     Recent Labs     11/06/20 0624 11/08/20  0638    137   K 4.0 4.4    99   CO2 28 25   BUN 15 14   CREATININE 0.51 0.48*   CALCIUM 8.4* 8.7     Recent Labs     11/06/20 0624 11/08/20  0638   AST 32 43*   ALT 19 38*   BILITOT <0.2 0.3   ALKPHOS 53 56     Recent Labs     11/06/20  0624 11/07/20  0831 11/08/20  0638   INR 0.9 0.9 1.0     No results for input(s): [place] in the last 72 hours. Urinalysis:      Lab Results   Component Value Date    NITRU Negative 11/04/2020    WBCUA 3-5 11/04/2020    BACTERIA Negative 11/04/2020    RBCUA 0-2 11/04/2020    BLOODU Trace-intact 11/04/2020    SPECGRAV 1.025 11/04/2020    GLUCOSEU Negative 11/04/2020    GLUCOSEU NEG 03/02/2012       Radiology:  US DUP LOWER EXTREMITIES BILATERAL VENOUS   Final Result      NO DVT IDENTIFIED IN EITHER LOWER EXTREMITY. Assessment/Plan:    Active Hospital Problems    Diagnosis Date Noted    Pneumonia due to COVID-19 virus [U07.1, J12.89] 11/04/2020    Drug-induced thrombocytopenia [D69.59, T50.905A] 12/07/2017    Dyslipidemia [E78.5]     [facility] [G40.909]     MR (mental retardation) [F79]     Legally blind [H54.8]     Mood disorder ([facility] [F39]      Viral pneumonia due to COVID-19/acute hypoxic respiratory failure  -Patient has been started on Decadron therapy, remdesivir (complete 11/10)  -Hypoxia is improved  -D-dimer is elevated, lower extremity ultrasound negative for DVT  -Trend D-dimer  -ID consulted    Seizure disorder  -Continue home Depakote    Mood disorder, intellectual disability: Continue home medications    DVT prophylaxis with Lovenox    Diet: Dietary Nutrition Supplements: Protein Modular  DIET GENERAL; Dysphagia Soft and Bite-Sized;  Safety Tray; Safety Tray (Disposables)    Code Status: Full Code              Electronically signed by Claudio Kirkpatrick DO on 11/8/2020 at 2:10 PM

## 2020-11-09 LAB
ALBUMIN SERPL-MCNC: 3.3 G/DL (ref 3.5–4.6)
ALP BLD-CCNC: 53 U/L (ref 40–130)
ALT SERPL-CCNC: 40 U/L (ref 0–33)
ANION GAP SERPL CALCULATED.3IONS-SCNC: 13 MEQ/L (ref 9–15)
APTT: 29.5 SEC (ref 24.4–36.8)
AST SERPL-CCNC: 43 U/L (ref 0–35)
BILIRUB SERPL-MCNC: 0.3 MG/DL (ref 0.2–0.7)
BLOOD CULTURE, ROUTINE: NORMAL
BUN BLDV-MCNC: 16 MG/DL (ref 8–23)
CALCIUM SERPL-MCNC: 8.4 MG/DL (ref 8.5–9.9)
CHLORIDE BLD-SCNC: 99 MEQ/L (ref 95–107)
CO2: 23 MEQ/L (ref 20–31)
CREAT SERPL-MCNC: 0.4 MG/DL (ref 0.5–0.9)
D DIMER: 0.89 MG/L FEU (ref 0–0.5)
FIBRINOGEN: 258 MG/DL (ref 235–507)
GFR AFRICAN AMERICAN: >60
GFR NON-AFRICAN AMERICAN: >60
GLOBULIN: 2.3 G/DL (ref 2.3–3.5)
GLUCOSE BLD-MCNC: 92 MG/DL (ref 70–99)
HCT VFR BLD CALC: 41.4 % (ref 37–47)
HEMOGLOBIN: 13.6 G/DL (ref 12–16)
INR BLD: 1
MCH RBC QN AUTO: 33.1 PG (ref 27–31.3)
MCHC RBC AUTO-ENTMCNC: 32.8 % (ref 33–37)
MCV RBC AUTO: 100.8 FL (ref 82–100)
PDW BLD-RTO: 12.7 % (ref 11.5–14.5)
PLATELET # BLD: 113 K/UL (ref 130–400)
POTASSIUM SERPL-SCNC: 4.1 MEQ/L (ref 3.4–4.9)
PROTHROMBIN TIME: 13.4 SEC (ref 12.3–14.9)
RBC # BLD: 4.11 M/UL (ref 4.2–5.4)
SODIUM BLD-SCNC: 135 MEQ/L (ref 135–144)
TOTAL PROTEIN: 5.6 G/DL (ref 6.3–8)
WBC # BLD: 5.8 K/UL (ref 4.8–10.8)

## 2020-11-09 PROCEDURE — 6360000002 HC RX W HCPCS: Performed by: NURSE PRACTITIONER

## 2020-11-09 PROCEDURE — 2500000003 HC RX 250 WO HCPCS: Performed by: INTERNAL MEDICINE

## 2020-11-09 PROCEDURE — 85027 COMPLETE CBC AUTOMATED: CPT

## 2020-11-09 PROCEDURE — 1210000000 HC MED SURG R&B

## 2020-11-09 PROCEDURE — 85730 THROMBOPLASTIN TIME PARTIAL: CPT

## 2020-11-09 PROCEDURE — 85384 FIBRINOGEN ACTIVITY: CPT

## 2020-11-09 PROCEDURE — 85379 FIBRIN DEGRADATION QUANT: CPT

## 2020-11-09 PROCEDURE — 80053 COMPREHEN METABOLIC PANEL: CPT

## 2020-11-09 PROCEDURE — 2580000003 HC RX 258: Performed by: INTERNAL MEDICINE

## 2020-11-09 PROCEDURE — 85610 PROTHROMBIN TIME: CPT

## 2020-11-09 PROCEDURE — 36415 COLL VENOUS BLD VENIPUNCTURE: CPT

## 2020-11-09 PROCEDURE — 2580000003 HC RX 258: Performed by: NURSE PRACTITIONER

## 2020-11-09 PROCEDURE — 6360000002 HC RX W HCPCS: Performed by: INTERNAL MEDICINE

## 2020-11-09 PROCEDURE — 6370000000 HC RX 637 (ALT 250 FOR IP): Performed by: INTERNAL MEDICINE

## 2020-11-09 RX ADMIN — ENOXAPARIN SODIUM 30 MG: 30 INJECTION SUBCUTANEOUS at 09:23

## 2020-11-09 RX ADMIN — QUETIAPINE FUMARATE 100 MG: 50 TABLET ORAL at 12:44

## 2020-11-09 RX ADMIN — ASPIRIN 81 MG CHEWABLE TABLET 81 MG: 81 TABLET CHEWABLE at 09:23

## 2020-11-09 RX ADMIN — CLONAZEPAM 1 MG: 1 TABLET ORAL at 12:44

## 2020-11-09 RX ADMIN — HYDROCORTISONE: 1 CREAM TOPICAL at 09:23

## 2020-11-09 RX ADMIN — BUSPIRONE HYDROCHLORIDE 30 MG: 10 TABLET ORAL at 23:04

## 2020-11-09 RX ADMIN — HYDROCORTISONE: 1 CREAM TOPICAL at 23:20

## 2020-11-09 RX ADMIN — LACTULOSE 10 G: 20 SOLUTION ORAL at 09:23

## 2020-11-09 RX ADMIN — DOCUSATE SODIUM 100 MG: 100 CAPSULE ORAL at 09:23

## 2020-11-09 RX ADMIN — Medication 10 ML: at 23:05

## 2020-11-09 RX ADMIN — REMDESIVIR 100 MG: 5 INJECTION INTRAVENOUS at 14:43

## 2020-11-09 RX ADMIN — BUSPIRONE HYDROCHLORIDE 30 MG: 10 TABLET ORAL at 09:23

## 2020-11-09 RX ADMIN — QUETIAPINE FUMARATE 100 MG: 50 TABLET ORAL at 09:23

## 2020-11-09 RX ADMIN — Medication 10 ML: at 09:23

## 2020-11-09 RX ADMIN — QUETIAPINE FUMARATE 100 MG: 50 TABLET ORAL at 23:04

## 2020-11-09 RX ADMIN — DIVALPROEX SODIUM 500 MG: 500 TABLET, DELAYED RELEASE ORAL at 23:05

## 2020-11-09 RX ADMIN — SIMVASTATIN 20 MG: 20 TABLET, FILM COATED ORAL at 23:05

## 2020-11-09 RX ADMIN — DEXAMETHASONE 6 MG: 4 TABLET ORAL at 12:44

## 2020-11-09 RX ADMIN — NIACIN 500 MG: 500 TABLET, EXTENDED RELEASE ORAL at 09:23

## 2020-11-09 RX ADMIN — CLONAZEPAM 1 MG: 1 TABLET ORAL at 09:23

## 2020-11-09 RX ADMIN — ENOXAPARIN SODIUM 30 MG: 30 INJECTION SUBCUTANEOUS at 23:05

## 2020-11-09 RX ADMIN — DIVALPROEX SODIUM 500 MG: 500 TABLET, DELAYED RELEASE ORAL at 09:23

## 2020-11-09 RX ADMIN — CLONAZEPAM 1 MG: 1 TABLET ORAL at 23:05

## 2020-11-09 ASSESSMENT — PAIN SCALES - GENERAL: PAINLEVEL_OUTOF10: 0

## 2020-11-09 ASSESSMENT — PAIN SCALES - WONG BAKER: WONGBAKER_NUMERICALRESPONSE: 0

## 2020-11-09 NOTE — PROGRESS NOTES
Hospitalist Progress Note      PCP: Monica Zambrano MD    Date of Admission: 11/5/2020    Chief Complaint:  No acute events, afebrile, stable HD, on RA    Medications:  Reviewed    Infusion Medications   Scheduled Medications    influenza virus vaccine  0.5 mL Intramuscular Prior to discharge    dexamethasone  6 mg Oral Daily    sodium chloride flush  10 mL Intravenous 2 times per day    aspirin  81 mg Oral Daily    busPIRone  30 mg Oral BID    clonazePAM  1 mg Oral TID    divalproex  500 mg Oral BID    docusate sodium  100 mg Oral Daily    hydrocortisone   Topical BID    lactulose  10 g Oral Daily    niacin  500 mg Oral Daily    QUEtiapine  100 mg Oral TID    simvastatin  20 mg Oral Nightly    enoxaparin  30 mg Subcutaneous BID    remdesivir IVPB  100 mg Intravenous Q24H     PRN Meds: acetaminophen **OR** acetaminophen, guaiFENesin-dextromethorphan, sodium chloride flush, polyethylene glycol, promethazine **OR** ondansetron, albuterol sulfate HFA, acetaminophen, albuterol, magnesium hydroxide, guaiFENesin, sodium chloride      Intake/Output Summary (Last 24 hours) at 11/9/2020 1424  Last data filed at 11/9/2020 1255  Gross per 24 hour   Intake 750 ml   Output 2400 ml   Net -1650 ml       Exam:    BP (!) 157/65   Pulse 57   Temp 97.5 °F (36.4 °C) (Oral)   Resp 16   Ht 5' 5\" (1.651 m)   Wt 168 lb (76.2 kg)   SpO2 95%   BMI 27.96 kg/m²     General appearance: appears stated age and cooperative. Respiratory:  clear to auscultation bilaterally. Cardiovascular: Regular rate and rhythm with normal S1/S2   Abdomen: Soft, active bowel sounds. Musculoskeletal: No edema bilaterally.      Labs:   Recent Labs     11/08/20 0638 11/09/20  0735   WBC 5.6 5.8   HGB 12.8 13.6   HCT 37.6 41.4   * 113*     Recent Labs     11/08/20 0638 11/09/20  0735    135   K 4.4 4.1   CL 99 99   CO2 25 23   BUN 14 16   CREATININE 0.48* 0.40*   CALCIUM 8.7 8.4*     Recent Labs     11/08/20 0638 11/09/20  0735 AST 43* 43*   ALT 38* 40*   BILITOT 0.3 0.3   ALKPHOS 56 53     Recent Labs     11/07/20  0831 11/08/20  0638 11/09/20  1014   INR 0.9 1.0 1.0     No results for input(s): Nicole Caal in the last 72 hours. Urinalysis:      Lab Results   Component Value Date    NITRU Negative 11/04/2020    WBCUA 3-5 11/04/2020    BACTERIA Negative 11/04/2020    RBCUA 0-2 11/04/2020    BLOODU Trace-intact 11/04/2020    SPECGRAV 1.025 11/04/2020    GLUCOSEU Negative 11/04/2020    GLUCOSEU NEG 03/02/2012       Radiology:  RADIOLOGY REPORT   Final Result      US DUP LOWER EXTREMITIES BILATERAL VENOUS   Final Result      NO DVT IDENTIFIED IN EITHER LOWER EXTREMITY.                     Assessment/Plan:    [de-identified] y/o female with history of MRDD, seizure disorder who presented with:    COVID-19 pneumonia  - on Decadron, Remdesivir, SC Lovenox  - on RA  - management per ID service    Thrombocytopenia   - improving     Seizure disorder  -Continue home Depakote     Mood disorder, intellectual disability  - Continue home medications            Electronically signed by Florencio Boland MD on 11/9/2020 at 2:24 PM

## 2020-11-09 NOTE — CARE COORDINATION
Phone call to Kisha Villalba [facility] at [facility]. She said they do plan for pt to return to them at [place].

## 2020-11-09 NOTE — PROGRESS NOTES
Assumed care of patient at [place] [id number]. Pt blind and mute, agitated at times. Assisted to bedside commode, patient was able to pivot. Voided scant amount of urine. Currently back in bed. Mild cough, lungs diminished at bases with mild expiratory wheeze noted. Patient hits self occasionally, needs redirected. Avasys in room for safety.

## 2020-11-09 NOTE — PROGRESS NOTES
Patient is blind and mute. She is continent of bowel and bladder. If you take her hands and put her legs to the side of the bed and lead her to the bedside commode she will urinate and have bms. She has no open areas on her skin but the left side of her face is red at times because she slaps herself when aggravated. Her lung sounds are diminished with a slight wheeze at times. She does have a very mild cough at times but no production. She has no swelling at this time. No new orders at this time.

## 2020-11-09 NOTE — PROGRESS NOTES
Pt is nonverbal and MRDD. She is covid positive. She has moist cough. On room air. She is a feed, but has good appetite. She took her pills this morning crushed in applesauce. Assisted patient to bedside commode to urinate. She has an avasys in her room for safety measures. Bed alarm on.      Electronically signed by Liliana Baird RN on 11/9/2020 at 1:22 PM

## 2020-11-10 VITALS
DIASTOLIC BLOOD PRESSURE: 119 MMHG | BODY MASS INDEX: 27.99 KG/M2 | SYSTOLIC BLOOD PRESSURE: 133 MMHG | RESPIRATION RATE: 17 BRPM | TEMPERATURE: 97 F | WEIGHT: 168 LBS | HEIGHT: 65 IN | HEART RATE: 86 BPM | OXYGEN SATURATION: 98 %

## 2020-11-10 LAB
ALBUMIN SERPL-MCNC: 3.6 G/DL (ref 3.5–4.6)
ALP BLD-CCNC: 55 U/L (ref 40–130)
ALT SERPL-CCNC: 40 U/L (ref 0–33)
ANION GAP SERPL CALCULATED.3IONS-SCNC: 11 MEQ/L (ref 9–15)
APTT: 35.5 SEC (ref 24.4–36.8)
AST SERPL-CCNC: 33 U/L (ref 0–35)
BILIRUB SERPL-MCNC: 0.3 MG/DL (ref 0.2–0.7)
BUN BLDV-MCNC: 15 MG/DL (ref 8–23)
C-REACTIVE PROTEIN, HIGH SENSITIVITY: 0.6 MG/L (ref 0–5)
CALCIUM SERPL-MCNC: 8.4 MG/DL (ref 8.5–9.9)
CHLORIDE BLD-SCNC: 102 MEQ/L (ref 95–107)
CO2: 27 MEQ/L (ref 20–31)
CREAT SERPL-MCNC: 0.5 MG/DL (ref 0.5–0.9)
D DIMER: 0.43 MG/L FEU (ref 0–0.5)
FERRITIN: 98.4 NG/ML (ref 13–150)
FIBRINOGEN: 218 MG/DL (ref 235–507)
GFR AFRICAN AMERICAN: >60
GFR NON-AFRICAN AMERICAN: >60
GLOBULIN: 2.5 G/DL (ref 2.3–3.5)
GLUCOSE BLD-MCNC: 102 MG/DL (ref 70–99)
HCT VFR BLD CALC: 37 % (ref 37–47)
HEMOGLOBIN: 12.8 G/DL (ref 12–16)
INR BLD: 1
MCH RBC QN AUTO: 33.3 PG (ref 27–31.3)
MCHC RBC AUTO-ENTMCNC: 34.7 % (ref 33–37)
MCV RBC AUTO: 95.9 FL (ref 82–100)
PDW BLD-RTO: 13 % (ref 11.5–14.5)
PLATELET # BLD: 143 K/UL (ref 130–400)
POTASSIUM SERPL-SCNC: 4.3 MEQ/L (ref 3.4–4.9)
PROTHROMBIN TIME: 13.1 SEC (ref 12.3–14.9)
RBC # BLD: 3.86 M/UL (ref 4.2–5.4)
SODIUM BLD-SCNC: 140 MEQ/L (ref 135–144)
TOTAL PROTEIN: 6.1 G/DL (ref 6.3–8)
WBC # BLD: 6.3 K/UL (ref 4.8–10.8)

## 2020-11-10 PROCEDURE — 85730 THROMBOPLASTIN TIME PARTIAL: CPT

## 2020-11-10 PROCEDURE — 85379 FIBRIN DEGRADATION QUANT: CPT

## 2020-11-10 PROCEDURE — 85027 COMPLETE CBC AUTOMATED: CPT

## 2020-11-10 PROCEDURE — 86141 C-REACTIVE PROTEIN HS: CPT

## 2020-11-10 PROCEDURE — 82728 ASSAY OF FERRITIN: CPT

## 2020-11-10 PROCEDURE — 85610 PROTHROMBIN TIME: CPT

## 2020-11-10 PROCEDURE — 80053 COMPREHEN METABOLIC PANEL: CPT

## 2020-11-10 PROCEDURE — 36415 COLL VENOUS BLD VENIPUNCTURE: CPT

## 2020-11-10 PROCEDURE — 85384 FIBRINOGEN ACTIVITY: CPT

## 2020-11-10 ASSESSMENT — PAIN SCALES - WONG BAKER
WONGBAKER_NUMERICALRESPONSE: 0

## 2020-11-10 NOTE — PROGRESS NOTES
Report called to Julian Bellamy at [facility], IV removed, dressing reinforced, patient tolerated well.

## 2020-11-10 NOTE — DISCHARGE SUMMARY
Hospital Medicine Discharge Summary    Eduardo Salazar  :  1940  MRN:  [id number]    Admit date:  2020  Discharge date:  11/10/2020    Admitting Physician:  Kaya Cristobal DO  Primary Care Physician:  Teofilo Quan MD      Discharge Diagnoses: Active Problems:    MR (mental retardation)    Seizure disorder (HCC)    Legally blind    Mood disorder (HCC)    Dyslipidemia    Drug-induced thrombocytopenia    Pneumonia due to COVID-19 virus  Resolved Problems:    * No resolved hospital problems. *      Hospital Course:   Eduardo Salazar is a [de-identified] y.o. female that was admitted and treated at [facility] for the following medical issues:     COVID-19 pneumonia  - completed course of Remdesivir and Decadron,   - clinically improved, was saturating well on RA  - followed by ID service      Disposition - back to her group home         Patient was seen by the following consultants while admitted to [facility]:   Consults:  [address]    Significant Diagnostic Studies:    Us Dup Lower Extremities Bilateral Venous    Result Date: 2020  US DUP LOWER EXTREMITIES BILATERAL VENOUS : 2020 CLINICAL HISTORY: Lower extremity swelling. Covid-19 positive. COMPARISON: None available. Grayscale, compression, color and waveform Doppler analysis of both lower extremity deep venous systems was performed with augmentation. FINDINGS: There is no deep venous thrombosis, abnormal masses, fluid collections or other findings of concern identified within either lower extremity. NO DVT IDENTIFIED IN EITHER LOWER EXTREMITY. Discharge Medications:       Treasure Stoddard   Home Medication Instructions [id number]    Printed on:11/10/20 1253   Medication Information                      acetaminophen (TYLENOL) 325 MG tablet  Take 650 mg by mouth every 4 hours as needed. May give rectal if unable to tolerate PO administration.   Indications: Fever, Pain             albuterol (PROVENTIL) (2.5 MG/3ML) 0.083% nebulizer solution  Take 2.5 mg by nebulization every 6 hours as needed for Wheezing             aspirin 81 MG tablet  Take 81 mg by mouth daily             atropine 1 % ophthalmic solution  Place 1 drop into the left eye daily             busPIRone (BUSPAR) 30 MG tablet  Take 30 mg by mouth 2 times daily             calcium-vitamin D (OYSTER CALCIUM 500 + D) 500-200 MG-UNIT per tablet  Take 1 tablet by mouth 2 times daily. Administer with food. Indications: supplement             carbamide peroxide (DEBROX) 6.5 % otic solution  4 drops 2 times daily Instill 4 drops to affected ears twice daily for 4 days followed by flush on day 5 as needed for cerumen obstruction             Cetirizine HCl 10 MG CAPS  Take 10 mg by mouth daily. chlorhexidine (PERIDEX) 0.12 % solution  Take 15 mLs by mouth 2 times daily Apply to gums. clonazePAM (KLONOPIN) 1 MG tablet  Take 1 tablet by mouth 3 times daily for 150 days. divalproex (DEPAKOTE) 250 MG DR tablet  Take 500 mg by mouth 2 times daily              docusate sodium (COLACE) 100 MG capsule  Take 100 mg by mouth daily Indications: Chronic Constipation Give 2 capsules daily. Hold for loose stools. fluticasone (FLONASE) 50 MCG/ACT nasal spray  1 spray by Nasal route 2 times daily. Indications: Allergic Rhinitis             hydrocortisone (PREPARATION H) 1 % cream  Apply topically 2 times daily Apply topically to hemorrhoids 3 times daily. ibuprofen (ADVIL;MOTRIN) 200 MG tablet  Take 200 mg by mouth every 6 hours as needed for Pain Two tablets every 6 hours as needed. lactulose encephalopathy 10 GM/15ML SOLN solution  Take 10 g by mouth daily Give 15 ml by mouth once daily             Magnesium Hydroxide (MILK OF MAGNESIA PO)  Take 30 mLs by mouth daily as needed.  Indications: Constipation             Multiple Vitamin (MULTIVITAMIN PO)  Take 1 tablet by mouth daily. Indications: multivitamin             mupirocin (BACTROBAN) 2 % ointment  Indications: History MRSA. Apply INTRANASALLY BID. RUB NARES TOGETHER.             neomycin-bacitracin-polymyxin (NEOSPORIN) 5-400-5000 ointment  Apply topically 4 times daily Apply topically 4 times daily. niacin (NIASPAN) 500 MG CR tablet  Take 500 mg by mouth daily Indications: Dyslipidemia              niacin (SLO-NIACIN) 500 MG extended release tablet  Take 500 mg by mouth nightly             OXYGEN  Inhale 2 L/min into the lungs as needed. ADMINISTER VIA N/C.  TITRATE PRN TO MAINTAIN PULSE OX ABOVE 92%. Indications: Hypoxia             Polyvinyl Alcohol-Povidone (REFRESH OP)  Apply to eye One drop each eye four times a day             Probiotic Product (PROBIOTIC PO)  Take 1 capsule by mouth 2 times daily as needed              Protein POWD  Take by mouth Give 1 teaspoon (5ml) in water/juice BID             pseudoephedrine-codeine-guaifenesin (MYTUSSIN DAC) 30- MG/5ML solution  Take 10 mLs by mouth 4 times daily as needed for Cough             psyllium (REGULOID) 58.6 % powder  Take 1 packet by mouth 3 times daily Take by mouth 3 times daily. QUEtiapine (SEROQUEL) 100 MG tablet  Take 100 mg by mouth 3 times daily             simvastatin (ZOCOR) 20 MG tablet  Take 20 mg by mouth nightly             sodium chloride (OCEAN, BABY AYR) 0.65 % nasal spray  1 spray by Nasal route as needed for Congestion             Sodium Phosphates (FLEET)  Place 1 enema rectally daily as needed.  Indications: Constipation             Sunscreens (CHAPSTICK) STCK  Apply topically as needed             vitamin D (ERGOCALCIFEROL) 82834 UNITS CAPS capsule  Take 50,000 Units by mouth once a week Indications: Vitamin D Deficiency On Mondays             zoster recombinant adjuvanted vaccine (SHINGRIX) 50 MCG/0.5ML SUSR injection  Inject 0.5 mLs into the muscle See Admin Instructions 1 dose now and repeat in 2-6 months post hospitalization evaluation. This specific office visit is covered by your insurance, and is not the same as your annual doctor visit/ check up. This office visit is important, as it may prevent need for repeat and/or future hospitalizations. **    Your medical team at [facility] appreciates the opportunity to work with you to get well!     Sincerely,  Lisseth Christy

## 2020-11-10 NOTE — CARE COORDINATION
Spoke with Lucia Mayorga with [facility] who confirmed they are ready to accept pt back.   Transport scheduled for 3 pm.  Phone call to guardian and reviewed IMM and notified of DC

## 2020-11-10 NOTE — PROGRESS NOTES
Physician Progress Note      PATIENT:               David Burgos  CSN #:                  [id number]  :                       1940  ADMIT DATE:       2020 12:57 AM  [address] [place] DATE:  RESPONDING  PROVIDER #:        Angelica Bearden MD          QUERY TEXT:    Dear Attending:  Patient admitted with COVID 19. Noted documentation of pneumonia in H/P on   20. If possible, please document in progress notes and discharge summary:    The medical record reflects the following:  Risk Factors: COVID 19 detected x 1  Clinical Indicators:  transfer from [facility] w positive COVID test   presenting w SOB, cough. per H/P:  \"Pneumonia due to COVID-19 virus\",  CXR No   infiltrative bone study with bronchovascular crowding which is likely   secondary to thoracic kyphosis. O2 sats 97-98% on 3-4L/NC  no resp rate on   chart  Treatment: CXR x 1 on day of admit, no CT scan of chest   decadron consult ID   remdesivir    Thank you,  Lito LOTTN RN CDS  contact [address] [place], [place] [address]  or  [email or web address] w/ any questions  Options provided:  -- pneumonia  present as evidenced by, Please document evidence. -- pneumonia was ruled out  -- Other - I will add my own diagnosis  -- Disagree - Not applicable / Not valid  -- Disagree - Clinically unable to determine / Unknown  -- Refer to Clinical Documentation Reviewer    PROVIDER RESPONSE TEXT:    pneumonia was ruled out after study.     Query created by: Aby Roberts on 11/10/2020 2:25 PM      Electronically signed by:  Angelica Bearden MD 11/10/2020 2:48 PM

## 2020-11-10 NOTE — FLOWSHEET NOTE
Shift assessment complete. Pt is blind, mute and can be agitated at times. Pt is resting in bed. I took the pts VS but she wouldn't keep her arm straight. So I will try to recheck BP after a while. Pt took her meds whole in applesauce. Pt able to pivot to bedside commode. Avasys in room for pt safety. Electronically signed by Tera Cortez RN on 11/10/2020 at 12:20 AM      Pt rested well through the night. Will continue to monitor.     Electronically signed by Tera Cortez RN on 11/10/2020 at 5:02 AM

## 2020-11-10 NOTE — PROGRESS NOTES
Patient assessment complete. Refused to hold still for a blood pressure to be obtained, patient very unreceptive to education. Patient refused morning medications also. Will reattempt later.

## 2020-11-10 NOTE — PROGRESS NOTES
Hospitalist Progress Note      PCP: Fe Almaraz MD    Date of Admission: 11/5/2020    Chief Complaint:  No acute events, afebrile, stable HD, on RA    Medications:  Reviewed    Infusion Medications   Scheduled Medications    influenza virus vaccine  0.5 mL Intramuscular Prior to discharge    dexamethasone  6 mg Oral Daily    sodium chloride flush  10 mL Intravenous 2 times per day    aspirin  81 mg Oral Daily    busPIRone  30 mg Oral BID    clonazePAM  1 mg Oral TID    divalproex  500 mg Oral BID    docusate sodium  100 mg Oral Daily    hydrocortisone   Topical BID    lactulose  10 g Oral Daily    niacin  500 mg Oral Daily    QUEtiapine  100 mg Oral TID    simvastatin  20 mg Oral Nightly    enoxaparin  30 mg Subcutaneous BID     PRN Meds: acetaminophen **OR** acetaminophen, guaiFENesin-dextromethorphan, sodium chloride flush, polyethylene glycol, promethazine **OR** ondansetron, albuterol sulfate HFA, acetaminophen, albuterol, magnesium hydroxide, guaiFENesin, sodium chloride      Intake/Output Summary (Last 24 hours) at 11/10/2020 1214  Last data filed at 11/10/2020 0911  Gross per 24 hour   Intake 1595 ml   Output 2600 ml   Net -1005 ml       Exam:    BP (!) 133/119 Comment: pt wouldt keep ar still ad was hitting herself in her head  Pulse 86   Temp 97 °F (36.1 °C) (Axillary)   Resp 17   Ht 5' 5\" (1.651 m)   Wt 168 lb (76.2 kg)   SpO2 98%   BMI 27.96 kg/m²     General appearance: appears stated age and cooperative. Respiratory:  clear to auscultation bilaterally. Cardiovascular: Regular rate and rhythm with normal S1/S2   Abdomen: Soft, active bowel sounds. Musculoskeletal: No edema bilaterally.      Labs:   Recent Labs     11/08/20  0638 11/09/20  0735 11/10/20  0636   WBC 5.6 5.8 6.3   HGB 12.8 13.6 12.8   HCT 37.6 41.4 37.0   * 113* 143     Recent Labs     11/08/20  0638 11/09/20  0735 11/10/20  0636    135 140   K 4.4 4.1 4.3   CL 99 99 102   CO2 25 23 27   BUN 14 16 15 CREATININE 0.48* 0.40* 0.50   CALCIUM 8.7 8.4* 8.4*     Recent Labs     11/08/20  0638 11/09/20  0735 11/10/20  0636   AST 43* 43* 33   ALT 38* 40* 40*   BILITOT 0.3 0.3 0.3   ALKPHOS 56 53 55     Recent Labs     11/08/20  0638 11/09/20  1014 11/10/20  0636   INR 1.0 1.0 1.0     No results for input(s): Earlis [place] in the last 72 hours. Urinalysis:      Lab Results   Component Value Date    NITRU Negative 11/04/2020    WBCUA 3-5 11/04/2020    BACTERIA Negative 11/04/2020    RBCUA 0-2 11/04/2020    BLOODU Trace-intact 11/04/2020    SPECGRAV 1.025 11/04/2020    GLUCOSEU Negative 11/04/2020    GLUCOSEU NEG 03/02/2012       Radiology:  RADIOLOGY REPORT   Final Result      US DUP LOWER EXTREMITIES BILATERAL VENOUS   Final Result      NO DVT IDENTIFIED IN EITHER LOWER EXTREMITY.                     Assessment/Plan:    [de-identified] y/o female with history of MRDD, seizure disorder who presented with:    COVID-19 pneumonia  - completed course of Remdesivir and Decadron,   - clinically improved, on RA  - followed by ID service    Thrombocytopenia   - resolved     Seizure disorder  -Continue home Depakote     Mood disorder, intellectual disability  - Continue home medications      Disposition - back to her group home today      Electronically signed by Chiara Suero MD on 11/10/2020 at 12:14 PM

## 2021-01-15 ENCOUNTER — VIRTUAL VISIT (OUTPATIENT)
Dept: INTERNAL MEDICINE | Age: 81
End: 2021-01-15
Payer: MEDICARE

## 2021-01-15 DIAGNOSIS — D68.69 OTHER THROMBOPHILIA (HCC): ICD-10-CM

## 2021-01-15 DIAGNOSIS — F79 INTELLECTUAL DISABILITY: ICD-10-CM

## 2021-01-15 DIAGNOSIS — E78.5 DYSLIPIDEMIA: Primary | ICD-10-CM

## 2021-01-15 DIAGNOSIS — F39 MOOD DISORDER (HCC): ICD-10-CM

## 2021-01-15 DIAGNOSIS — G40.909 SEIZURE DISORDER (HCC): ICD-10-CM

## 2021-01-15 PROBLEM — J18.9 PNEUMONIA: Status: RESOLVED | Noted: 2020-11-04 | Resolved: 2021-01-15

## 2021-01-15 PROCEDURE — 1123F ACP DISCUSS/DSCN MKR DOCD: CPT | Performed by: FAMILY MEDICINE

## 2021-01-15 PROCEDURE — G8399 PT W/DXA RESULTS DOCUMENT: HCPCS | Performed by: FAMILY MEDICINE

## 2021-01-15 PROCEDURE — 4040F PNEUMOC VAC/ADMIN/RCVD: CPT | Performed by: FAMILY MEDICINE

## 2021-01-15 PROCEDURE — 99213 OFFICE O/P EST LOW 20 MIN: CPT | Performed by: FAMILY MEDICINE

## 2021-01-15 PROCEDURE — 1090F PRES/ABSN URINE INCON ASSESS: CPT | Performed by: FAMILY MEDICINE

## 2021-01-15 PROCEDURE — G8427 DOCREV CUR MEDS BY ELIG CLIN: HCPCS | Performed by: FAMILY MEDICINE

## 2021-01-15 ASSESSMENT — ENCOUNTER SYMPTOMS
APNEA: 0
EYE PAIN: 0
EYE ITCHING: 0
EYE DISCHARGE: 0
CHOKING: 0
CHEST TIGHTNESS: 0

## 2021-01-15 NOTE — PROGRESS NOTES
Patient: Marisa Mccray    YOB: 1940    Date: 1/15/21       Patient Active Problem List    Diagnosis Date Noted    Pneumonia 11/04/2020    Pneumonia due to COVID-19 virus 11/04/2020    Aphakia, left eye 10/08/2018    Hyphema, left eye 10/08/2018    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    Leucopenia 03/07/2018    Drug-induced thrombocytopenia 12/07/2017     Overview Note:     depakote      Mental retardation 09/03/2013     Overview Note:     Replacing deprecated diagnoses      Dyslipidemia     Periodontal disease     Full code status 07/31/2013     Overview Note:     5/1/05  FULL CODE        Dietary restriction 07/31/2013     Overview Note:     4/21/10  SMALL PORTIONS BLEND. NO RAW FRUIT OR VEGETABLES EXCEPT BANANAS. NO GAS PRODUCING FOODS.  2/22/12  2 SCOOPS OF PROTEIN POWDER PO QD, 1 SCOOP AT BREAKFAST AND 1 SCOOP AT Dozwil.       Vitamin D deficiency     MR (mental retardation)      Overview Note:     Replacing deprecated diagnoses      Seizure disorder ([facility] [address]      Overview Note:           Legally blind     Chronic rhinitis     Mood disorder (HCC)      Overview Note:     Psychiatry        Past Medical History:   Diagnosis Date    Ceruminosis     Chronic rhinitis     Dyslipidemia     History of encephalitis     Large bowel obstruction (HCC)     Legally blind     Mood disorder ([address]     MR (mental retardation)     Osteoporosis     Periodontal disease     Pneumonia 12/9/2018    SBO (small bowel obstruction) ([address] 3/22/2019    Seizure disorder ([facility] [address]     Vitamin D deficiency      Past Surgical History:   Procedure Laterality Date    COLONOSCOPY N/A 3/22/2019    COLONOSCOPY performed by Javier Hopson MD at [address] [place] [address] History     Socioeconomic History    Marital status: Single     Spouse name: Not on file    Number of children: Not on file    Years of education: Not on file    Highest education level: Not on file   Occupational History  Protein POWD Take by mouth Give 1 teaspoon (5ml) in water/juice BID      mupirocin (BACTROBAN) 2 % ointment Indications: History MRSA. Apply INTRANASALLY BID. RUB NARES TOGETHER. 1 Tube 11    zoster recombinant adjuvanted vaccine (SHINGRIX) 50 MCG/0.5ML SUSR injection Inject 0.5 mLs into the muscle See Admin Instructions 1 dose now and repeat in 2-6 months 0.5 mL 0    busPIRone (BUSPAR) 30 MG tablet Take 30 mg by mouth 2 times daily      QUEtiapine (SEROQUEL) 100 MG tablet Take 100 mg by mouth 3 times daily      neomycin-bacitracin-polymyxin (NEOSPORIN) 5-400-5000 ointment Apply topically 4 times daily Apply topically 4 times daily.  clonazePAM (KLONOPIN) 1 MG tablet Take 1 tablet by mouth 3 times daily for 150 days. 90 tablet 5    Sunscreens (CHAPSTICK) STCK Apply topically as needed      aspirin 81 MG tablet Take 81 mg by mouth daily      divalproex (DEPAKOTE) 250 MG DR tablet Take 500 mg by mouth 2 times daily       lactulose encephalopathy 10 GM/15ML SOLN solution Take 10 g by mouth daily Give 15 ml by mouth once daily      psyllium (REGULOID) 58.6 % powder Take 1 packet by mouth 3 times daily Take by mouth 3 times daily.  sodium chloride (OCEAN, BABY AYR) 0.65 % nasal spray 1 spray by Nasal route as needed for Congestion      carbamide peroxide (DEBROX) 6.5 % otic solution 4 drops 2 times daily Instill 4 drops to affected ears twice daily for 4 days followed by flush on day 5 as needed for cerumen obstruction      ibuprofen (ADVIL;MOTRIN) 200 MG tablet Take 200 mg by mouth every 6 hours as needed for Pain Two tablets every 6 hours as needed.  Probiotic Product (PROBIOTIC PO) Take 1 capsule by mouth 2 times daily as needed       pseudoephedrine-codeine-guaifenesin (MYTUSSIN DAC) 30- MG/5ML solution Take 10 mLs by mouth 4 times daily as needed for Cough      Sodium Phosphates (FLEET) Place 1 enema rectally daily as needed.  Indications: Constipation  OXYGEN Inhale 2 L/min into the lungs as needed. ADMINISTER VIA N/C.  TITRATE PRN TO MAINTAIN PULSE OX ABOVE 92%. Indications: Hypoxia      Cetirizine HCl 10 MG CAPS Take 10 mg by mouth daily. 90 capsule 3    niacin (NIASPAN) 500 MG CR tablet Take 500 mg by mouth daily Indications: Dyslipidemia       acetaminophen (TYLENOL) 325 MG tablet Take 650 mg by mouth every 4 hours as needed. May give rectal if unable to tolerate PO administration. Indications: Fever, Pain      Magnesium Hydroxide (MILK OF MAGNESIA PO) Take 30 mLs by mouth daily as needed. Indications: Constipation      docusate sodium (COLACE) 100 MG capsule Take 100 mg by mouth daily Indications: Chronic Constipation Give 2 capsules daily. Hold for loose stools.  fluticasone (FLONASE) 50 MCG/ACT nasal spray 1 spray by Nasal route 2 times daily. Indications: Allergic Rhinitis      Multiple Vitamin (MULTIVITAMIN PO) Take 1 tablet by mouth daily. Indications: multivitamin      calcium-vitamin D (OYSTER CALCIUM 500 + D) 500-200 MG-UNIT per tablet Take 1 tablet by mouth 2 times daily. Administer with food. Indications: supplement      vitamin D (ERGOCALCIFEROL) 54709 UNITS CAPS capsule Take 50,000 Units by mouth once a week Indications: Vitamin D Deficiency On Mondays       No current facility-administered medications on file prior to visit. No Known Allergies    Chief Complaint   Patient presents with    Hyperlipidemia     TELEHEALTH EVALUATION -- Audio/Visual (During OJURX-36 public health emergency)    Due to COVID 19 outbreak, patient's office visit was converted to a virtual visit. Patient was contacted and agreed to proceed with a virtual visit via [email or web address] me  The risks and benefits of converting to a virtual visit were discussed in light of the current infectious disease epidemic. Patient also understood that insurance coverage and co-pays are up to their individual insurance plans. Pursuant to the emergency declaration under the [facility] waiver authority and the [employer] Act, this Virtual  Visit was conducted, with patient's consent, to reduce the patient's risk of exposure to COVID-19 and provide continuity of care for an established patient. Services were provided through a video discussion virtually to substitute for in-person clinic visit. HPI:    Six-month follow-up for [facility] care patient, resides at group home. Patient is nonverbal she is seen with her caretaker. History provided by caretaker. Hyperlipidemia:  No new myalgias or GI upset on simvastatin (Zocor). No results found for: LABA1C  Lab Results   Component Value Date    CREATININE 0.50 11/10/2020     Lab Results   Component Value Date    ALT 40 (H) 11/10/2020    AST 33 11/10/2020     Lab Results   Component Value Date    CHOL 159 07/21/2020    TRIG 105 07/21/2020    HDL 50 07/21/2020    LDLCALC 88 07/21/2020        Diabetes and Hypertension Visit Information    BP Readings from Last 3 Encounters:   11/09/20 (!) 133/119   11/04/20 117/71   05/06/20 (!) 157/87                Have you seen any other physician or provider since your last visit? No  Have you had any other diagnostic tests since your last visit? No  Have you been seen in the emergency room and/or had an admission to a hospital since we last saw you?  No    Patient Care Team:  Veronica Verdugo MD as PCP - General (Family Medicine)  Veronica Verdugo MD as PCP - [facility]           Health Maintenance   Topic Date Due    Flu vaccine (1) 09/01/2020    Annual Wellness Visit (AWV)  05/07/2021    Lipid screen  07/21/2021    DTaP/Tdap/Td vaccine (3 - Td) 12/18/2028    DEXA (modify frequency per FRAX score)  Completed    Shingles Vaccine  Completed    Pneumococcal 65+ years Vaccine  Completed    Hepatitis A vaccine  Aged Out  Hepatitis B vaccine  Aged Out    Hib vaccine  Aged Out    Meningococcal (ACWY) vaccine  Aged Out     Seizure disorder  Caretaker denies any recent seizures  Patient is doing well on antiseizure medications, no medication side effects noted  She currently does follow with neurology    Developmental delay, mood disorder  Patient currently following with psychiatry, has been stable    Thrombocytopenia  Ongoing for years, platelets recently improved  Secondary to Depakote use, medication side effect    Vision & dental UTD    Review of Systems   Constitutional: Negative for activity change, appetite change and chills. HENT: Negative for congestion, dental problem and drooling. Eyes: Negative for pain, discharge and itching. Respiratory: Negative for apnea, choking and chest tightness. Physical Exam    Nursing note reviewed. Constitutional:       General: no acute distress. Appearance: Normal appearance. normal weight. not ill-appearing, toxic-appearing or diaphoretic. HENT:      Head: Normocephalic and atraumatic. Right Ear: External ear normal.      Left Ear: External ear normal.      Nose: Nose normal.   Eyes:      General: No scleral icterus. Right eye: No discharge. Left eye: No discharge. Extraocular Movements: Extraocular movements intact. Conjunctiva/sclera: Conjunctivae normal.   Neck:      Musculoskeletal: Normal range of motion. Pulmonary:      Effort: Pulmonary effort is normal.       Due to this being a TeleHealth encounter, evaluation of the following organ systems is limited: Vitals/Constitutional/EENT/Resp/CV/GI//MS/Neuro/Skin/Heme-Lymph-Imm. Assessment/Plan:  Sanjana Graf was seen today for hyperlipidemia.     Diagnoses and all orders for this visit:    Dyslipidemia  Stable, controlled  Plan: continue current medications    MR (mental retardation)  Mood disorder (HCC)  Unchanged    Seizure disorder (HCC)  Stable, controlled Plan: continue current medications  Cont f/u neuro    Other thrombophilia ([facility] [address]  Stable  2/2 meds        Return in about 6 months (around 7/15/2021) for AWV. Mitch Rutherford MD      Patient aware that encounter is billable to insurance. This encounter occurred with patient at home while provider was at the office.

## 2021-01-19 LAB
ALBUMIN SERPL-MCNC: 3.3 G/DL
ALP BLD-CCNC: 54 U/L
ALT SERPL-CCNC: 11 U/L
ANION GAP SERPL CALCULATED.3IONS-SCNC: NORMAL MMOL/L
AST SERPL-CCNC: 19 U/L
BASOPHILS ABSOLUTE: 0 /ΜL
BASOPHILS RELATIVE PERCENT: 0.5 %
BILIRUB SERPL-MCNC: 0.4 MG/DL (ref 0.1–1.4)
BUN BLDV-MCNC: 17 MG/DL
CALCIUM SERPL-MCNC: 9.1 MG/DL
CHLORIDE BLD-SCNC: 107 MMOL/L
CHOLESTEROL, TOTAL: 136 MG/DL
CHOLESTEROL/HDL RATIO: NORMAL
CO2: 29 MMOL/L
CREAT SERPL-MCNC: 0.6 MG/DL
EOSINOPHILS ABSOLUTE: 0.1 /ΜL
EOSINOPHILS RELATIVE PERCENT: 1.6 %
GFR CALCULATED: NORMAL
GLUCOSE BLD-MCNC: 95 MG/DL
HCT VFR BLD CALC: 36.9 % (ref 36–46)
HDLC SERPL-MCNC: 46 MG/DL (ref 35–70)
HEMOGLOBIN: 12.5 G/DL (ref 12–16)
LDL CHOLESTEROL CALCULATED: 76 MG/DL (ref 0–160)
LYMPHOCYTES ABSOLUTE: 2.5 /ΜL
LYMPHOCYTES RELATIVE PERCENT: 55 %
MCH RBC QN AUTO: 33.6 PG
MCHC RBC AUTO-ENTMCNC: 33.8 G/DL
MCV RBC AUTO: 99.4 FL
MONOCYTES ABSOLUTE: 0.4 /ΜL
MONOCYTES RELATIVE PERCENT: 9.9 %
NEUTROPHILS ABSOLUTE: 1.5 /ΜL
NEUTROPHILS RELATIVE PERCENT: 33 %
NONHDLC SERPL-MCNC: NORMAL MG/DL
PLATELET # BLD: 114 K/ΜL
PMV BLD AUTO: 9.9 FL
POTASSIUM SERPL-SCNC: 4.6 MMOL/L
RBC # BLD: 3.72 10^6/ΜL
SODIUM BLD-SCNC: 146 MMOL/L
TOTAL PROTEIN: 5.2
TRIGL SERPL-MCNC: 69 MG/DL
VITAMIN D 25-HYDROXY: 27.13
VITAMIN D2, 25 HYDROXY: NORMAL
VITAMIN D3,25 HYDROXY: NORMAL
VLDLC SERPL CALC-MCNC: 14 MG/DL
WBC # BLD: 4.5 10^3/ML

## 2021-01-26 LAB
BILIRUBIN, URINE: NEGATIVE
BLOOD, URINE: POSITIVE
CLARITY: ABNORMAL
COLOR: YELLOW
GLUCOSE URINE: ABNORMAL
KETONES, URINE: POSITIVE
LEUKOCYTE ESTERASE, URINE: ABNORMAL
NITRITE, URINE: NEGATIVE
PH UA: 5 (ref 4.5–8)
PROTEIN UA: NEGATIVE
SPECIFIC GRAVITY, URINE: 1.02
UROBILINOGEN, URINE: NORMAL

## 2021-02-03 ENCOUNTER — HOSPITAL ENCOUNTER (EMERGENCY)
Age: 81
Discharge: HOME | End: 2021-02-03
Attending: EMERGENCY MEDICINE
Payer: MEDICARE

## 2021-02-03 ENCOUNTER — APPOINTMENT (OUTPATIENT)
Dept: CT IMAGING | Age: 81
End: 2021-02-03
Payer: MEDICARE

## 2021-02-03 ENCOUNTER — APPOINTMENT (OUTPATIENT)
Dept: GENERAL RADIOLOGY | Age: 81
End: 2021-02-03
Payer: MEDICARE

## 2021-02-03 VITALS
RESPIRATION RATE: 20 BRPM | HEART RATE: 90 BPM | DIASTOLIC BLOOD PRESSURE: 59 MMHG | SYSTOLIC BLOOD PRESSURE: 128 MMHG | TEMPERATURE: 97.8 F | OXYGEN SATURATION: 96 %

## 2021-02-03 DIAGNOSIS — N34.2 INFECTIVE URETHRITIS: Primary | ICD-10-CM

## 2021-02-03 LAB
ANION GAP SERPL CALCULATED.3IONS-SCNC: 12 MEQ/L (ref 9–15)
BACTERIA: ABNORMAL /HPF
BASOPHILS ABSOLUTE: 0 K/UL (ref 0–0.2)
BASOPHILS RELATIVE PERCENT: 0.2 %
BILIRUBIN URINE: ABNORMAL
BLOOD, URINE: ABNORMAL
BUN BLDV-MCNC: 18 MG/DL (ref 8–23)
CALCIUM SERPL-MCNC: 9.3 MG/DL (ref 8.5–9.9)
CHLORIDE BLD-SCNC: 102 MEQ/L (ref 95–107)
CLARITY: ABNORMAL
CO2: 28 MEQ/L (ref 20–31)
COLOR: ABNORMAL
CREAT SERPL-MCNC: 0.62 MG/DL (ref 0.5–0.9)
EKG ATRIAL RATE: 81 BPM
EKG P AXIS: 44 DEGREES
EKG P-R INTERVAL: 154 MS
EKG Q-T INTERVAL: 400 MS
EKG QRS DURATION: 90 MS
EKG QTC CALCULATION (BAZETT): 464 MS
EKG R AXIS: -14 DEGREES
EKG T AXIS: 41 DEGREES
EKG VENTRICULAR RATE: 81 BPM
EOSINOPHILS ABSOLUTE: 0 K/UL (ref 0–0.7)
EOSINOPHILS RELATIVE PERCENT: 0.4 %
EPITHELIAL CELLS, UA: ABNORMAL /HPF
GFR AFRICAN AMERICAN: >60
GFR NON-AFRICAN AMERICAN: >60
GLUCOSE BLD-MCNC: 180 MG/DL (ref 70–99)
GLUCOSE URINE: NEGATIVE MG/DL
HCT VFR BLD CALC: 36.2 % (ref 37–47)
HEMOGLOBIN: 12.1 G/DL (ref 12–16)
KETONES, URINE: 15 MG/DL
LEUKOCYTE ESTERASE, URINE: ABNORMAL
LYMPHOCYTES ABSOLUTE: 1.3 K/UL (ref 1–4.8)
LYMPHOCYTES RELATIVE PERCENT: 16.9 %
MCH RBC QN AUTO: 33.3 PG (ref 27–31.3)
MCHC RBC AUTO-ENTMCNC: 33.4 % (ref 33–37)
MCV RBC AUTO: 99.8 FL (ref 82–100)
MONOCYTES ABSOLUTE: 1.1 K/UL (ref 0.2–0.8)
MONOCYTES RELATIVE PERCENT: 14.7 %
NEUTROPHILS ABSOLUTE: 5.1 K/UL (ref 1.4–6.5)
NEUTROPHILS RELATIVE PERCENT: 67.8 %
NITRITE, URINE: POSITIVE
PDW BLD-RTO: 13.5 % (ref 11.5–14.5)
PH UA: 6 (ref 5–9)
PLATELET # BLD: 155 K/UL (ref 130–400)
POTASSIUM SERPL-SCNC: 4 MEQ/L (ref 3.4–4.9)
PROTEIN UA: >=300 MG/DL
RBC # BLD: 3.62 M/UL (ref 4.2–5.4)
RBC UA: >100 /HPF (ref 0–2)
SODIUM BLD-SCNC: 142 MEQ/L (ref 135–144)
SPECIFIC GRAVITY UA: 1.01 (ref 1–1.03)
URINE REFLEX TO CULTURE: YES
UROBILINOGEN, URINE: 2 E.U./DL
WBC # BLD: 7.5 K/UL (ref 4.8–10.8)
WBC UA: ABNORMAL /HPF (ref 0–5)

## 2021-02-03 PROCEDURE — 87077 CULTURE AEROBIC IDENTIFY: CPT

## 2021-02-03 PROCEDURE — 96372 THER/PROPH/DIAG INJ SC/IM: CPT

## 2021-02-03 PROCEDURE — 87186 SC STD MICRODIL/AGAR DIL: CPT

## 2021-02-03 PROCEDURE — 81001 URINALYSIS AUTO W/SCOPE: CPT

## 2021-02-03 PROCEDURE — 93005 ELECTROCARDIOGRAM TRACING: CPT

## 2021-02-03 PROCEDURE — 87086 URINE CULTURE/COLONY COUNT: CPT

## 2021-02-03 PROCEDURE — 6360000004 HC RX CONTRAST MEDICATION: Performed by: EMERGENCY MEDICINE

## 2021-02-03 PROCEDURE — 85025 COMPLETE CBC W/AUTO DIFF WBC: CPT

## 2021-02-03 PROCEDURE — 99284 EMERGENCY DEPT VISIT MOD MDM: CPT

## 2021-02-03 PROCEDURE — 80048 BASIC METABOLIC PNL TOTAL CA: CPT

## 2021-02-03 PROCEDURE — 74177 CT ABD & PELVIS W/CONTRAST: CPT

## 2021-02-03 PROCEDURE — 6360000002 HC RX W HCPCS: Performed by: EMERGENCY MEDICINE

## 2021-02-03 PROCEDURE — 71045 X-RAY EXAM CHEST 1 VIEW: CPT

## 2021-02-03 RX ORDER — CIPROFLOXACIN 500 MG/1
500 TABLET, FILM COATED ORAL 2 TIMES DAILY
Qty: 14 TABLET | Refills: 0 | Status: SHIPPED | OUTPATIENT
Start: 2021-02-03 | End: 2021-02-10

## 2021-02-03 RX ORDER — CEFTRIAXONE 1 G/1
1000 INJECTION, POWDER, FOR SOLUTION INTRAMUSCULAR; INTRAVENOUS ONCE
Status: COMPLETED | OUTPATIENT
Start: 2021-02-03 | End: 2021-02-03

## 2021-02-03 RX ADMIN — IOPAMIDOL 100 ML: 755 INJECTION, SOLUTION INTRAVENOUS at 21:18

## 2021-02-03 RX ADMIN — CEFTRIAXONE 1000 MG: 1 INJECTION, POWDER, FOR SOLUTION INTRAMUSCULAR; INTRAVENOUS at 23:14

## 2021-02-03 ASSESSMENT — ENCOUNTER SYMPTOMS
SHORTNESS OF BREATH: 0
EYE REDNESS: 0
SORE THROAT: 0
EYE DISCHARGE: 0
BACK PAIN: 0
ABDOMINAL PAIN: 1
NAUSEA: 0
VOMITING: 0
COUGH: 0

## 2021-02-04 PROCEDURE — 93010 ELECTROCARDIOGRAM REPORT: CPT | Performed by: INTERNAL MEDICINE

## 2021-02-04 NOTE — ED NOTES
[facility] called to transport patient back to [facility] facility, Teri Cody at [employer], RN  02/03/21 4434

## 2021-02-04 NOTE — ED PROVIDER NOTES
[facility] ED  EMERGENCY DEPARTMENT ENCOUNTER      Pt Name: Carisa Karimi  MRN: [id number]  [place] 1940  Date of evaluation: 2/3/2021  Provider: Christel Huggins DO        HISTORY OF PRESENT ILLNESS    Carisa aKrimi is a 80 y.o. female per chart review has ah/o bowel obstruction, pneumonia, seizure, blind, rhinitis, seizure, osteoporosis. She was sent in by her facility for [facility]. The history is provided by the nursing home and the EMS personnel. The history is limited by a developmental delay. Dysuria   This is a new problem. The current episode started less than 1 hour ago. The problem occurs intermittently. The problem has not changed since onset. The quality of the pain is described as burning. There has been no fever. Associated symptoms include hematuria. Pertinent negatives include no chills, no nausea and no vomiting. She has tried nothing for the symptoms. REVIEW OF SYSTEMS       Review of Systems   Unable to perform ROS: Patient nonverbal   Constitutional: Negative for chills and fever. HENT: Negative for ear pain and sore throat. Eyes: Negative for discharge and redness. Respiratory: Negative for cough and shortness of breath. Cardiovascular: Negative for chest pain and palpitations. Gastrointestinal: Positive for abdominal pain. Negative for nausea and vomiting. Genitourinary: Positive for dysuria and hematuria. Negative for difficulty urinating. Musculoskeletal: Negative for back pain and neck pain. Skin: Negative for rash and wound. Neurological: Negative for dizziness and syncope. Psychiatric/Behavioral: Negative for confusion. The patient is not nervous/anxious. All other systems reviewed and are negative. Except as noted above the remainder of the review of systems was reviewed and negative.        PAST MEDICAL HISTORY     Past Medical History:   Diagnosis Date    Ceruminosis     Chronic rhinitis     Dyslipidemia     History daily. Indications: Allergic Rhinitis    HYDROCORTISONE (PREPARATION H) 1 % CREAM    Apply topically 2 times daily Apply topically to hemorrhoids 3 times daily. IBUPROFEN (ADVIL;MOTRIN) 200 MG TABLET    Take 200 mg by mouth every 6 hours as needed for Pain Two tablets every 6 hours as needed. LACTULOSE ENCEPHALOPATHY 10 GM/15ML SOLN SOLUTION    Take 10 g by mouth daily Give 15 ml by mouth once daily    MAGNESIUM HYDROXIDE (MILK OF MAGNESIA PO)    Take 30 mLs by mouth daily as needed. Indications: Constipation    MULTIPLE VITAMIN (MULTIVITAMIN PO)    Take 1 tablet by mouth daily. Indications: multivitamin    MUPIROCIN (BACTROBAN) 2 % OINTMENT    Indications: History MRSA. Apply INTRANASALLY BID. RUB NARES TOGETHER. NEOMYCIN-BACITRACIN-POLYMYXIN (NEOSPORIN) 5-400-5000 OINTMENT    Apply topically 4 times daily Apply topically 4 times daily. NIACIN (NIASPAN) 500 MG CR TABLET    Take 500 mg by mouth daily Indications: Dyslipidemia     OXYGEN    Inhale 2 L/min into the lungs as needed. ADMINISTER VIA N/C.  TITRATE PRN TO MAINTAIN PULSE OX ABOVE 92%. Indications: Hypoxia    POLYVINYL ALCOHOL-POVIDONE (REFRESH OP)    Apply to eye One drop each eye four times a day    PROBIOTIC PRODUCT (PROBIOTIC PO)    Take 1 capsule by mouth 2 times daily as needed     PROTEIN POWD    Take by mouth Give 1 teaspoon (5ml) in water/juice BID    PSEUDOEPHEDRINE-CODEINE-GUAIFENESIN (MYTUSSIN DAC) 30- MG/5ML SOLUTION    Take 10 mLs by mouth 4 times daily as needed for Cough    PSYLLIUM (REGULOID) 58.6 % POWDER    Take 1 packet by mouth 3 times daily Take by mouth 3 times daily. QUETIAPINE (SEROQUEL) 100 MG TABLET    Take 100 mg by mouth 3 times daily    SIMVASTATIN (ZOCOR) 20 MG TABLET    Take 20 mg by mouth nightly    SODIUM CHLORIDE (OCEAN, BABY AYR) 0.65 % NASAL SPRAY    1 spray by Nasal route as needed for Congestion    SODIUM PHOSPHATES (FLEET)    Place 1 enema rectally daily as needed.  Indications: Constipation SUNSCREENS (CHAPSTICK) [address]    Apply topically as needed    VITAMIN D (ERGOCALCIFEROL) 35839 UNITS CAPS CAPSULE    Take 50,000 Units by mouth once a week Indications: Vitamin D Deficiency On Mondays       ALLERGIES     Patient has no known allergies. FAMILY HISTORY     No family history on file. SOCIAL HISTORY       Social History     Socioeconomic History    Marital status: Single     Spouse name: Not on file    Number of children: Not on file    Years of education: Not on file    Highest education level: Not on file   Occupational History    Not on file   Social Needs    Financial resource strain: Not on file    Food insecurity     Worry: Not on file     Inability: Not on file    Transportation needs     Medical: Not on file     Non-medical: Not on file   Tobacco Use    Smoking status: Never Smoker    Smokeless tobacco: Never Used   Substance and Sexual Activity    Alcohol use: No    Drug use: No    Sexual activity: Never   Lifestyle    Physical activity     Days per week: Not on file     Minutes per session: Not on file    Stress: Not on file   Relationships    Social connections     Talks on phone: Not on file     Gets together: Not on file     Attends [employer] service: Not on file     Active member of club or organization: Not on file     Attends meetings of clubs or organizations: Not on file     Relationship status: Not on file    Intimate partner violence     Fear of current or ex partner: Not on file     Emotionally abused: Not on file     Physically abused: Not on file     Forced sexual activity: Not on file   Other Topics Concern    Not on file   Social History Narrative    ** Merged History Encounter **              PHYSICAL EXAM       ED Triage Vitals [02/03/21 2018]   BP Temp Temp Source Pulse Resp SpO2 Height Weight   (!) 128/59 97.8 °F (36.6 °C) Temporal 90 20 96 % -- --       Physical Exam  Vitals signs and nursing note reviewed.    Constitutional:       Appearance: Normal appearance. HENT:      Head: Normocephalic and atraumatic. Right Ear: Tympanic membrane normal.      Left Ear: Tympanic membrane normal.      Nose: Nose normal.      Mouth/Throat:      Mouth: Mucous membranes are moist.      Pharynx: Oropharynx is clear. Eyes:      General: Lids are normal.      Extraocular Movements: Extraocular movements intact. Conjunctiva/sclera: Conjunctivae normal.      Pupils: Pupils are equal, round, and reactive to light. Neck:      Musculoskeletal: Full passive range of motion without pain, normal range of motion and neck supple. Cardiovascular:      Rate and Rhythm: Normal rate and regular rhythm. Pulses: Normal pulses. Heart sounds: Normal heart sounds. Pulmonary:      Effort: Pulmonary effort is normal.      Breath sounds: Normal breath sounds. Abdominal:      General: Abdomen is flat. Bowel sounds are normal.      Palpations: Abdomen is soft. Tenderness: There is abdominal tenderness. Musculoskeletal: Normal range of motion. Skin:     General: Skin is warm. Capillary Refill: Capillary refill takes less than 2 seconds. Neurological:      General: No focal deficit present. Mental Status: She is alert and oriented to person, place, and time. Deep Tendon Reflexes: Reflexes are normal and symmetric. Psychiatric:         Attention and Perception: Attention and perception normal.         Mood and Affect: Mood normal.         Behavior: Behavior normal. Behavior is cooperative.            LABS:  Labs Reviewed   URINE RT REFLEX TO CULTURE - Abnormal; Notable for the following components:       Result Value    Ketones, Urine 15 (*)     Protein, UA >=300 (*)     Urobilinogen, Urine 2.0 (*)     All other components within normal limits   BASIC METABOLIC PANEL - Abnormal; Notable for the following components:    Glucose 180 (*)     All other components within normal limits   CBC WITH AUTO DIFFERENTIAL - Abnormal; Notable for the following components:    RBC 3.62 (*)     Hematocrit 36.2 (*)     MCH 33.3 (*)     Monocytes Absolute 1.1 (*)     All other components within normal limits   MICROSCOPIC URINALYSIS - Abnormal; Notable for the following components:    RBC, UA >100 (*)     Bacteria, UA MODERATE (*)     All other components within normal limits   CULTURE, URINE         MDM:   Vitals:    Vitals:    02/03/21 2018   BP: (!) 128/59   Pulse: 90   Resp: 20   Temp: 97.8 °F (36.6 °C)   TempSrc: Temporal   SpO2: 96%       MDM  Number of Diagnoses or Management Options  Infective urethritis  Diagnosis management comments: Patient presents with hematuria. Labs, CT, IV and EKG ordered. CXR results no acute changes  CT ab/pelvis-no acute changes Her CBC and BMP do not show any acute changes. UA: positive for a UTI   The patient was given Rocephin 1g IM. I wrote Rx for Cipro 500mg PO bid for 7 days. She will be discharged home to follow up in 2 days with her primary care doctor. Amount and/or Complexity of Data Reviewed  Clinical lab tests: reviewed         EKG Interpretation    Interpreted by emergency department physician    Rhythm: normal sinus   Rate: normal  Axis: normal  Ectopy: none  Conduction: normal  ST Segments: no acute change  T Waves: no acute change  Q Waves: nonspecific    Clinical Impression: non-specific EKG    ANGELITA HOLT     The lab results, radiology and test results were reviewed with the patient and family. The patient will follow up in 2 days with their primary care doctor. If their symptoms change or get worse they will return to the ER. CRITICAL CARE TIME   Total CriticalCare time was 0 minutes, excluding separately reportable procedures. There was a high probability of clinically significant/life threatening deterioration in the patient's condition which required my urgent intervention. PROCEDURES:  Unlessotherwise noted below, none     Procedures      FINAL IMPRESSION      1.  Infective urethritis

## 2021-02-04 NOTE — ED NOTES
[facility] here to transport patient back to [facility], [address] [place] [address]  02/03/21 2538

## 2021-02-07 LAB
ORGANISM: ABNORMAL
URINE CULTURE, ROUTINE: ABNORMAL

## 2021-07-19 ENCOUNTER — VIRTUAL VISIT (OUTPATIENT)
Dept: INTERNAL MEDICINE | Age: 81
End: 2021-07-19
Payer: MEDICARE

## 2021-07-19 DIAGNOSIS — Z00.00 ROUTINE GENERAL MEDICAL EXAMINATION AT A HEALTH CARE FACILITY: Primary | ICD-10-CM

## 2021-07-19 PROCEDURE — G0439 PPPS, SUBSEQ VISIT: HCPCS | Performed by: FAMILY MEDICINE

## 2021-07-19 PROCEDURE — 1123F ACP DISCUSS/DSCN MKR DOCD: CPT | Performed by: FAMILY MEDICINE

## 2021-07-19 PROCEDURE — 4040F PNEUMOC VAC/ADMIN/RCVD: CPT | Performed by: FAMILY MEDICINE

## 2021-07-19 RX ORDER — IBUPROFEN 200 MG
200 TABLET ORAL
Status: ON HOLD | COMMUNITY
End: 2022-10-19 | Stop reason: HOSPADM

## 2021-07-19 SDOH — ECONOMIC STABILITY: FOOD INSECURITY: WITHIN THE PAST 12 MONTHS, THE FOOD YOU BOUGHT JUST DIDN'T LAST AND YOU DIDN'T HAVE MONEY TO GET MORE.: NEVER TRUE

## 2021-07-19 SDOH — ECONOMIC STABILITY: FOOD INSECURITY: WITHIN THE PAST 12 MONTHS, YOU WORRIED THAT YOUR FOOD WOULD RUN OUT BEFORE YOU GOT MONEY TO BUY MORE.: NEVER TRUE

## 2021-07-19 ASSESSMENT — PATIENT HEALTH QUESTIONNAIRE - PHQ9
2. FEELING DOWN, DEPRESSED OR HOPELESS: 0
SUM OF ALL RESPONSES TO PHQ QUESTIONS 1-9: 0
1. LITTLE INTEREST OR PLEASURE IN DOING THINGS: 0
SUM OF ALL RESPONSES TO PHQ QUESTIONS 1-9: 0
SUM OF ALL RESPONSES TO PHQ QUESTIONS 1-9: 0
SUM OF ALL RESPONSES TO PHQ9 QUESTIONS 1 & 2: 0

## 2021-07-19 ASSESSMENT — SOCIAL DETERMINANTS OF HEALTH (SDOH): HOW HARD IS IT FOR YOU TO PAY FOR THE VERY BASICS LIKE FOOD, HOUSING, MEDICAL CARE, AND HEATING?: NOT HARD AT ALL

## 2021-07-19 ASSESSMENT — LIFESTYLE VARIABLES: HOW OFTEN DO YOU HAVE A DRINK CONTAINING ALCOHOL: 0

## 2021-07-19 NOTE — PATIENT INSTRUCTIONS
Personalized Preventive Plan for Rayne Lutz - 7/19/2021  Medicare offers a range of preventive health benefits. Some of the tests and screenings are paid in full while other may be subject to a deductible, co-insurance, and/or copay. Some of these benefits include a comprehensive review of your medical history including lifestyle, illnesses that may run in your family, and various assessments and screenings as appropriate. After reviewing your medical record and screening and assessments performed today your provider may have ordered immunizations, labs, imaging, and/or referrals for you. A list of these orders (if applicable) as well as your Preventive Care list are included within your After Visit Summary for your review. Other Preventive Recommendations:    · A preventive eye exam performed by an eye specialist is recommended every 1-2 years to screen for glaucoma; cataracts, macular degeneration, and other eye disorders. · A preventive dental visit is recommended every 6 months. · Try to get at least 150 minutes of exercise per week or 10,000 steps per day on a pedometer . · Order or download the FREE \"Exercise & Physical Activity: Your Everyday Guide\" from [employer]. Call 3-722.294.2191 or search [employer] online. · You need 4056-0500 mg of calcium and 9898-2032 IU of vitamin D per day. It is possible to meet your calcium requirement with diet alone, but a vitamin D supplement is usually necessary to meet this goal.  · When exposed to the sun, use a sunscreen that protects against both UVA and UVB radiation with an SPF of 30 or greater. Reapply every 2 to 3 hours or after sweating, drying off with a towel, or swimming. · Always wear a seat belt when traveling in a car. Always wear a helmet when riding a bicycle or motorcycle.

## 2021-07-19 NOTE — PROGRESS NOTES
Medicare Annual Wellness Visit  Name: Skyler Paula Date: 2021   MRN: [id number] Sex: Female   Age: 80 y.o. Ethnicity: Non-/Non    : 1940 Race: Melia Gibson is here for [employer] for behavioral, psychosocial and functional/safety risks, and cognitive dysfunction are all negative except as indicated below. These results, as well as other patient data from the [address] form, are documented in Flowsheets linked to this Encounter. No Known Allergies    Prior to Visit Medications    Medication Sig Taking? Authorizing Provider   zinc oxide 13 % CREA Apply topically 2 times daily as needed for Rash  Historical Provider, MD   hydrocortisone (PREPARATION H) 1 % cream Apply topically 2 times daily Apply topically to hemorrhoids 3 times daily. Historical Provider, MD   simvastatin (ZOCOR) 20 MG tablet Take 20 mg by mouth nightly  Historical Provider, MD   Polyvinyl Alcohol-Povidone (REFRESH OP) Apply to eye One drop each eye four times a day  Historical Provider, MD   albuterol (PROVENTIL) (2.5 MG/3ML) 0.083% nebulizer solution Take 2.5 mg by nebulization every 6 hours as needed for Wheezing  Historical Provider, MD   atropine 1 % ophthalmic solution Place 1 drop into the left eye daily  Historical Provider, MD   chlorhexidine (PERIDEX) 0.12 % solution Take 15 mLs by mouth 2 times daily Apply to gums. Historical Provider, MD   Protein POWD Take by mouth Give 1 teaspoon (5ml) in water/juice BID  Historical Provider, MD   mupirocin (BACTROBAN) 2 % ointment Indications: History MRSA. Apply INTRANASALLY BID. RUB NARES TOGETHER.   Arpita Elder MD   busPIRone (BUSPAR) 30 MG tablet Take 30 mg by mouth 2 times daily  Historical Provider, MD   QUEtiapine (SEROQUEL) 100 MG tablet Take 100 mg by mouth 3 times daily  Historical Provider, MD   neomycin-bacitracin-polymyxin (NEOSPORIN) 5-400-5000 ointment Apply topically 4 times daily Apply topically 4 times daily. Historical Provider, MD   clonazePAM (KLONOPIN) 1 MG tablet Take 1 tablet by mouth 3 times daily for 150 days. Jerry Cardona MD   Sunscreens ([address] Apply topically as needed  Historical Provider, MD   aspirin 81 MG tablet Take 81 mg by mouth daily  Historical Provider, MD   divalproex (DEPAKOTE) 250 MG DR tablet Take 500 mg by mouth 2 times daily   Historical Provider, MD   lactulose encephalopathy 10 GM/15ML SOLN solution Take 10 g by mouth daily Give 15 ml by mouth once daily  Historical Provider, MD   psyllium (REGULOID) 58.6 % powder Take 1 packet by mouth 3 times daily Take by mouth 3 times daily. Historical Provider, MD   sodium chloride (OCEAN, BABY AYR) 0.65 % nasal spray 1 spray by Nasal route as needed for Congestion  Historical Provider, MD   carbamide peroxide (DEBROX) 6.5 % otic solution 4 drops 2 times daily Instill 4 drops to affected ears twice daily for 4 days followed by flush on day 5 as needed for cerumen obstruction  Historical Provider, MD   ibuprofen (ADVIL;MOTRIN) 200 MG tablet Take 200 mg by mouth every 6 hours as needed for Pain Two tablets every 6 hours as needed. Historical Provider, MD   Probiotic Product (PROBIOTIC PO) Take 1 capsule by mouth 2 times daily as needed   Historical Provider, MD   pseudoephedrine-codeine-guaifenesin (MYTUSSIN DAC) 30- MG/5ML solution Take 10 mLs by mouth 4 times daily as needed for Cough  Historical Provider, MD   Sodium Phosphates (FLEET) Place 1 enema rectally daily as needed. Indications: Constipation  Historical Provider, MD   OXYGEN Inhale 2 L/min into the lungs as needed. ADMINISTER VIA N/C.  TITRATE PRN TO MAINTAIN PULSE OX ABOVE 92%. Indications: Hypoxia  Historical Provider, MD   Cetirizine HCl 10 MG CAPS Take 10 mg by mouth daily.   EDDIE [place]   niacin (NIASPAN) 500 MG CR tablet Take 500 mg by mouth daily Indications: Dyslipidemia   Historical Provider, MD   acetaminophen (TYLENOL) 325 MG tablet Take 650 mg by mouth every 4 hours as needed. May give rectal if unable to tolerate PO administration. Indications: Fever, Pain  Historical Provider, MD   Magnesium Hydroxide (MILK OF MAGNESIA PO) Take 30 mLs by mouth daily as needed. Indications: Constipation  Historical Provider, MD   docusate sodium (COLACE) 100 MG capsule Take 100 mg by mouth daily Indications: Chronic Constipation Give 2 capsules daily. Hold for loose stools. Historical Provider, MD   fluticasone (FLONASE) 50 MCG/ACT nasal spray 1 spray by Nasal route 2 times daily. Indications: Allergic Rhinitis  Historical Provider, MD   Multiple Vitamin (MULTIVITAMIN PO) Take 1 tablet by mouth daily. Indications: multivitamin  Historical Provider, MD   calcium-vitamin D (OYSTER CALCIUM 500 + D) 500-200 MG-UNIT per tablet Take 1 tablet by mouth 2 times daily. Administer with food. Indications: supplement  Historical Provider, MD   vitamin D (ERGOCALCIFEROL) 08159 UNITS CAPS capsule Take 50,000 Units by mouth once a week Indications: Vitamin D Deficiency On Mondays  Historical Provider, MD       Past Medical History:   Diagnosis Date    Ceruminosis     Chronic rhinitis     Dyslipidemia     History of encephalitis     Large bowel obstruction ([address]     Legally blind     [facility] disorder [facility]     MR (mental retardation)     Osteoporosis     Periodontal disease     Pneumonia 12/9/2018    SBO (small bowel obstruction) ([facility] 3/22/2019    Seizure disorder ([facility]     Vitamin D deficiency        Past Surgical History:   Procedure Laterality Date    COLONOSCOPY N/A 3/22/2019    COL[facility] Riley Peter MD [facility]       No family history on file.     CareTeam (Including outside providers/suppliers regularly involved in providing care):   Patient Care Team:  Talib Milton MD as PCP - General (Family Medicine)  Talib Milton MD as PCP [facility] Empaneled Provider    Wt Readings from Last 3 Encounters:   11/05/20 168 lb (76.2 kg) 11/04/20 128 lb (58.1 kg)   10/09/19 171 lb 6.4 oz (77.7 kg)     Patient-Reported Vitals 7/19/2021   Patient-Reported Weight 174.1lb   Patient-Reported Systolic 058   Patient-Reported Diastolic 63   Patient-Reported Pulse 80   Patient-Reported Temperature 97.8   Patient-Reported SpO2 93      There is no height or weight on file to calculate BMI. Based upon direct observation of the patient, evaluation of cognition reveals global memory impairment noted. Patient's complete Health Risk Assessment and screening values have been reviewed and are found in Flowsheets. The following problems were reviewed today and where indicated follow up appointments were made and/or referrals ordered. Positive Risk Factor Screenings with Interventions:          General Health and ACP:  General  In general, how would you say your health is?: Good  In the past 7 days, have you experienced any of the following?  New or Increased Pain, New or Increased Fatigue, Loneliness, Social Isolation, Stress or Anger?: None of These  Do you get the social and emotional support that you need?: Yes  Do you have a Living Will?: (!) No  Advance Directives     Power of [address] [place] [address] Will ACP-Advance Directive ACP-Power of     Not on File Not on File Not on File Not on File      General Health Risk Interventions:  · No Living Will: she is full code    Health Habits/Nutrition:  Health Habits/Nutrition  Do you exercise for at least 20 minutes 2-3 times per week?: (!) No  Have you lost any weight without trying in the past 3 months?: No  Do you eat only one meal per day?: No  Have you seen the dentist within the past year?: Yes     Health Habits/Nutrition Interventions:  · Inadequate physical activity:  patient is not ready to increase his/her physical activity level at this time    Hearing/Vision:  No exam data present  Hearing/Vision  Do you or your family notice any trouble with your hearing that hasn't been managed with hearing has also been advised to contact this office for worsening conditions or problems, and seek emergency medical treatment and/or call 911 if deemed necessary. Patient identification was verified at the start of the visit: Yes    Services were provided through a video synchronous discussion virtually to substitute for in-person clinic visit. Patient and provider were located at their individual homes. --Katrina Carlton MD on 7/19/2021 at 12:59 PM    An electronic signature was used to authenticate this note.

## 2021-08-06 ENCOUNTER — TELEPHONE (OUTPATIENT)
Dept: INTERNAL MEDICINE | Age: 81
End: 2021-08-06

## 2021-08-06 DIAGNOSIS — R09.02 OXYGEN DESATURATION: Primary | ICD-10-CM

## 2021-08-06 NOTE — TELEPHONE ENCOUNTER
Yesterday staff reported that pt was not breathing well, pt given breathing treatment, and her oxygen came back up. Before time for next for breathing treatment her oxygen dropped again to 87-88. No fever or any other issues. Vitals are stable. Would like chest xray order to have done in house.

## 2021-08-10 ENCOUNTER — TELEPHONE (OUTPATIENT)
Dept: INTERNAL MEDICINE | Age: 81
End: 2021-08-10

## 2021-08-10 DIAGNOSIS — J18.9 PNEUMONIA OF LEFT LOWER LOBE DUE TO INFECTIOUS ORGANISM: Primary | ICD-10-CM

## 2021-08-10 RX ORDER — AZITHROMYCIN 250 MG/1
250 TABLET, FILM COATED ORAL SEE ADMIN INSTRUCTIONS
Qty: 6 TABLET | Refills: 0 | Status: SHIPPED | OUTPATIENT
Start: 2021-08-10 | End: 2021-08-15

## 2021-08-10 NOTE — TELEPHONE ENCOUNTER
Chest xray came back. Shows mild left basilar air space disease, concerning for pneumonia. No wheezing, sats above 92 now. Anna Maravilla will be faxing the results to the office.

## 2022-10-15 ENCOUNTER — APPOINTMENT (OUTPATIENT)
Dept: CT IMAGING | Age: 82
DRG: 177 | End: 2022-10-15
Payer: MEDICARE

## 2022-10-15 ENCOUNTER — APPOINTMENT (OUTPATIENT)
Dept: GENERAL RADIOLOGY | Age: 82
DRG: 177 | End: 2022-10-15
Payer: MEDICARE

## 2022-10-15 ENCOUNTER — HOSPITAL ENCOUNTER (INPATIENT)
Age: 82
LOS: 2 days | Discharge: HOME | DRG: 177 | End: 2022-10-19
Attending: EMERGENCY MEDICINE | Admitting: INTERNAL MEDICINE
Payer: MEDICARE

## 2022-10-15 DIAGNOSIS — G40.909 SEIZURE DISORDER (HCC): ICD-10-CM

## 2022-10-15 DIAGNOSIS — A41.9 SEPTICEMIA (HCC): Primary | ICD-10-CM

## 2022-10-15 LAB
ALBUMIN SERPL-MCNC: 4.2 G/DL (ref 3.5–4.6)
ALP BLD-CCNC: 82 U/L (ref 40–130)
ALT SERPL-CCNC: 19 U/L (ref 0–33)
AMORPHOUS: NORMAL
ANION GAP SERPL CALCULATED.3IONS-SCNC: 14 MEQ/L (ref 9–15)
AST SERPL-CCNC: 31 U/L (ref 0–35)
BACTERIA: NEGATIVE /HPF
BASOPHILS ABSOLUTE: 0 K/UL (ref 0–0.1)
BASOPHILS RELATIVE PERCENT: 0.3 % (ref 0.1–1.2)
BILIRUB SERPL-MCNC: 0.4 MG/DL (ref 0.2–0.7)
BILIRUBIN URINE: NEGATIVE
BLOOD, URINE: NEGATIVE
BUN BLDV-MCNC: 13 MG/DL (ref 8–23)
CALCIUM SERPL-MCNC: 9.3 MG/DL (ref 8.5–9.9)
CHLORIDE BLD-SCNC: 103 MEQ/L (ref 95–107)
CLARITY: CLEAR
CO2: 27 MEQ/L (ref 20–31)
COLOR: YELLOW
CREAT SERPL-MCNC: 0.65 MG/DL (ref 0.5–0.9)
D DIMER: >20 MG/L FEU (ref 0–0.5)
EOSINOPHILS ABSOLUTE: 0 K/UL (ref 0–0.4)
EOSINOPHILS RELATIVE PERCENT: 0.1 % (ref 0.7–5.8)
EPITHELIAL CELLS, UA: NORMAL /HPF
GFR AFRICAN AMERICAN: >60
GFR NON-AFRICAN AMERICAN: >60
GLOBULIN: 2.5 G/DL (ref 2.3–3.5)
GLUCOSE BLD-MCNC: 172 MG/DL (ref 70–99)
GLUCOSE URINE: NEGATIVE MG/DL
HCT VFR BLD CALC: 43.6 % (ref 37–47)
HEMOGLOBIN: 15 G/DL (ref 11.2–15.7)
IMMATURE GRANULOCYTES #: 0.1 K/UL
IMMATURE GRANULOCYTES %: 0.6 %
INFLUENZA A BY PCR: NEGATIVE
INFLUENZA B BY PCR: NEGATIVE
KETONES, URINE: 15 MG/DL
LACTIC ACID: 4.2 MMOL/L (ref 0.5–2.2)
LACTIC ACID: 5.1 MMOL/L (ref 0.5–2.2)
LEUKOCYTE ESTERASE, URINE: NEGATIVE
LYMPHOCYTES ABSOLUTE: 1.3 K/UL (ref 1.2–3.7)
LYMPHOCYTES RELATIVE PERCENT: 9.2 %
MAGNESIUM: 1.9 MG/DL (ref 1.7–2.4)
MCH RBC QN AUTO: 33.5 PG (ref 25.6–32.2)
MCHC RBC AUTO-ENTMCNC: 34.4 % (ref 32.2–35.5)
MCV RBC AUTO: 97.3 FL (ref 79.4–94.8)
MONOCYTES ABSOLUTE: 1.2 K/UL (ref 0.2–0.9)
MONOCYTES RELATIVE PERCENT: 8.9 % (ref 4.7–12.5)
NEUTROPHILS ABSOLUTE: 11.2 K/UL (ref 1.6–6.1)
NEUTROPHILS RELATIVE PERCENT: 80.9 % (ref 34–71.1)
NITRITE, URINE: NEGATIVE
PDW BLD-RTO: 12 % (ref 11.7–14.4)
PH UA: 6 (ref 5–9)
PLATELET # BLD: 159 K/UL (ref 182–369)
POTASSIUM SERPL-SCNC: 3.5 MEQ/L (ref 3.4–4.9)
PRO-BNP: 126 PG/ML
PROTEIN UA: 30 MG/DL
RBC # BLD: 4.48 M/UL (ref 3.93–5.22)
RBC UA: NORMAL /HPF (ref 0–2)
RSV BY PCR: NEGATIVE
SARS-COV-2, NAAT: NOT DETECTED
SODIUM BLD-SCNC: 144 MEQ/L (ref 135–144)
SPECIFIC GRAVITY UA: 1.02 (ref 1–1.03)
TOTAL PROTEIN: 6.7 G/DL (ref 6.3–8)
TROPONIN: <0.01 NG/ML (ref 0–0.01)
URINE REFLEX TO CULTURE: ABNORMAL
UROBILINOGEN, URINE: 0.2 E.U./DL
WBC # BLD: 13.9 K/UL (ref 4–10)
WBC UA: NORMAL /HPF (ref 0–5)

## 2022-10-15 PROCEDURE — 96365 THER/PROPH/DIAG IV INF INIT: CPT

## 2022-10-15 PROCEDURE — 85379 FIBRIN DEGRADATION QUANT: CPT

## 2022-10-15 PROCEDURE — 80053 COMPREHEN METABOLIC PANEL: CPT

## 2022-10-15 PROCEDURE — 87502 INFLUENZA DNA AMP PROBE: CPT

## 2022-10-15 PROCEDURE — 84484 ASSAY OF TROPONIN QUANT: CPT

## 2022-10-15 PROCEDURE — 81001 URINALYSIS AUTO W/SCOPE: CPT

## 2022-10-15 PROCEDURE — 87040 BLOOD CULTURE FOR BACTERIA: CPT

## 2022-10-15 PROCEDURE — 6370000000 HC RX 637 (ALT 250 FOR IP): Performed by: EMERGENCY MEDICINE

## 2022-10-15 PROCEDURE — 99285 EMERGENCY DEPT VISIT HI MDM: CPT

## 2022-10-15 PROCEDURE — 6360000002 HC RX W HCPCS: Performed by: EMERGENCY MEDICINE

## 2022-10-15 PROCEDURE — 87634 RSV DNA/RNA AMP PROBE: CPT

## 2022-10-15 PROCEDURE — 87635 SARS-COV-2 COVID-19 AMP PRB: CPT

## 2022-10-15 PROCEDURE — 36415 COLL VENOUS BLD VENIPUNCTURE: CPT

## 2022-10-15 PROCEDURE — 83880 ASSAY OF NATRIURETIC PEPTIDE: CPT

## 2022-10-15 PROCEDURE — 71045 X-RAY EXAM CHEST 1 VIEW: CPT

## 2022-10-15 PROCEDURE — 96366 THER/PROPH/DIAG IV INF ADDON: CPT

## 2022-10-15 PROCEDURE — 83735 ASSAY OF MAGNESIUM: CPT

## 2022-10-15 PROCEDURE — 85025 COMPLETE CBC W/AUTO DIFF WBC: CPT

## 2022-10-15 PROCEDURE — 83605 ASSAY OF LACTIC ACID: CPT

## 2022-10-15 PROCEDURE — 93005 ELECTROCARDIOGRAM TRACING: CPT

## 2022-10-15 PROCEDURE — 74177 CT ABD & PELVIS W/CONTRAST: CPT

## 2022-10-15 PROCEDURE — 6360000004 HC RX CONTRAST MEDICATION: Performed by: EMERGENCY MEDICINE

## 2022-10-15 PROCEDURE — 71275 CT ANGIOGRAPHY CHEST: CPT

## 2022-10-15 PROCEDURE — 2580000003 HC RX 258: Performed by: EMERGENCY MEDICINE

## 2022-10-15 RX ORDER — LACTULOSE 10 G/15ML
SOLUTION ORAL
Status: ON HOLD | COMMUNITY
Start: 2022-09-23 | End: 2022-10-19 | Stop reason: HOSPADM

## 2022-10-15 RX ORDER — 0.9 % SODIUM CHLORIDE 0.9 %
1000 INTRAVENOUS SOLUTION INTRAVENOUS ONCE
Status: COMPLETED | OUTPATIENT
Start: 2022-10-15 | End: 2022-10-15

## 2022-10-15 RX ORDER — LEVOFLOXACIN 5 MG/ML
750 INJECTION, SOLUTION INTRAVENOUS ONCE
Status: COMPLETED | OUTPATIENT
Start: 2022-10-15 | End: 2022-10-15

## 2022-10-15 RX ORDER — ACETAMINOPHEN 500 MG
1000 TABLET ORAL ONCE
Status: COMPLETED | OUTPATIENT
Start: 2022-10-15 | End: 2022-10-15

## 2022-10-15 RX ORDER — 0.9 % SODIUM CHLORIDE 0.9 %
500 INTRAVENOUS SOLUTION INTRAVENOUS ONCE
Status: COMPLETED | OUTPATIENT
Start: 2022-10-15 | End: 2022-10-15

## 2022-10-15 RX ORDER — DIVALPROEX SODIUM 500 MG/1
TABLET, EXTENDED RELEASE ORAL
Status: ON HOLD | COMMUNITY
Start: 2022-09-23 | End: 2022-10-19 | Stop reason: HOSPADM

## 2022-10-15 RX ORDER — ATORVASTATIN CALCIUM 40 MG/1
TABLET, FILM COATED ORAL
Status: ON HOLD | COMMUNITY
Start: 2022-09-23 | End: 2022-10-19 | Stop reason: HOSPADM

## 2022-10-15 RX ADMIN — ACETAMINOPHEN 1000 MG: 500 TABLET ORAL at 20:54

## 2022-10-15 RX ADMIN — IOPAMIDOL 100 ML: 755 INJECTION, SOLUTION INTRAVENOUS at 21:37

## 2022-10-15 RX ADMIN — LEVOFLOXACIN 750 MG: 750 INJECTION, SOLUTION INTRAVENOUS at 20:55

## 2022-10-15 RX ADMIN — SODIUM CHLORIDE 1000 ML: 9 INJECTION, SOLUTION INTRAVENOUS at 20:55

## 2022-10-15 RX ADMIN — SODIUM CHLORIDE 500 ML: 9 INJECTION, SOLUTION INTRAVENOUS at 20:07

## 2022-10-15 ASSESSMENT — ENCOUNTER SYMPTOMS
DIARRHEA: 0
SINUS PRESSURE: 0
WHEEZING: 0
ABDOMINAL DISTENTION: 0
COLOR CHANGE: 0
EYE PAIN: 0
CONSTIPATION: 0
NAUSEA: 0
ABDOMINAL PAIN: 0
SORE THROAT: 0
BACK PAIN: 0
SHORTNESS OF BREATH: 0
APNEA: 0
RHINORRHEA: 0
COUGH: 0
PHOTOPHOBIA: 0
VOICE CHANGE: 0
VOMITING: 0

## 2022-10-15 ASSESSMENT — PAIN - FUNCTIONAL ASSESSMENT: PAIN_FUNCTIONAL_ASSESSMENT: ADULT NONVERBAL PAIN SCALE (NPVS)

## 2022-10-15 NOTE — ED TRIAGE NOTES
Pt. Presents to ED via EMS with complaints of fall and low pulse ox in upper 80's. On arrvial pulse ox 92% on RA.

## 2022-10-15 NOTE — ED PROVIDER NOTES
Genitourinary:  Negative for dysuria, flank pain, frequency and urgency. Musculoskeletal:  Positive for gait problem. Negative for arthralgias, back pain, myalgias and neck stiffness. Skin:  Negative for color change, pallor and rash. Allergic/Immunologic: Negative for food allergies and immunocompromised state. Neurological:  Negative for dizziness, tremors, syncope, weakness, light-headedness and headaches. Hematological:  Negative for adenopathy. Does not bruise/bleed easily. Psychiatric/Behavioral:  Negative for agitation, confusion and hallucinations. All other systems reviewed and are negative. Except as noted above the remainder of the review of systems was reviewed and negative. PAST MEDICAL HISTORY     Past Medical History:   Diagnosis Date    Ceruminosis     Chronic rhinitis     Dyslipidemia     History of encephalitis     Large bowel obstruction (HCC)     Legally blind     Mood disorder ([facility] [address]     MR (mental retardation)     Osteoporosis     Periodontal disease     Pneumonia 12/9/2018    SBO (small bowel obstruction) ([facility] [address] 3/22/2019    Seizure disorder ([facility] [address]     Vitamin D deficiency          SURGICAL HISTORY       Past Surgical History:   Procedure Laterality Date    COLONOSCOPY N/A 3/22/2019    COLONOSCOPY performed by Jasper Gomez MD at [address] [place] [address] Medication List as of 10/19/2022  1:44 PM        CONTINUE these medications which have NOT CHANGED    Details   albuterol (PROVENTIL) (2.5 MG/3ML) 0.083% nebulizer solution Take 2.5 mg by nebulization every 6 hours as needed for WheezingHistorical Med      chlorhexidine (PERIDEX) 0.12 % solution Take 15 mLs by mouth 2 times daily Apply to gums. Historical Med      Protein POWD Take by mouth Give 1 teaspoon (5ml) in water/juice BIDHistorical Med      QUEtiapine (SEROQUEL) 100 MG tablet Take 50 mg by mouth 3 times dailyHistorical Med      aspirin 81 MG tablet Take 81 mg by mouth dailyHistorical Med      divalproex (DEPAKOTE) 250 MG DR tablet Take 500 mg by mouth 2 times daily Historical Med      psyllium (REGULOID) 58.6 % powder Take 1 packet by mouth 3 times daily Take by mouth 3 times daily. , Oral, 3 TIMES DAILY, Historical Med      Probiotic Product (PROBIOTIC PO) Take 1 capsule by mouth 2 times daily as needed Historical Med      Sodium Phosphates (FLEET) Place 1 enema rectally daily as needed. Indications: Constipation      OXYGEN Inhale 2 L/min into the lungs as needed. ADMINISTER VIA N/C.  TITRATE PRN TO MAINTAIN PULSE OX ABOVE 92%. Indications: Hypoxia      acetaminophen (TYLENOL) 325 MG tablet Take 650 mg by mouth every 4 hours as needed. May give rectal if unable to tolerate PO administration. Indications: Fever, Pain      Magnesium Hydroxide (MILK OF MAGNESIA PO) Take 30 mLs by mouth daily as needed. Indications: Constipation      docusate sodium (COLACE) 100 MG capsule Take 100 mg by mouth daily Indications: Chronic Constipation Give 2 capsules daily. Hold for loose stools. Historical Med      Multiple Vitamin (MULTIVITAMIN PO) Take 1 tablet by mouth daily. Indications: multivitamin      calcium-vitamin D (OSCAL-500) 500-200 MG-UNIT per tablet Take 1 tablet by mouth 2 times daily. Administer with food. Indications: supplementHistorical Med      vitamin D (ERGOCALCIFEROL) 01026 UNITS CAPS capsule Take 50,000 Units by mouth once a week Indications: Vitamin D Deficiency On MondaysHistorical Med             ALLERGIES     Patient has no known allergies. FAMILY HISTORY     History reviewed. No pertinent family history. SOCIAL HISTORY       Social History     Socioeconomic History    Marital status: Single     Spouse name: None    Number of children: None    Years of education: None    Highest education level: None   Tobacco Use    Smoking status: Never    Smokeless tobacco: Never   Vaping Use    Vaping Use: Never used   Substance and Sexual Activity    Alcohol use:  No Drug use: No    Sexual activity: Never   Social History Narrative    ** Merged History Encounter **            SCREENINGS    Zoraida Coma Scale  Eye Opening: Spontaneous  Best Verbal Response: Incomprehensible speech  Best Motor Response: Localizes pain  Zoraida Coma Scale Score: 11        PHYSICAL EXAM    (up to 7 for level 4, 8 or more for level 5)     ED Triage Vitals   BP Temp Temp src Pulse Resp SpO2 Height Weight   -- -- -- -- -- -- -- --       Physical Exam  Vitals and nursing note reviewed. Constitutional:       General: She is not in acute distress. Appearance: Normal appearance. She is well-developed and normal weight. She is not ill-appearing, toxic-appearing or diaphoretic. HENT:      Head: Normocephalic and atraumatic. Nose: Nose normal. No congestion or rhinorrhea. Mouth/Throat:      Mouth: Mucous membranes are moist.      Pharynx: Oropharynx is clear. No oropharyngeal exudate or posterior oropharyngeal erythema. Eyes:      General: No scleral icterus. Right eye: No discharge. Left eye: No discharge. Extraocular Movements: Extraocular movements intact. Conjunctiva/sclera: Conjunctivae normal.      Pupils: Pupils are equal, round, and reactive to light. Neck:      Thyroid: No thyromegaly. Vascular: No carotid bruit or JVD. Trachea: No tracheal deviation. Cardiovascular:      Rate and Rhythm: Normal rate and regular rhythm. Pulses: Normal pulses. Heart sounds: Normal heart sounds. No murmur heard. No friction rub. No gallop. Pulmonary:      Effort: Pulmonary effort is normal. No respiratory distress. Breath sounds: Normal breath sounds. No stridor. No wheezing, rhonchi or rales. Chest:      Chest wall: No tenderness. Abdominal:      General: Abdomen is flat. Bowel sounds are normal. There is no distension. Palpations: Abdomen is soft. There is no mass. Tenderness: There is no abdominal tenderness.  There is no right CVA tenderness, left CVA tenderness, guarding or rebound. Hernia: No hernia is present. Musculoskeletal:         General: No swelling, tenderness, deformity or signs of injury. Normal range of motion. Cervical back: Normal range of motion and neck supple. No rigidity or tenderness. Right lower leg: No edema. Left lower leg: No edema. Lymphadenopathy:      Cervical: No cervical adenopathy. Skin:     General: Skin is warm and dry. Capillary Refill: Capillary refill takes less than 2 seconds. Coloration: Skin is not jaundiced or pale. Findings: No bruising, erythema, lesion or rash. Neurological:      General: No focal deficit present. Mental Status: She is alert and oriented to person, place, and time. Mental status is at baseline. Cranial Nerves: No cranial nerve deficit. Sensory: No sensory deficit. Motor: No weakness or abnormal muscle tone. Coordination: Coordination normal.      Gait: Gait normal.      Deep Tendon Reflexes: Reflexes are normal and symmetric. Reflexes normal.   Psychiatric:         Mood and Affect: Mood normal.         Behavior: Behavior normal.         Thought Content: Thought content normal.         Judgment: Judgment normal.       DIAGNOSTIC RESULTS     EKG: All EKG's are interpreted by the Emergency Department Physician who either signs or Co-signs this chart in the absence of a cardiologist.    12 lead EKG shows normal sinus rhythm, rate 95 bpm, normal intervals, normal electrical axis, no acute ST-T wave changes. RADIOLOGY:   Non-plain film images such as CT, Ultrasound and MRI are read by the radiologist. [facility] radiographic[facility] are visualized and preliminarily interpreted by the emergency physician with the below findings:    Chest x-ray shows no acute cardiopulmonary process. Water bottle shaped cardiac silhouette.     Interpretation per the Radiologist below, if available at the time of this note:    FL Modified Barium Swallow W Video   Final Result   No evidence of aspiration or laryngeal penetration when tested consistencies   of barium. Please see separate speech pathology report for full discussion of findings   and recommendations. RECOMMENDATIONS:   Unavailable         XR CHEST PORTABLE   Final Result   Bronchovascular prominence demonstrates no change, reviewed previous   subsegmental atelectatic streaks with no evidence of focal infiltrate or   consolidation. Distended large bowel loops seen in the upper abdomen. US DUP LOWER EXTREMITIES BILATERAL VENOUS   Final Result   No evidence of DVT in either lower extremity. CTA Chest W WO  (PE study)   Final Result   No evidence of pulmonary embolism or acute pulmonary abnormality. Atelectatic changes seen within the lung fields bilaterally. RECOMMENDATIONS:   6 mm right upper lobe nodule recommend a noncontrast chest CT at 6-12 months   then another noncontrast chest CT at 18-24 months         CT ABDOMEN PELVIS W IV CONTRAST Additional Contrast? None   Final Result   Increased stool burden seen diffusely throughout the colon to suggest   clinical presentation of constipation. The remainder of the abdomen and   pelvis is unremarkable. XR CHEST PORTABLE   Final Result   Left lower chest density as described above. RECOMMENDATION:   PA and lateral exam may better evaluate  as clinically warranted.                ED BEDSIDE ULTRASOUND:   Performed by ED Physician - none    LABS:  Labs Reviewed   COMPREHENSIVE METABOLIC PANEL - Abnormal; Notable for the following components:       Result Value    Glucose 172 (*)     All other components within normal limits   CBC WITH AUTO DIFFERENTIAL - Abnormal; Notable for the following components:    WBC 13.9 (*)     MCV 97.3 (*)     MCH 33.5 (*)     Platelets 475 (*)     Neutrophils % 80.9 (*)     Eosinophils % 0.1 (*)     Neutrophils Absolute 11.2 (*)     Monocytes Absolute 1.2 (*)     MCH 32.5 (*)     Platelets 97 (*)     Neutrophils % 80.3 (*)     Eosinophils % 0.4 (*)     Neutrophils Absolute 7.8 (*)     Lymphocytes Absolute 1.1 (*)     All other components within normal limits   BASIC METABOLIC PANEL - Abnormal; Notable for the following components:    Glucose 120 (*)     Creatinine 0.49 (*)     All other components within normal limits   LACTIC ACID - Abnormal; Notable for the following components:    Lactic Acid 2.7 (*)     All other components within normal limits   LACTIC ACID - Abnormal; Notable for the following components:    Lactic Acid 2.5 (*)     All other components within normal limits   AMMONIA - Abnormal; Notable for the following components:    Ammonia <10 (*)     All other components within normal limits   BASIC METABOLIC PANEL - Abnormal; Notable for the following components:    Anion Gap 16 (*)     Glucose 101 (*)     All other components within normal limits    Narrative:     addon   CBC WITH AUTO DIFFERENTIAL - Abnormal; Notable for the following components:    .3 (*)     MCH 32.4 (*)     Platelets 91 (*)     All other components within normal limits    Narrative:     addon   CBC WITH AUTO DIFFERENTIAL - Abnormal; Notable for the following components:    RBC 3.42 (*)     Hemoglobin 11.1 (*)     Hematocrit 33.3 (*)     MCV 97.4 (*)     MCH 32.5 (*)     Platelets 91 (*)     All other components within normal limits   CULTURE, BLOOD 2    Narrative:     ORDER#: [id number]                          ORDERED BY: JOSE ALFREDO FRANCO  SOURCE: Blood                              COLLECTED:  10/15/22 20:05  ANTIBIOTICS AT [facility].:                      RECEIVED :  10/16/22 14:07   CULTURE, BLOOD 1    Narrative:     ORDER#: [id number]                          ORDERED BY: Viktor Vuong  SOURCE: Blood                              COLLECTED:  10/15/22 20:05  ANTIBIOTICS AT [facility].:                      RECEIVED :  10/16/22 14:06   RSV RAPID ANTIGEN   RAPID INFLUENZA A/B ANTIGENS COVID-19, RAPID   MAGNESIUM   TROPONIN   BRAIN NATRIURETIC PEPTIDE   MICROSCOPIC URINALYSIS   LACTIC ACID   VALPROIC ACID LEVEL, TOTAL   URINALYSIS   LACTIC ACID   CBC WITH AUTO DIFFERENTIAL   BASIC METABOLIC PANEL       All other labs were within normal range or not returned as of this dictation. EMERGENCY DEPARTMENT COURSE and DIFFERENTIALDIAGNOSIS/MDM:   Vitals:    Vitals:    10/18/22 2200 10/19/22 0026 10/19/22 0556 10/19/22 0635   BP:   (!) 107/48    Pulse:  94 80    Resp:   22    Temp: 98 °F (36.7 °C)   98.5 °F (36.9 °C)   TempSrc: Rectal   Rectal   SpO2:   95%    Weight:       Height:               MDM     Amount and/or Complexity of Data Reviewed  Clinical lab tests: reviewed and ordered  Tests in the radiology section of CPT®: reviewed and ordered  Tests in the medicine section of CPT®: ordered and reviewed    Risk of Complications, Morbidity, and/or Mortality  Presenting problems: moderate  Diagnostic procedures: moderate  Management options: moderate    Patient Progress  Patient progress: improved      CRITICAL CARE TIME   Total Critical Care time was  minutes, excluding separately reportable procedures. There was a high probability of clinically significant/life threatening deterioration in the patient's condition which required my urgentintervention. CONSULTS:  None    PROCEDURES:  Unless otherwise noted below, none     Procedures    FINAL IMPRESSION      1. Septicemia ([facility] [address]    2. Seizure disorder [facility])          DISPOSITION/PLAN   DISPOSITION Admitted 10/15/2022 11:43:34 PM      PATIENT REFERRED TO:  No follow-up provider specified.     DISCHARGE MEDICATIONS:  Discharge Medication List as of 10/19/2022  1:44 PM             (Please note that portions of this note were completed with a voice recognitionprogram.  Efforts were made to edit the dictations but occasionally words are mis-transcribed.)    Patt Archuleta MD (electronically signed)  Attending Emergency Physician          Esa Storey Dora Ingram MD  10/15/22 9800       Petey Brito MD  10/23/22 9607

## 2022-10-16 LAB
ANION GAP SERPL CALCULATED.3IONS-SCNC: 11 MEQ/L (ref 9–15)
BASOPHILS ABSOLUTE: 0 K/UL (ref 0–0.1)
BASOPHILS RELATIVE PERCENT: 0.3 % (ref 0.1–1.2)
BUN BLDV-MCNC: 10 MG/DL (ref 8–23)
CALCIUM SERPL-MCNC: 9.3 MG/DL (ref 8.5–9.9)
CHLORIDE BLD-SCNC: 104 MEQ/L (ref 95–107)
CO2: 28 MEQ/L (ref 20–31)
CREAT SERPL-MCNC: 0.52 MG/DL (ref 0.5–0.9)
EKG ATRIAL RATE: 95 BPM
EKG P AXIS: 18 DEGREES
EKG P-R INTERVAL: 166 MS
EKG Q-T INTERVAL: 372 MS
EKG QRS DURATION: 94 MS
EKG QTC CALCULATION (BAZETT): 467 MS
EKG R AXIS: -12 DEGREES
EKG T AXIS: 78 DEGREES
EKG VENTRICULAR RATE: 95 BPM
EOSINOPHILS ABSOLUTE: 0 K/UL (ref 0–0.4)
EOSINOPHILS RELATIVE PERCENT: 0.2 % (ref 0.7–5.8)
GFR AFRICAN AMERICAN: >60
GFR NON-AFRICAN AMERICAN: >60
GLUCOSE BLD-MCNC: 112 MG/DL (ref 70–99)
HCT VFR BLD CALC: 39.5 % (ref 37–47)
HEMOGLOBIN: 13.3 G/DL (ref 11.2–15.7)
IMMATURE GRANULOCYTES #: 0 K/UL
IMMATURE GRANULOCYTES %: 0.4 %
LACTIC ACID: 2.8 MMOL/L (ref 0.5–2.2)
LACTIC ACID: 4.2 MMOL/L (ref 0.5–2.2)
LYMPHOCYTES ABSOLUTE: 1.6 K/UL (ref 1.2–3.7)
LYMPHOCYTES RELATIVE PERCENT: 15.1 %
MCH RBC QN AUTO: 32.2 PG (ref 25.6–32.2)
MCHC RBC AUTO-ENTMCNC: 33.7 % (ref 32.2–35.5)
MCV RBC AUTO: 95.6 FL (ref 79.4–94.8)
MONOCYTES ABSOLUTE: 0.9 K/UL (ref 0.2–0.9)
MONOCYTES RELATIVE PERCENT: 8.4 % (ref 4.7–12.5)
NEUTROPHILS ABSOLUTE: 7.8 K/UL (ref 1.6–6.1)
NEUTROPHILS RELATIVE PERCENT: 75.6 % (ref 34–71.1)
PDW BLD-RTO: 12 % (ref 11.7–14.4)
PLATELET # BLD: 108 K/UL (ref 182–369)
PLATELET SLIDE REVIEW: ABNORMAL
POTASSIUM SERPL-SCNC: 4.3 MEQ/L (ref 3.4–4.9)
RBC # BLD: 4.13 M/UL (ref 3.93–5.22)
SODIUM BLD-SCNC: 143 MEQ/L (ref 135–144)
WBC # BLD: 10.3 K/UL (ref 4–10)

## 2022-10-16 PROCEDURE — 85025 COMPLETE CBC W/AUTO DIFF WBC: CPT

## 2022-10-16 PROCEDURE — 80048 BASIC METABOLIC PNL TOTAL CA: CPT

## 2022-10-16 PROCEDURE — 6360000002 HC RX W HCPCS: Performed by: INTERNAL MEDICINE

## 2022-10-16 PROCEDURE — 96372 THER/PROPH/DIAG INJ SC/IM: CPT

## 2022-10-16 PROCEDURE — G0378 HOSPITAL OBSERVATION PER HR: HCPCS

## 2022-10-16 PROCEDURE — 36415 COLL VENOUS BLD VENIPUNCTURE: CPT

## 2022-10-16 PROCEDURE — 96367 TX/PROPH/DG ADDL SEQ IV INF: CPT

## 2022-10-16 PROCEDURE — 2700000000 HC OXYGEN THERAPY PER DAY

## 2022-10-16 PROCEDURE — 2580000003 HC RX 258: Performed by: INTERNAL MEDICINE

## 2022-10-16 PROCEDURE — 93010 ELECTROCARDIOGRAM REPORT: CPT | Performed by: INTERNAL MEDICINE

## 2022-10-16 PROCEDURE — 6370000000 HC RX 637 (ALT 250 FOR IP): Performed by: INTERNAL MEDICINE

## 2022-10-16 PROCEDURE — 83605 ASSAY OF LACTIC ACID: CPT

## 2022-10-16 RX ORDER — ASPIRIN 81 MG/1
81 TABLET, CHEWABLE ORAL DAILY
Status: DISCONTINUED | OUTPATIENT
Start: 2022-10-16 | End: 2022-10-19 | Stop reason: HOSPADM

## 2022-10-16 RX ORDER — POLYETHYLENE GLYCOL 3350 17 G/17G
17 POWDER, FOR SOLUTION ORAL DAILY PRN
Status: DISCONTINUED | OUTPATIENT
Start: 2022-10-16 | End: 2022-10-18

## 2022-10-16 RX ORDER — ACETAMINOPHEN 325 MG/1
650 TABLET ORAL EVERY 6 HOURS PRN
Status: DISCONTINUED | OUTPATIENT
Start: 2022-10-16 | End: 2022-10-19 | Stop reason: HOSPADM

## 2022-10-16 RX ORDER — CLONAZEPAM 0.5 MG/1
1 TABLET ORAL 3 TIMES DAILY
Status: DISCONTINUED | OUTPATIENT
Start: 2022-10-16 | End: 2022-10-19 | Stop reason: HOSPADM

## 2022-10-16 RX ORDER — SODIUM CHLORIDE 0.9 % (FLUSH) 0.9 %
10 SYRINGE (ML) INJECTION PRN
Status: DISCONTINUED | OUTPATIENT
Start: 2022-10-16 | End: 2022-10-19 | Stop reason: HOSPADM

## 2022-10-16 RX ORDER — ALBUTEROL SULFATE 2.5 MG/3ML
2.5 SOLUTION RESPIRATORY (INHALATION) EVERY 4 HOURS PRN
Status: DISCONTINUED | OUTPATIENT
Start: 2022-10-16 | End: 2022-10-19 | Stop reason: HOSPADM

## 2022-10-16 RX ORDER — SODIUM CHLORIDE 9 MG/ML
INJECTION, SOLUTION INTRAVENOUS PRN
Status: DISCONTINUED | OUTPATIENT
Start: 2022-10-16 | End: 2022-10-19 | Stop reason: HOSPADM

## 2022-10-16 RX ORDER — SODIUM CHLORIDE 9 MG/ML
INJECTION, SOLUTION INTRAVENOUS CONTINUOUS
Status: DISCONTINUED | OUTPATIENT
Start: 2022-10-16 | End: 2022-10-17

## 2022-10-16 RX ORDER — ONDANSETRON 2 MG/ML
4 INJECTION INTRAMUSCULAR; INTRAVENOUS EVERY 6 HOURS PRN
Status: DISCONTINUED | OUTPATIENT
Start: 2022-10-16 | End: 2022-10-19 | Stop reason: HOSPADM

## 2022-10-16 RX ORDER — ENOXAPARIN SODIUM 100 MG/ML
40 INJECTION SUBCUTANEOUS DAILY
Status: DISCONTINUED | OUTPATIENT
Start: 2022-10-16 | End: 2022-10-19 | Stop reason: HOSPADM

## 2022-10-16 RX ORDER — ACETAMINOPHEN 650 MG/1
650 SUPPOSITORY RECTAL EVERY 6 HOURS PRN
Status: DISCONTINUED | OUTPATIENT
Start: 2022-10-16 | End: 2022-10-19 | Stop reason: HOSPADM

## 2022-10-16 RX ORDER — DIVALPROEX SODIUM 500 MG/1
500 TABLET, EXTENDED RELEASE ORAL DAILY
Status: DISCONTINUED | OUTPATIENT
Start: 2022-10-16 | End: 2022-10-19 | Stop reason: HOSPADM

## 2022-10-16 RX ORDER — SODIUM CHLORIDE 0.9 % (FLUSH) 0.9 %
10 SYRINGE (ML) INJECTION EVERY 12 HOURS SCHEDULED
Status: DISCONTINUED | OUTPATIENT
Start: 2022-10-16 | End: 2022-10-19 | Stop reason: HOSPADM

## 2022-10-16 RX ORDER — QUETIAPINE FUMARATE 25 MG/1
50 TABLET, FILM COATED ORAL 3 TIMES DAILY
Status: DISCONTINUED | OUTPATIENT
Start: 2022-10-16 | End: 2022-10-19 | Stop reason: HOSPADM

## 2022-10-16 RX ORDER — PROMETHAZINE HYDROCHLORIDE 12.5 MG/1
12.5 TABLET ORAL EVERY 6 HOURS PRN
Status: DISCONTINUED | OUTPATIENT
Start: 2022-10-16 | End: 2022-10-19 | Stop reason: HOSPADM

## 2022-10-16 RX ADMIN — QUETIAPINE FUMARATE 50 MG: 25 TABLET ORAL at 08:57

## 2022-10-16 RX ADMIN — ASPIRIN 81 MG CHEWABLE TABLET 81 MG: 81 TABLET CHEWABLE at 08:56

## 2022-10-16 RX ADMIN — SODIUM CHLORIDE: 9 INJECTION, SOLUTION INTRAVENOUS at 20:04

## 2022-10-16 RX ADMIN — ENOXAPARIN SODIUM 40 MG: 100 INJECTION SUBCUTANEOUS at 08:56

## 2022-10-16 RX ADMIN — DIVALPROEX SODIUM 500 MG: 500 TABLET, FILM COATED, EXTENDED RELEASE ORAL at 08:57

## 2022-10-16 RX ADMIN — CLONAZEPAM 1 MG: 0.5 TABLET ORAL at 14:36

## 2022-10-16 RX ADMIN — QUETIAPINE FUMARATE 50 MG: 25 TABLET ORAL at 20:05

## 2022-10-16 RX ADMIN — CEFTRIAXONE 1000 MG: 1 INJECTION, POWDER, FOR SOLUTION INTRAMUSCULAR; INTRAVENOUS at 00:42

## 2022-10-16 RX ADMIN — CLONAZEPAM 1 MG: 0.5 TABLET ORAL at 20:05

## 2022-10-16 RX ADMIN — QUETIAPINE FUMARATE 50 MG: 25 TABLET ORAL at 14:35

## 2022-10-16 RX ADMIN — SODIUM CHLORIDE: 9 INJECTION, SOLUTION INTRAVENOUS at 09:25

## 2022-10-16 RX ADMIN — CLONAZEPAM 1 MG: 0.5 TABLET ORAL at 08:56

## 2022-10-16 RX ADMIN — SODIUM CHLORIDE, PRESERVATIVE FREE 10 ML: 5 INJECTION INTRAVENOUS at 08:57

## 2022-10-16 ASSESSMENT — PAIN SCALES - WONG BAKER: WONGBAKER_NUMERICALRESPONSE: 0

## 2022-10-16 NOTE — ED NOTES
Pt. To CT with RN and tech via bed. Pt. Stable on transfer.       Zaynab Richey, ALEXANDRO  10/15/22 4716

## 2022-10-16 NOTE — ED NOTES
Pt. Returned to room from CT. Pt. Tolerated CTA of chest and CT of abd with minor redirection. Pt. Stable on return to room.       Daya Jeff RN  10/15/22 2644

## 2022-10-16 NOTE — H&P
Hospital Medicine History & Physical      PCP: Paulding MD Fabio    Date of Admission: 10/15/2022    Date of Service: 10/16/22      Chief Complaint:  fall, desaturation      History Of Present Illness:  80 y.o. female who presented to [facility]  emergency department with complaint of sliding off a chair with closed head injury without loss of consciousness per SNF report. Patient was found by first responders hypoxemic with pulse ox in the 80s. She otherwise was asymptomatic. She is nonverbal.  Presented from nursing home by EMS. Comorbid conditions include mental health condition, seizure disorder, legally blind, chronic rhinitis, mood disorder, vitamin D deficiency, dyslipidemia. After initial stabilization she was admitted over night for further management       Past Medical History:          Diagnosis Date    Ceruminosis     Chronic rhinitis     Dyslipidemia     History of encephalitis     Large bowel obstruction (HCC)     Legally blind     Mood disorder ([address]     MR (mental retardation)     Osteoporosis     Periodontal disease     Pneumonia 12/9/2018    SBO (small bowel obstruction) ([facility] 3/22/2019    Seizure disorder ([facility]     Vitamin D deficiency        Past Surgical History:          Procedure Laterality Date    COLONOSCOPY N/A 3/22/2019    COLONOSCOPY performed by Britni Roblero MD at [facility]       Medications Prior to Admission:      Prior to Admission medications    Medication Sig Start Date End Date Taking?  Authorizing Provider   atorvastatin (LIPITOR) 40 MG tablet  9/23/22   Historical Provider, MD   divalproex (DEPAKOTE ER) 500 MG extended release tablet  9/23/22   Historical Provider, MD   lactulose [facility]) 10 GM/15ML solution  9/23/22   Historical Provider, MD   ibuprofen (ADVIL;MOTRIN) 200 MG tablet Take 200 mg by mouth Give two tabs by mouth every 6 hours as needed for fever >100.0 unrelieved by Tylenol    Historical Provider, MD   zinc oxide 13 % CREA Apply topically 2 times daily as needed for Rash    Historical Provider, MD   hydrocortisone (PREPARATION H) 1 % cream Apply topically 3 times daily Apply topically to hemorrhoids 3 times daily. Historical Provider, MD   simvastatin (ZOCOR) 20 MG tablet Take 20 mg by mouth nightly    Historical Provider, MD   Polyvinyl Alcohol-Povidone (REFRESH OP) Apply to eye One drop each eye four times a day    Historical Provider, MD   albuterol (PROVENTIL) (2.5 MG/3ML) 0.083% nebulizer solution Take 2.5 mg by nebulization every 6 hours as needed for Wheezing    Historical Provider, MD   atropine 1 % ophthalmic solution Place 1 drop into the left eye daily    Historical Provider, MD   chlorhexidine (PERIDEX) 0.12 % solution Take 15 mLs by mouth 2 times daily Apply to gums. Historical Provider, MD   Protein POWD Take by mouth Give 1 teaspoon (5ml) in water/juice BID    Historical Provider, MD   mupirocin (BACTROBAN) 2 % ointment Indications: History MRSA. Apply INTRANASALLY BID. RUB NARES TOGETHER. 7/23/19   Cristina Cordova MD   busPIRone (BUSPAR) 30 MG tablet Take 30 mg by mouth 2 times daily    Historical Provider, MD   QUEtiapine (SEROQUEL) 100 MG tablet Take 50 mg by mouth 3 times daily    Historical Provider, MD   neomycin-bacitracin-polymyxin (NEOSPORIN) 5-400-5000 ointment Apply topically 4 times daily Apply topically 4 times daily. Historical Provider, MD   clonazePAM (KLONOPIN) 1 MG tablet Take 1 tablet by mouth 3 times daily for 150 days.  1/31/18 10/15/22  Cristina Cordova MD   Sunscreens ([address] Apply topically as needed    Historical Provider, MD   aspirin 81 MG tablet Take 81 mg by mouth daily    Historical Provider, MD   divalproex (DEPAKOTE) 250 MG DR tablet Take 500 mg by mouth 2 times daily   Patient not taking: Reported on 10/15/2022    Historical Provider, MD   lactulose encephalopathy 10 GM/15ML SOLN solution Take 10 g by mouth daily Give 15 ml by mouth once daily    Historical Provider, MD   psyllium (REGULOID) 58.6 % powder Take 1 packet by mouth 3 times daily Take by mouth 3 times daily. Historical Provider, MD   sodium chloride (OCEAN, BABY AYR) 0.65 % nasal spray 1 spray by Nasal route as needed for Congestion    Historical Provider, MD   carbamide peroxide (DEBROX) 6.5 % otic solution 4 drops 2 times daily Instill 4 drops to affected ears twice daily for 4 days followed by flush on day 5 as needed for cerumen obstruction    Historical Provider, MD   ibuprofen (ADVIL;MOTRIN) 200 MG tablet Take 200 mg by mouth Two tablets every 6 hours as needed. Historical Provider, MD   Probiotic Product (PROBIOTIC PO) Take 1 capsule by mouth 2 times daily as needed     Historical Provider, MD   pseudoephedrine-codeine-guaifenesin (MYTUSSIN DAC) 30- MG/5ML solution Take 5 mLs by mouth 4 times daily as needed for Cough. Historical Provider, MD   Sodium Phosphates (FLEET) Place 1 enema rectally daily as needed. Indications: Constipation    Historical Provider, MD   OXYGEN Inhale 2 L/min into the lungs as needed. ADMINISTER VIA N/C.  TITRATE PRN TO MAINTAIN PULSE OX ABOVE 92%. Indications: Hypoxia    Historical Provider, MD   Cetirizine HCl 10 MG CAPS Take 10 mg by mouth daily. 7/3/12   EDDIE Owen   niacin (NIASPAN) 500 MG CR tablet Take 500 mg by mouth daily Indications: Dyslipidemia     Historical Provider, MD   acetaminophen (TYLENOL) 325 MG tablet Take 650 mg by mouth every 4 hours as needed. May give rectal if unable to tolerate PO administration. Indications: Fever, Pain    Historical Provider, MD   Magnesium Hydroxide (MILK OF MAGNESIA PO) Take 30 mLs by mouth daily as needed. Indications: Constipation    Historical Provider, MD   docusate sodium (COLACE) 100 MG capsule Take 100 mg by mouth daily Indications: Chronic Constipation Give 2 capsules daily. Hold for loose stools. Historical Provider, MD   fluticasone (FLONASE) 50 MCG/ACT nasal spray 1 spray by Nasal route 2 times daily. Indications:  Allergic Rhinitis    Historical Provider, MD   Multiple Vitamin (MULTIVITAMIN PO) Take 1 tablet by mouth daily. Indications: multivitamin    Historical Provider, MD   calcium-vitamin D (OSCAL-500) 500-200 MG-UNIT per tablet Take 1 tablet by mouth 2 times daily. Administer with food. Indications: supplement    Historical Provider, MD   vitamin D (ERGOCALCIFEROL) 05796 UNITS CAPS capsule Take 50,000 Units by mouth once a week Indications: Vitamin D Deficiency On Mondays    Historical Provider, MD       Allergies:  Patient has no known allergies. Social History:      The patient currently lives SNF    TOBACCO:   reports that she has never smoked. She has never used smokeless tobacco.  ETOH:   reports no history of alcohol use. Family History:       Reviewed in detail and negative for DM, CAD, Cancer, CVA. Positive as follows:    History reviewed. No pertinent family history. REVIEW OF SYSTEMS:   Pertinent positives as noted in the HPI. All other systems reviewed and negative. PHYSICAL EXAM:    /64   Pulse 90   Temp 98.4 °F (36.9 °C) (Oral)   Resp 18   Ht 5' 5\" (1.651 m)   Wt 163 lb 11.2 oz (74.3 kg)   SpO2 94%   BMI 27.24 kg/m²     General appearance:  No apparent distress, appears stated age and partially cooperative. HEENT:  Normal cephalic, atraumatic without obvious deformity. Legally blind   Neck: Supple, with full range of motion. No jugular venous distention. Trachea midline. Respiratory:  Normal respiratory effort. Clear to auscultation, bilaterally without Rales/Wheezes/Rhonchi. Cardiovascular:  Regular rate and rhythm with normal S1/S2 without murmurs, rubs or gallops. Abdomen: Soft, non-tender, non-distended with normal bowel sounds. Musculoskeletal:  No clubbing, cyanosis or edema bilaterally. Skin: Skin color, texture, turgor normal.  No rashes or lesions. Neurologic:  Neurovascularly intact without any focal sensory/motor deficits.     Psychiatric:  awake, non verbal   Capillary Refill: Brisk,< 3 seconds   Peripheral Pulses: +2 palpable, equal bilaterally       Labs:     Recent Labs     10/15/22  2005 10/16/22  0620   WBC 13.9* 10.3*   HGB 15.0 13.3   HCT 43.6 39.5   * 108*     Recent Labs     10/15/22  2005 10/16/22  0620    143   K 3.5 4.3    104   CO2 27 28   BUN 13 10   CREATININE 0.65 0.52   CALCIUM 9.3 9.3     Recent Labs     10/15/22  2005   AST 31   ALT 19   BILITOT 0.4   ALKPHOS 82     No results for input(s): INR in the last 72 hours. Recent Labs     10/15/22  2005   Kriss [place] <0.010       Urinalysis:      Lab Results   Component Value Date/Time    NITRU Negative 10/15/2022 08:33 PM    WBCUA 3-5 10/15/2022 08:33 PM    BACTERIA Negative 10/15/2022 08:33 PM    RBCUA 0-2 10/15/2022 08:33 PM    BLOODU Negative 10/15/2022 08:33 PM    SPECGRAV 1.020 10/15/2022 08:33 PM    GLUCOSEU Negative 10/15/2022 08:33 PM    GLUCOSEU NEG 03/02/2012 08:19 PM       Radiology:     CXR: I have reviewed the CXR with the following interpretation:   EKG:  I have reviewed the EKG with the following interpretation:     CTA Chest W WO  (PE study)   Final Result   No evidence of pulmonary embolism or acute pulmonary abnormality. Atelectatic changes seen within the lung fields bilaterally. RECOMMENDATIONS:   6 mm right upper lobe nodule recommend a noncontrast chest CT at 6-12 months   then another noncontrast chest CT at 18-24 months         CT ABDOMEN PELVIS W IV CONTRAST Additional Contrast? None   Final Result   Increased stool burden seen diffusely throughout the colon to suggest   clinical presentation of constipation. The remainder of the abdomen and   pelvis is unremarkable. XR CHEST PORTABLE   Final Result   Left lower chest density as described above. RECOMMENDATION:   PA and lateral exam may better evaluate  as clinically warranted.          US DUP LOWER EXTREMITIES BILATERAL VENOUS    (Results Pending)       ASSESSMENT:    Active Hospital Problems    Diagnosis Date Noted    Sepsis ([facility] [A41.9] 10/15/2022     Priority: Medium       PLAN:        DVT Prophylaxis: lovenox  Diet: ADULT DIET; Dysphagia - Minced and Moist; Mildly Thick (Nectar)  Code Status: Full Code    PT/OT Eval Status:     Dispo - Mechanical fall, weakness- SNF resident  Elevated lactic acid without obvious source of infection- due to trauma/dehydration/seizure? Continue IV NS, follow up with lacate q12 hrs  Elevated D dimer- negative for PE, checking legs US for possible DVT, continue with lovenox for DVT prophylaxis    R upper lung nodule per Ct report- may needs to repeat CT in 6-12 mts as follow up per radiology recommendations   History of seizure- on keppra  Acute desaturation before admission- O2 initiated, today off O2 and has saturation of 93%- follow up clinically    Medically stable for acute admission at Riki Vilchis MD    Thank you Manny Huff MD for the opportunity to be involved in this patient's care. If you have any questions or concerns please feel free to contact me.

## 2022-10-16 NOTE — ED NOTES
Pt. Is blind and non verbal, is observed to hit herself when staff doing procedures, per NH staff she does this when scared or frustrated. She is easily consoled and redirected and follows commands and is cooperative when procedures or care is explained to her. She is on 1-2 liters NC, SaO2 is maintained at 95-96%.        Lucille Pineda RN  10/15/22 2039

## 2022-10-16 NOTE — ED NOTES
Dr. Spencer Chavarria called back and accepted patient for admission. Will notify [facility].       Dwight Covington RN  10/15/22 0389

## 2022-10-17 ENCOUNTER — APPOINTMENT (OUTPATIENT)
Dept: GENERAL RADIOLOGY | Age: 82
DRG: 177 | End: 2022-10-17
Payer: MEDICARE

## 2022-10-17 ENCOUNTER — APPOINTMENT (OUTPATIENT)
Dept: ULTRASOUND IMAGING | Age: 82
DRG: 177 | End: 2022-10-17
Payer: MEDICARE
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
## 2022-10-17 PROBLEM — T17.928S ASPIRATION OF FOOD, SEQUELA: Status: ACTIVE | Noted: 2022-10-17

## 2022-10-17 LAB
ANION GAP SERPL CALCULATED.3IONS-SCNC: 12 MEQ/L (ref 9–15)
BASOPHILS ABSOLUTE: 0.1 K/UL (ref 0–0.1)
BASOPHILS RELATIVE PERCENT: 0.5 % (ref 0.1–1.2)
BUN BLDV-MCNC: 9 MG/DL (ref 8–23)
CALCIUM SERPL-MCNC: 8.9 MG/DL (ref 8.5–9.9)
CHLORIDE BLD-SCNC: 101 MEQ/L (ref 95–107)
CO2: 27 MEQ/L (ref 20–31)
CREAT SERPL-MCNC: 0.49 MG/DL (ref 0.5–0.9)
EOSINOPHILS ABSOLUTE: 0 K/UL (ref 0–0.4)
EOSINOPHILS RELATIVE PERCENT: 0.4 % (ref 0.7–5.8)
GFR AFRICAN AMERICAN: >60
GFR NON-AFRICAN AMERICAN: >60
GLUCOSE BLD-MCNC: 120 MG/DL (ref 70–99)
HCT VFR BLD CALC: 37.9 % (ref 37–47)
HEMOGLOBIN: 12.9 G/DL (ref 11.2–15.7)
IMMATURE GRANULOCYTES #: 0 K/UL
IMMATURE GRANULOCYTES %: 0.3 %
LACTIC ACID: 2.2 MMOL/L (ref 0.5–2.2)
LACTIC ACID: 2.7 MMOL/L (ref 0.5–2.2)
LYMPHOCYTES ABSOLUTE: 1.1 K/UL (ref 1.2–3.7)
LYMPHOCYTES RELATIVE PERCENT: 11.8 %
MCH RBC QN AUTO: 32.5 PG (ref 25.6–32.2)
MCHC RBC AUTO-ENTMCNC: 34 % (ref 32.2–35.5)
MCV RBC AUTO: 95.5 FL (ref 79.4–94.8)
MONOCYTES ABSOLUTE: 0.7 K/UL (ref 0.2–0.9)
MONOCYTES RELATIVE PERCENT: 6.7 % (ref 4.7–12.5)
NEUTROPHILS ABSOLUTE: 7.8 K/UL (ref 1.6–6.1)
NEUTROPHILS RELATIVE PERCENT: 80.3 % (ref 34–71.1)
PDW BLD-RTO: 12.3 % (ref 11.7–14.4)
PLATELET # BLD: 97 K/UL (ref 182–369)
POTASSIUM SERPL-SCNC: 3.8 MEQ/L (ref 3.4–4.9)
RBC # BLD: 3.97 M/UL (ref 3.93–5.22)
SODIUM BLD-SCNC: 140 MEQ/L (ref 135–144)
WBC # BLD: 9.7 K/UL (ref 4–10)

## 2022-10-17 PROCEDURE — 2580000003 HC RX 258: Performed by: INTERNAL MEDICINE

## 2022-10-17 PROCEDURE — 92610 EVALUATE SWALLOWING FUNCTION: CPT

## 2022-10-17 PROCEDURE — 6370000000 HC RX 637 (ALT 250 FOR IP): Performed by: INTERNAL MEDICINE

## 2022-10-17 PROCEDURE — 93970 EXTREMITY STUDY: CPT

## 2022-10-17 PROCEDURE — 85025 COMPLETE CBC W/AUTO DIFF WBC: CPT

## 2022-10-17 PROCEDURE — 6360000002 HC RX W HCPCS: Performed by: INTERNAL MEDICINE

## 2022-10-17 PROCEDURE — 94760 N-INVAS EAR/PLS OXIMETRY 1: CPT

## 2022-10-17 PROCEDURE — 80048 BASIC METABOLIC PNL TOTAL CA: CPT

## 2022-10-17 PROCEDURE — 36415 COLL VENOUS BLD VENIPUNCTURE: CPT

## 2022-10-17 PROCEDURE — 83605 ASSAY OF LACTIC ACID: CPT

## 2022-10-17 PROCEDURE — 96372 THER/PROPH/DIAG INJ SC/IM: CPT

## 2022-10-17 PROCEDURE — 71045 X-RAY EXAM CHEST 1 VIEW: CPT

## 2022-10-17 PROCEDURE — 96366 THER/PROPH/DIAG IV INF ADDON: CPT

## 2022-10-17 PROCEDURE — 1210000000 HC MED SURG R&B

## 2022-10-17 RX ADMIN — CLONAZEPAM 1 MG: 0.5 TABLET ORAL at 08:34

## 2022-10-17 RX ADMIN — QUETIAPINE FUMARATE 50 MG: 25 TABLET ORAL at 14:52

## 2022-10-17 RX ADMIN — CEFTRIAXONE 1000 MG: 1 INJECTION, POWDER, FOR SOLUTION INTRAMUSCULAR; INTRAVENOUS at 00:56

## 2022-10-17 RX ADMIN — CLONAZEPAM 1 MG: 0.5 TABLET ORAL at 14:53

## 2022-10-17 RX ADMIN — PIPERACILLIN AND TAZOBACTAM 3375 MG: 3; .375 INJECTION, POWDER, FOR SOLUTION INTRAVENOUS at 16:48

## 2022-10-17 RX ADMIN — QUETIAPINE FUMARATE 50 MG: 25 TABLET ORAL at 08:34

## 2022-10-17 RX ADMIN — ACETAMINOPHEN 650 MG: 325 TABLET ORAL at 08:34

## 2022-10-17 RX ADMIN — PIPERACILLIN AND TAZOBACTAM 3375 MG: 3; .375 INJECTION, POWDER, FOR SOLUTION INTRAVENOUS at 23:44

## 2022-10-17 RX ADMIN — PIPERACILLIN AND TAZOBACTAM 4500 MG: 4; .5 INJECTION, POWDER, FOR SOLUTION INTRAVENOUS at 11:41

## 2022-10-17 RX ADMIN — SODIUM CHLORIDE: 9 INJECTION, SOLUTION INTRAVENOUS at 06:21

## 2022-10-17 RX ADMIN — CLONAZEPAM 1 MG: 0.5 TABLET ORAL at 20:14

## 2022-10-17 RX ADMIN — QUETIAPINE FUMARATE 50 MG: 25 TABLET ORAL at 20:14

## 2022-10-17 RX ADMIN — ASPIRIN 81 MG CHEWABLE TABLET 81 MG: 81 TABLET CHEWABLE at 08:34

## 2022-10-17 RX ADMIN — SODIUM CHLORIDE, PRESERVATIVE FREE 10 ML: 5 INJECTION INTRAVENOUS at 20:15

## 2022-10-17 RX ADMIN — ENOXAPARIN SODIUM 40 MG: 100 INJECTION SUBCUTANEOUS at 08:34

## 2022-10-17 RX ADMIN — DIVALPROEX SODIUM 500 MG: 500 TABLET, FILM COATED, EXTENDED RELEASE ORAL at 08:34

## 2022-10-17 NOTE — PROGRESS NOTES
Rectal temp is 101.4F this a.m. Dr. Elliott Mandel notified via [employer]. Other vital signs are WNL. She remains on 2L NC. No s/s acute distres noted.

## 2022-10-17 NOTE — PROGRESS NOTES
Following up on a patient. Patient is having trouble swallowing as per her nurse. Patient is MRDD. She is unable to provide any information. She seems to be somewhat tachypneic. Low-grade temperature reported. No hematemesis or melena. No seizure or convulsion      ROS: 12 system review otherwise is negative for acute signs or symptoms over the last 24 hrs. Exam: Awake, significant functional and cognitive loss. Unknown baseline state, at baseline according to report. HEENT: Pink conjunctiva and buccal mucosa. Normal and throat and nose  Neck: Supple, no nuchal rigidity. Endo: No thyromegaly. Vascular: No JVD or carotid bruit. Chest: Bilateral fine wheezing. Poor respiratory drive. Heart: Regular rate and rhythm, no extra sounds. No murmur, no rub. Abdomen: Soft, no tenderness, no rebound, no rigidity. Clinically I could not exclude the possibility of intra-abdominal mass or organomegaly. LE: No cyanosis or clubbing, no varices or edema. Neuro: Awake, significant cognitive loss. Unable to answer any questions. Unable to follow commands. Unable to sit up or stand up. MS: No joint effusion or tenderness. Skin: No skin rash, itching, bruising or significant findings. Assessment and plan:     *Suspect aspiration pneumonia based on worsening wheezing and difficulty swallowing as per nursing staff. *Hypoxemia, hypoxic respiratory failure, probably secondary to above. *SIRS present on admission    *MRDD. Her PPS is 20%. ECOG score is 4. BRYSON score is 0. Fast score is 7D-E.    *Seizure disorder. *Thrombocytopenia, unknown etiology. Patient was started on Lovenox. Hold Lovenox at this time and monitor platelets count. We will check for HIT if platelets continue to drop. *Elevated dimer. No pulmonary embolism on CT. Requested venous study. *Lung nodule. Repeat CAT scan in 6 to 12 months. *Multiple other medical problems not listed above.     Plan:  Change the antibiotic to Zosyn to cover anaerobes/aspiration monitor. Hold Lovenox. If her platelets continue to drop we will check for HIT  Check valproic acid. Check ammonia level. Consider CT head. Venous legs to rule out DVT. Repeat CAT scan of the chest in 6 months. We will discuss with guardian goals of care. Overall patient has poor prognosis given the degree of her cognitive and functional disability. Consider hospice care. I may or may not have addressed all of this pt symptoms, medical issues, abnormal labs and findings. Pt will need additional work up, investigation, testing, surveillance, and treatment to be done at a later time and date during this hospitalization or post discharge by PCP and other out pt providers.

## 2022-10-17 NOTE — PROGRESS NOTES
Speech Language Pathology  Facility/Department: [facility] MED SURG UNIT   CLINICAL BEDSIDE SWALLOW EVALUATION    NAME: Alec Hopkins  : 1940  MRN: [id number]    ADMISSION DATE: 10/15/2022  ADMITTING DIAGNOSIS: has MR (mental retardation); Seizure disorder ([facility] Legally blind; Chronic rhinitis; Mood disorder ([facility] Vitamin D deficiency; Full code status; Dietary restriction; Dyslipidemia; Periodontal disease; Mental retardation; Drug-induced thrombocytopenia; Leucopenia; Aphakia, left eye; Hyphema, left eye; Phthisis bulbi of right eye; Pneumonia due to COVID-19 virus; Other thrombophilia ([facility] Sepsis ([facility] and Aspiration of food, sequela on their problem list.  ONSET DATE:     Recent Chest Xray/CT of Chest:     Date of Eval: 10/17/2022  Evaluating Therapist: EUNICE Horton    Current Diet level:  Current Diet : Pureed  NTL by cup    Primary Complaint  Patient Complaint: sepsis, suspect asp pna    Pain:  Pain Assessment  Pain Assessment:  (inocencio)  [place]-Baker Pain Rating: No hurt  Response to Pain Intervention: Unable to communicate  Side Effects: No reported side effects    Reason for Referral  Alec Hopkins was referred for a bedside swallow evaluation to assess the efficiency of her swallow function, identify signs and symptoms of aspiration and make recommendations regarding safe dietary consistencies, effective compensatory strategies, and safe eating environment. Impression  Dysphagia Diagnosis: Mild to moderate oral stage dysphagia; Moderate to severe pharyngeal stage dysphagia; Suspected needs further assessment  Dysphagia Impression : mod oral and suspected pharyngeal dysphagia - no cough on initial swallow, delayed repeated cough and sneezing when HOB lowered after trials  Dysphagia Outcome Severity Scale: Level 3: Moderate dysphagia- Total assisstance, supervision or strategies.  Two or more diet consistencies restricted     Treatment Plan  Requires SLP Intervention: No  Duration of Treatment: eval only          Recommended Diet and Intervention        Recommended Form of Meds: Crushed in puree as able  Recommendations: Modified barium swallow study       Compensatory Swallowing Strategies  Compensatory Swallowing Strategies : Total feed;Upright as possible for all oral intake;Remain upright for 30-45 minutes after meals; Alternate solids and liquids    Treatment/Goals   No Tx,eval only    General  Chart Reviewed: Yes  Comments: MRDD, sepsis, suspected aspiration  Subjective  Subjective: asleep when arrived in room  Behavior/Cognition: Confused; Lethargic;Doesn't follow directions  Respiratory Status: Room air  O2 Device: None (Room air)  Communication Observation: Non-verbal  Follows Directions: None  Dentition: Poor dental/oral hygeine  Patient Positioning: Upright in bed  Baseline Vocal Quality: Normal  Volitional Cough:  (unable to elicit)  Prior Dysphagia History: on puree/ntl at residential facility  Consistencies Administered: Pureed; Moderately Thick  - cup           Vision/Hearing  Vision  Vision: Impaired  Vision Exceptions: Legally blind  Hearing  Hearing: Within functional limits    Oral Motor Deficits  Oral/Motor  Gag: Other (comment)    Oral Phase Dysfunction  Oral Phase  Oral Phase: Exceptions  Oral Phase  Oral Phase - Comment: mod oral     Indicators of Pharyngeal Phase Dysfunction   Pharyngeal Phase   Pharyngeal Phase: suspect pharyngeal residues    Prognosis  Individuals consulted  Consulted and agree with results and recommendations: RN  RN Name: Elliot Stone    Education  Patient Education: pt not educable  Safety Devices in place: Yes  Type of devices: Bed alarm in place;Call light within reach       Therapy Time  SLP Individual Minutes  Time In: 9984  Time Out: [address]  Minutes: [address], [address]  10/17/2022 2:12 PM

## 2022-10-17 NOTE — PROGRESS NOTES
Chart reviewed. Patient's care discussed in daily quality rounds. Patient was admitted to [facility] from [facility] [place] with a diagnosis of sepsis. Patient reported to have been started on IV antibiotics and has a swallow test ordered. This  met with patient to attempt to discuss DC planning. However, patient is not verbally conversant with this . Patient does grunt at times attempting to respond to this . Patient does not appear to be experiencing any pain by evidence of no facial grimacing or restlessness. This  contacted patient's legal guardian Ubaldo Pat #[phone] with update. Maurice Naranjo reports that she is court appointed legal guardian for patient. Maurice Naranjo reports that plan is for patient to return to nursing home once medically stable for DC. SS to continue to follow as needed while patient is at [facility].

## 2022-10-18 ENCOUNTER — HOSPITAL ENCOUNTER (INPATIENT)
Dept: GENERAL RADIOLOGY | Age: 82
Discharge: HOME | DRG: 179 | End: 2022-10-20
Payer: MEDICARE

## 2022-10-18 ENCOUNTER — HOSPITAL ENCOUNTER (OUTPATIENT)
Dept: SPEECH THERAPY | Age: 82
Setting detail: THERAPIES SERIES
Discharge: HOME | End: 2022-10-18
Payer: MEDICARE

## 2022-10-18 LAB
AMMONIA: <10 UMOL/L (ref 11–51)
ANION GAP SERPL CALCULATED.3IONS-SCNC: 16 MEQ/L (ref 9–15)
BASOPHILS ABSOLUTE: 0.1 K/UL (ref 0–0.1)
BASOPHILS RELATIVE PERCENT: 0.7 % (ref 0.1–1.2)
BILIRUBIN URINE: NEGATIVE
BLOOD, URINE: NEGATIVE
BUN BLDV-MCNC: 15 MG/DL (ref 8–23)
CALCIUM SERPL-MCNC: 8.7 MG/DL (ref 8.5–9.9)
CHLORIDE BLD-SCNC: 100 MEQ/L (ref 95–107)
CLARITY: CLEAR
CO2: 22 MEQ/L (ref 20–31)
COLOR: YELLOW
CREAT SERPL-MCNC: 0.54 MG/DL (ref 0.5–0.9)
EOSINOPHILS ABSOLUTE: 0.2 K/UL (ref 0–0.4)
EOSINOPHILS RELATIVE PERCENT: 1.9 % (ref 0.7–5.8)
GFR SERPL CREATININE-BSD FRML MDRD: >60 ML/MIN/{1.73_M2}
GLUCOSE BLD-MCNC: 101 MG/DL (ref 70–99)
GLUCOSE URINE: NEGATIVE MG/DL
HCT VFR BLD CALC: 39.6 % (ref 37–47)
HEMOGLOBIN: 12.8 G/DL (ref 11.2–15.7)
IMMATURE GRANULOCYTES #: 0 K/UL
IMMATURE GRANULOCYTES %: 0.4 %
KETONES, URINE: NEGATIVE MG/DL
LACTIC ACID: 2.5 MMOL/L (ref 0.5–2.2)
LEUKOCYTE ESTERASE, URINE: NEGATIVE
LYMPHOCYTES ABSOLUTE: 1.7 K/UL (ref 1.2–3.7)
LYMPHOCYTES RELATIVE PERCENT: 20.4 %
MCH RBC QN AUTO: 32.4 PG (ref 25.6–32.2)
MCHC RBC AUTO-ENTMCNC: 32.3 % (ref 32.2–35.5)
MCV RBC AUTO: 100.3 FL (ref 79.4–94.8)
MONOCYTES ABSOLUTE: 0.7 K/UL (ref 0.2–0.9)
MONOCYTES RELATIVE PERCENT: 8.6 % (ref 4.7–12.5)
NEUTROPHILS ABSOLUTE: 5.8 K/UL (ref 1.6–6.1)
NEUTROPHILS RELATIVE PERCENT: 68 % (ref 34–71.1)
NITRITE, URINE: NEGATIVE
PDW BLD-RTO: 12.6 % (ref 11.7–14.4)
PH UA: 7 (ref 5–9)
PLATELET # BLD: 91 K/UL (ref 182–369)
POTASSIUM SERPL-SCNC: 4 MEQ/L (ref 3.4–4.9)
PROTEIN UA: NEGATIVE MG/DL
RBC # BLD: 3.95 M/UL (ref 3.93–5.22)
SODIUM BLD-SCNC: 138 MEQ/L (ref 135–144)
SPECIFIC GRAVITY UA: 1.01 (ref 1–1.03)
UROBILINOGEN, URINE: 0.2 E.U./DL
VALPROIC ACID LEVEL: 65.9 UG/ML (ref 50–100)
WBC # BLD: 8.5 K/UL (ref 4–10)

## 2022-10-18 PROCEDURE — 82140 ASSAY OF AMMONIA: CPT

## 2022-10-18 PROCEDURE — 85025 COMPLETE CBC W/AUTO DIFF WBC: CPT

## 2022-10-18 PROCEDURE — 1210000000 HC MED SURG R&B

## 2022-10-18 PROCEDURE — 2700000000 HC OXYGEN THERAPY PER DAY

## 2022-10-18 PROCEDURE — 6360000002 HC RX W HCPCS: Performed by: INTERNAL MEDICINE

## 2022-10-18 PROCEDURE — 81003 URINALYSIS AUTO W/O SCOPE: CPT

## 2022-10-18 PROCEDURE — 2580000003 HC RX 258: Performed by: INTERNAL MEDICINE

## 2022-10-18 PROCEDURE — 83605 ASSAY OF LACTIC ACID: CPT

## 2022-10-18 PROCEDURE — 80164 ASSAY DIPROPYLACETIC ACD TOT: CPT

## 2022-10-18 PROCEDURE — 6370000000 HC RX 637 (ALT 250 FOR IP): Performed by: INTERNAL MEDICINE

## 2022-10-18 PROCEDURE — 80048 BASIC METABOLIC PNL TOTAL CA: CPT

## 2022-10-18 PROCEDURE — 92611 MOTION FLUOROSCOPY/SWALLOW: CPT

## 2022-10-18 PROCEDURE — 74230 X-RAY XM SWLNG FUNCJ C+: CPT

## 2022-10-18 PROCEDURE — 36415 COLL VENOUS BLD VENIPUNCTURE: CPT

## 2022-10-18 PROCEDURE — 2500000003 HC RX 250 WO HCPCS: Performed by: INTERNAL MEDICINE

## 2022-10-18 RX ORDER — BISACODYL 10 MG
10 SUPPOSITORY, RECTAL RECTAL DAILY
Status: DISCONTINUED | OUTPATIENT
Start: 2022-10-18 | End: 2022-10-19 | Stop reason: HOSPADM

## 2022-10-18 RX ORDER — SENNA PLUS 8.6 MG/1
2 TABLET ORAL DAILY
Status: DISCONTINUED | OUTPATIENT
Start: 2022-10-18 | End: 2022-10-19 | Stop reason: HOSPADM

## 2022-10-18 RX ORDER — POLYETHYLENE GLYCOL 3350 17 G/17G
17 POWDER, FOR SOLUTION ORAL 2 TIMES DAILY
Status: DISCONTINUED | OUTPATIENT
Start: 2022-10-18 | End: 2022-10-19 | Stop reason: HOSPADM

## 2022-10-18 RX ADMIN — SODIUM CHLORIDE, PRESERVATIVE FREE 10 ML: 5 INJECTION INTRAVENOUS at 07:55

## 2022-10-18 RX ADMIN — BARIUM SULFATE 80 ML: 400 SUSPENSION ORAL at 13:42

## 2022-10-18 RX ADMIN — MAGNESIUM HYDROXIDE 30 ML: 400 SUSPENSION ORAL at 09:09

## 2022-10-18 RX ADMIN — DIVALPROEX SODIUM 500 MG: 500 TABLET, FILM COATED, EXTENDED RELEASE ORAL at 07:56

## 2022-10-18 RX ADMIN — SENNOSIDES 17.2 MG: 8.6 TABLET, FILM COATED ORAL at 09:09

## 2022-10-18 RX ADMIN — CLONAZEPAM 1 MG: 0.5 TABLET ORAL at 07:56

## 2022-10-18 RX ADMIN — POLYETHYLENE GLYCOL 3350 17 G: 17 POWDER, FOR SOLUTION ORAL at 21:54

## 2022-10-18 RX ADMIN — CLONAZEPAM 1 MG: 0.5 TABLET ORAL at 21:52

## 2022-10-18 RX ADMIN — PIPERACILLIN AND TAZOBACTAM 3375 MG: 3; .375 INJECTION, POWDER, FOR SOLUTION INTRAVENOUS at 07:54

## 2022-10-18 RX ADMIN — POLYETHYLENE GLYCOL 3350 17 G: 17 POWDER, FOR SOLUTION ORAL at 09:09

## 2022-10-18 RX ADMIN — ACETAMINOPHEN 650 MG: 325 TABLET ORAL at 06:28

## 2022-10-18 RX ADMIN — QUETIAPINE FUMARATE 50 MG: 25 TABLET ORAL at 07:56

## 2022-10-18 RX ADMIN — CLONAZEPAM 1 MG: 0.5 TABLET ORAL at 14:53

## 2022-10-18 RX ADMIN — ASPIRIN 81 MG CHEWABLE TABLET 81 MG: 81 TABLET CHEWABLE at 07:56

## 2022-10-18 RX ADMIN — BARIUM SULFATE 50 G: 0.81 POWDER, FOR SUSPENSION ORAL at 13:42

## 2022-10-18 RX ADMIN — PIPERACILLIN AND TAZOBACTAM 3375 MG: 3; .375 INJECTION, POWDER, FOR SOLUTION INTRAVENOUS at 23:32

## 2022-10-18 RX ADMIN — BISACODYL 10 MG: 10 SUPPOSITORY RECTAL at 14:53

## 2022-10-18 RX ADMIN — PIPERACILLIN AND TAZOBACTAM 3375 MG: 3; .375 INJECTION, POWDER, FOR SOLUTION INTRAVENOUS at 16:01

## 2022-10-18 RX ADMIN — QUETIAPINE FUMARATE 50 MG: 25 TABLET ORAL at 21:53

## 2022-10-18 RX ADMIN — QUETIAPINE FUMARATE 50 MG: 25 TABLET ORAL at 14:53

## 2022-10-18 ASSESSMENT — PAIN SCALES - WONG BAKER
WONGBAKER_NUMERICALRESPONSE: 0

## 2022-10-18 NOTE — PROCEDURES
[facility]   [place]ility[place] [facility]  Speech Language Pathology  Modified Barium Swallow ([address])    NAME:Therese Neri  : 1940 (80 y.o.)   [x]   confirmed    MRN: [id number]  ADMISSION DATE: 10/16/2022- Admitted to [address] [place] [address] DIAGNOSIS(ES): Sepsis    Patient Active Problem List    Diagnosis Date Noted    Aspiration of food, sequela 10/17/2022    Sepsis ([address] 10/15/2022    Other thrombophilia ([address] 01/15/2021    Pneumonia due to COVID-19 virus 2020    Aphakia, left eye 10/08/2018    Hyphema, left eye 10/08/2018    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    Leucopenia 2018    Drug-induced thrombocytopenia 2017    Mental retardation 2013    Dyslipidemia     Periodontal disease     Full code status 2013    Dietary restriction 2013    Vitamin D deficiency     MR (mental retardation)     Seizure disorder ([address]     Legally blind     Chronic rhinitis     Mood disorder ([address]      Past Medical History:   Diagnosis Date    Ceruminosis     Chronic rhinitis     Dyslipidemia     History of encephalitis     Large bowel obstruction ([address]     Legally blind     Mood disorder ([address]     MR (mental retardation)     Osteoporosis     Periodontal disease     Pneumonia 2018    SBO (small bowel obstruction) ([address] 3/22/2019    Seizure disorder ([address]     Vitamin D deficiency      Past Surgical History:   Procedure Laterality Date    COLONOSCOPY N/A 3/22/2019    COLONOSCOPY performed by Tonny Dai MD at [facility]     No Known Allergies    Date of Onset:  Pt chart review, pt presented to [facility] ER with closed head injury without loss of consciousness s/p sliding off a chair. Pt was found by first responders hyoxemic with pulse ox in the 80s. Date of Evaluation: 10/18/2022   Evaluating Therapist: Elicia Preciado, SLP      DYSPHAGIA DIAGNOSIS:  Moderate-severe oral dysphagia, Mild pharyngeal dysphagia    DIET RECOMMENDATIONS:  Solid consistency: Pureed  Liquid consistency: Mildly Thick  Liquid administration via: Spoon or Cup  Medication administration: Crushed in puree  Supervision: 1:1 assist    Compensatory Swallowing Strategies:   Upright positioning  Small bites/sips  Eat/Feed Slowly  Remain upright for 30-45 minutes   Assist feed       Reason for Referral  Bria Johnson was referred for a modified barium swallow evaluation to assess the efficiency of her swallow function, assess for aspiration, and make recommendations regarding safe dietary consistencies, effective compensatory strategies, and safe eating environment. GENERAL  Previous swallow testing:  BSE completed 10/17/2022 with recommendation for puree diet with mildly thick liquids from cup and MBS. Per chart review, pt is on this diet at group home. Cognitive status:   Alert  Cognitive Deficits  Legally blind    Communication observation:   Non verbal  and Other:  occasional moans and grunts    Follows Directions: Other: Pt followed direction to open mouth. No other directions were followed.     Diet Level Prior to this Evaluation:    Pureed  Mildly Thick    Current respiratory status:      Oxygen via nasal cannula   2 liters    Baseline Vocal Quality:  CNT    Oral Hygiene:  Clean and Moist     Dentition:  Edentulous    Oral mechanism examination:  Labial: Decreased ROM, and Impaired coordination  Lingual: Decreased strength, Decreased ROM, and Decreased coordination  Velum: Unable to visualize  Mandible: No impairment      PROCEDURE   Patient Positioning: Seated 70-90°  Viewing Plane(s): Lateral  Contrast: commercially prepared, non-standardized barium viscosities; ranging from thin liquid to pudding consistency  Consistencies Administered and # of Trials:  Puree x 3 trials and Mildly thick x 3 trials  How Presented:  SLP fed      ORAL PHASE:  Bolus Acceptance:   No impairment    Bolus Formation/Control:   Decreased bolus cohesion: Mildly thick/Nectar (spoon)  Premature bolus loss to pharynx: Puree and Mildly thick/Nectar (spoon)    Bolus Propulsion:  Lingual pumping: Puree and Mildly thick/Nectar  Increased time and effort: Puree and Mildly thick/Nectar    Oral Residue:   Complete oral clearance       ORAL STAGE IMPRESSION:  Moderate-severe oral dysphagia characterized by decreased bolus cohesion and propulsion with lingual pumping. Pt had premature entry to the valleculae with puree and premature entry to the pyriforms with nectar. Pt had no oral residue with consistencies tested. Pt unable to accept bolus from straw despite attempts. PHARYNGEAL STAGE:   Initiation of Pharyngeal Swallow:  Valleculae: Puree  Pyriform sinuses: Mildly thick/Nectar    Tongue Base Retraction:  Reduced: Puree and Mildly thick/Nectar    Laryngeal Elevation:  No impairment    Anterior Hyoid Excursion:  No impairment    Epiglottic Inversion:  Complete    Pharyngeal Contraction:  No impairment    Pharyngeal Residue:  Complete pharyngeal clearance    Laryngeal Penetration  No occurrences    Aspiration  No occurrences        Penetration-Aspiration Scale  Puree  Kaycee 1 Material does not enter the airway    2 Material enters the airway, remains above the vocal folds, and is ejected from the airway    3 Material enters the airway, remains above the vocal folds, and is not ejected from the airway    4 Material enters the airway, contacts the vocal folds, and is ejected from the airway    5 Material enters the airway, contacts the vocal folds, and is not ejected from the airway    6 Material enters the airway, passes below the vocal folds and is ejected into the larynx or out of the airway    7 Material enters the airway, passes below the vocal folds, and is not ejected from the trachea despite effort    8 Material enters the airway, passes below the vocal folds, and no effort is made to eject.          PHARYNGEAL STAGE IMPRESSION:  Mild pharyngeal dysphagia characterized by decreased base of tongue retraction and delayed initiation of swallow of 2-4 seconds with cascade spillage to the pyriforms with nectar trials. Pt had adequate hyolaryngeal elevation and pharyngeal contraction. No pharyngeal residue, penetration, or aspiration occurred. Thin trials not tested due to oral stage deficits, being a total feed during exam, and positioning during exam which would cause risk for aspiration due to pt's dysphagia history. CERVICAL ESOPHAGEAL STAGE :   No impairment observed            THERAPY RECOMMENDATIONS:   Therapy is recommended to:  Assess tolerance of recommended diet      Nursing notified: Tato Ward RN  Therapy at discretion of facility/treating Speech-Language Pathologist      PLAN OF CARE:   Long Term Goals:    Patient will tolerate least restrictive diet with no overt s/s of difficulty or aspiration. Short Term Goals:   Pt to be seen 1-3x/week during LOS or until goals met  1. Following education, staff/caregivers will demonstrate safe feeding strategies for safe and efficient swallow of recommended diet in all given opportunities. 2.  Pt will tolerate the recommended diet level with no s/s of aspiration. Prognosis for improvements is: Good, Family/Community Support      Pain Assessment:  Patient does not appear in pain. Pain Re-assessment:  Patient does not appear in pain. Safety Devices: All fall risk precautions in place      Radiologist:  Dr. Iveth Perez present for exam; Radiology Tech present    Treatment goals were discussed with the:   No education provided at this time. Patient demonstrates no evidence of learning. Thank you for your referral.  Please direct any questions or concerns to Speech Pathology. Images are available through [facility]/PACS. Please see radiologist report for additional details. Therapy Time  SLP Individual Minutes  Time In: 1330  Time Out: 7091  Minutes: 8            Signature:  Electronically signed by Cashmere Papa.  Veto Sauceda SLP on 10/18/2022 at 3:01 PM

## 2022-10-18 NOTE — PROGRESS NOTES
1450 - Urine specimen obtained. Vitals per flow sheet. Speech therapist that did Modified Barium Swallow recommends pt's diet to stay pureed and liquids nectar thick.

## 2022-10-18 NOTE — PROGRESS NOTES
Pt nonverbal. Hx MRDD. Pt blind. Pt takes pills whole in AS. Tolerated well. Pt incont of urine. Sandra care given 3 times so far this shift. Pt turned q2 hours. Zinc applied as pts bottom is red but intact. Lactic drawn and taken down to lab @ . 2000. Result 2.7. Next lactic @ 0600. Last temp  98.8 . Pt appears comfortable. Resting in bed with eyes closed at this time. O2 @ 2l. 96%. George light in reach and bed alarm on.

## 2022-10-18 NOTE — PROGRESS NOTES
6841 - Ammonia specimen drawn and placed on ice. Per  Jessica Guajardo, CBC and BMP can be added on. No orders for today - received verbal order from Dr. Wilfredo Rios. 1110 - Verbal order from Dr. Wilfredo Rios to do straight cath for a UA.

## 2022-10-18 NOTE — PROGRESS NOTES
Patient is up in chair. She is being fed by her nurse. Patient is on. Diet. Patient had fever yesterday. No skin rash or erythema. No seizure or convulsion. No vomiting. ROS: 12 system review otherwise is negative for acute signs or symptoms over the last 24 hrs. Exam: Awake, significant functional and cognitive loss. Unknown baseline state, at baseline according to report. HEENT: Pink conjunctiva and buccal mucosa. Normal and throat and nose  Neck: Supple, no nuchal rigidity. Endo: No thyromegaly. Vascular: No JVD or carotid bruit. Chest: Bilateral fine wheezing. Poor respiratory drive. Heart: Regular rate and rhythm, no extra sounds. No murmur, no rub. Abdomen: Soft, no tenderness, no rebound, no rigidity. Clinically I could not exclude the possibility of intra-abdominal mass or organomegaly. LE: No cyanosis or clubbing, no varices or edema. Neuro: Awake, significant cognitive loss. Unable to answer any questions. Unable to follow commands. Unable to sit up or stand up. MS: No joint effusion or tenderness. Skin: No skin rash, itching, bruising or significant findings. Assessment and plan:      *Suspect aspiration pneumonia based on worsening wheezing and difficulty swallowing as per nursing staff. *Hypoxemia, hypoxic respiratory failure, probably secondary to above. *SIRS present on admission     *MRDD. Her PPS is 20%. ECOG score is 4. BRYSON score is 0. Fast score is 7D-E.     *Seizure disorder. *Thrombocytopenia, unknown etiology. Patient was started on Lovenox. Hold Lovenox at this time and monitor platelets count. We will check for HIT if platelets continue to drop. *Elevated dimer. No pulmonary embolism on CT. Requested venous study. That came back negative for DVT. *Lung nodule. Repeat CAT scan in 6 to 12 months. *Multiple other medical problems not listed above.      Plan:  Changed the antibiotic to Zosyn to cover anaerobes/aspiration monitor. Patient was seen by speech who recommended a purée with nectar thick liquid. Also recommended MBS to be done today. Hold Lovenox. If her platelets continue to drop we will check for HIT  Check valproic acid. Check ammonia level. Consider CT head. Venous legs to rule out DVT. Negative for DVT. Repeat CAT scan of the chest in 6 months. We will discuss with guardian goals of care. Overall patient has poor prognosis given the degree of her cognitive and functional disability. Consider hospice care.

## 2022-10-19 ENCOUNTER — HOSPITAL ENCOUNTER (INPATIENT)
Age: 82
LOS: 1 days | Discharge: HOME | DRG: 179 | End: 2022-10-21
Attending: EMERGENCY MEDICINE | Admitting: INTERNAL MEDICINE
Payer: MEDICARE

## 2022-10-19 ENCOUNTER — APPOINTMENT (OUTPATIENT)
Dept: CT IMAGING | Age: 82
DRG: 179 | End: 2022-10-19
Payer: MEDICARE

## 2022-10-19 VITALS
SYSTOLIC BLOOD PRESSURE: 107 MMHG | BODY MASS INDEX: 27.27 KG/M2 | WEIGHT: 163.7 LBS | RESPIRATION RATE: 22 BRPM | HEART RATE: 80 BPM | OXYGEN SATURATION: 95 % | HEIGHT: 65 IN | TEMPERATURE: 98.5 F | DIASTOLIC BLOOD PRESSURE: 48 MMHG

## 2022-10-19 DIAGNOSIS — R09.02 HYPOXIA: ICD-10-CM

## 2022-10-19 DIAGNOSIS — J18.9 PNEUMONIA DUE TO INFECTIOUS ORGANISM, UNSPECIFIED LATERALITY, UNSPECIFIED PART OF LUNG: Primary | ICD-10-CM

## 2022-10-19 LAB
ALBUMIN SERPL-MCNC: 3.4 G/DL (ref 3.5–4.6)
ALP BLD-CCNC: 65 U/L (ref 40–130)
ALT SERPL-CCNC: 16 U/L (ref 0–33)
ANION GAP SERPL CALCULATED.3IONS-SCNC: 6 MEQ/L (ref 9–15)
AST SERPL-CCNC: 23 U/L (ref 0–35)
BASE EXCESS ARTERIAL: 3 (ref -3–3)
BASOPHILS ABSOLUTE: 0 K/UL (ref 0–0.1)
BASOPHILS ABSOLUTE: 0 K/UL (ref 0–0.2)
BASOPHILS RELATIVE PERCENT: 0.6 %
BASOPHILS RELATIVE PERCENT: 0.6 % (ref 0.1–1.2)
BILIRUB SERPL-MCNC: 0.5 MG/DL (ref 0.2–0.7)
BUN BLDV-MCNC: 16 MG/DL (ref 8–23)
CALCIUM IONIZED: 1.2 MMOL/L (ref 1.12–1.32)
CALCIUM SERPL-MCNC: 8.5 MG/DL (ref 8.5–9.9)
CHLORIDE BLD-SCNC: 102 MEQ/L (ref 95–107)
CO2: 33 MEQ/L (ref 20–31)
CREAT SERPL-MCNC: 0.46 MG/DL (ref 0.5–0.9)
EOSINOPHILS ABSOLUTE: 0.1 K/UL (ref 0–0.4)
EOSINOPHILS ABSOLUTE: 0.1 K/UL (ref 0–0.7)
EOSINOPHILS RELATIVE PERCENT: 2.3 % (ref 0.7–5.8)
EOSINOPHILS RELATIVE PERCENT: 2.4 %
GFR SERPL CREATININE-BSD FRML MDRD: >60 ML/MIN/{1.73_M2}
GLOBULIN: 2.7 G/DL (ref 2.3–3.5)
GLUCOSE BLD-MCNC: 82 MG/DL (ref 70–99)
GLUCOSE BLD-MCNC: 84 MG/DL (ref 70–99)
HCO3 ARTERIAL: 27.7 MMOL/L (ref 21–29)
HCT VFR BLD CALC: 33.3 % (ref 37–47)
HCT VFR BLD CALC: 35.3 % (ref 37–47)
HEMOGLOBIN: 11.1 G/DL (ref 11.2–15.7)
HEMOGLOBIN: 11.6 G/DL (ref 12–16)
HEMOGLOBIN: 11.9 GM/DL (ref 12–16)
IMMATURE GRANULOCYTES #: 0 K/UL
IMMATURE GRANULOCYTES %: 0.4 %
LACTATE: 1 MMOL/L (ref 0.4–2)
LACTIC ACID: 1.8 MMOL/L (ref 0.5–2.2)
LACTIC ACID: 2 MMOL/L (ref 0.5–2.2)
LYMPHOCYTES ABSOLUTE: 1.5 K/UL (ref 1.2–3.7)
LYMPHOCYTES ABSOLUTE: 1.8 K/UL (ref 1–4.8)
LYMPHOCYTES RELATIVE PERCENT: 28.7 %
LYMPHOCYTES RELATIVE PERCENT: 30 %
MAGNESIUM: 2.3 MG/DL (ref 1.7–2.4)
MCH RBC QN AUTO: 31.7 PG (ref 27–31.3)
MCH RBC QN AUTO: 32.5 PG (ref 25.6–32.2)
MCHC RBC AUTO-ENTMCNC: 32.8 % (ref 33–37)
MCHC RBC AUTO-ENTMCNC: 33.3 % (ref 32.2–35.5)
MCV RBC AUTO: 96.6 FL (ref 79.4–94.8)
MCV RBC AUTO: 97.4 FL (ref 79.4–94.8)
MONOCYTES ABSOLUTE: 0.5 K/UL (ref 0.2–0.9)
MONOCYTES ABSOLUTE: 0.7 K/UL (ref 0.2–0.8)
MONOCYTES RELATIVE PERCENT: 10.3 % (ref 4.7–12.5)
MONOCYTES RELATIVE PERCENT: 11.5 %
NEUTROPHILS ABSOLUTE: 3 K/UL (ref 1.6–6.1)
NEUTROPHILS ABSOLUTE: 3.4 K/UL (ref 1.4–6.5)
NEUTROPHILS RELATIVE PERCENT: 55.5 %
NEUTROPHILS RELATIVE PERCENT: 57.7 % (ref 34–71.1)
O2 SAT, ARTERIAL: 90 % (ref 93–100)
PCO2 ARTERIAL: 42 MM HG (ref 35–45)
PDW BLD-RTO: 12.5 % (ref 11.7–14.4)
PDW BLD-RTO: 13.3 % (ref 11.5–14.5)
PERFORMED ON: ABNORMAL
PH ARTERIAL: 7.43 (ref 7.35–7.45)
PLATELET # BLD: 111 K/UL (ref 130–400)
PLATELET # BLD: 91 K/UL (ref 182–369)
PO2 ARTERIAL: 57 MM HG (ref 75–108)
POC CHLORIDE: 104 MEQ/L (ref 99–110)
POC CREATININE: 0.4 MG/DL (ref 0.6–1.2)
POC FIO2: 21
POC HEMATOCRIT: 35 % (ref 36–48)
POC POTASSIUM: 3.9 MEQ/L (ref 3.5–5.1)
POC SAMPLE TYPE: ABNORMAL
POC SODIUM: 141 MEQ/L (ref 136–145)
POTASSIUM SERPL-SCNC: 3.9 MEQ/L (ref 3.4–4.9)
RBC # BLD: 3.42 M/UL (ref 3.93–5.22)
RBC # BLD: 3.66 M/UL (ref 4.2–5.4)
SARS-COV-2, NAAT: NOT DETECTED
SODIUM BLD-SCNC: 141 MEQ/L (ref 135–144)
TCO2 ARTERIAL: 29 MMOL/L (ref 21–32)
TOTAL PROTEIN: 6.1 G/DL (ref 6.3–8)
TROPONIN: <0.01 NG/ML (ref 0–0.01)
WBC # BLD: 5.2 K/UL (ref 4–10)
WBC # BLD: 6.1 K/UL (ref 4.8–10.8)

## 2022-10-19 PROCEDURE — G0378 HOSPITAL OBSERVATION PER HR: HCPCS | Performed by: INTERNAL MEDICINE

## 2022-10-19 PROCEDURE — 99285 EMERGENCY DEPT VISIT HI MDM: CPT

## 2022-10-19 PROCEDURE — 96374 THER/PROPH/DIAG INJ IV PUSH: CPT

## 2022-10-19 PROCEDURE — 84295 ASSAY OF SERUM SODIUM: CPT

## 2022-10-19 PROCEDURE — 85025 COMPLETE CBC W/AUTO DIFF WBC: CPT

## 2022-10-19 PROCEDURE — G0378 HOSPITAL OBSERVATION PER HR: HCPCS

## 2022-10-19 PROCEDURE — 85014 HEMATOCRIT: CPT

## 2022-10-19 PROCEDURE — 96365 THER/PROPH/DIAG IV INF INIT: CPT

## 2022-10-19 PROCEDURE — 83735 ASSAY OF MAGNESIUM: CPT

## 2022-10-19 PROCEDURE — 71275 CT ANGIOGRAPHY CHEST: CPT

## 2022-10-19 PROCEDURE — 87040 BLOOD CULTURE FOR BACTERIA: CPT

## 2022-10-19 PROCEDURE — 83605 ASSAY OF LACTIC ACID: CPT

## 2022-10-19 PROCEDURE — 82330 ASSAY OF CALCIUM: CPT

## 2022-10-19 PROCEDURE — 6360000004 HC RX CONTRAST MEDICATION: Performed by: EMERGENCY MEDICINE

## 2022-10-19 PROCEDURE — 6360000004 HC RX CONTRAST MEDICATION: Performed by: INTERNAL MEDICINE

## 2022-10-19 PROCEDURE — 96375 TX/PRO/DX INJ NEW DRUG ADDON: CPT

## 2022-10-19 PROCEDURE — 87635 SARS-COV-2 COVID-19 AMP PRB: CPT

## 2022-10-19 PROCEDURE — 80053 COMPREHEN METABOLIC PANEL: CPT

## 2022-10-19 PROCEDURE — 2580000003 HC RX 258: Performed by: INTERNAL MEDICINE

## 2022-10-19 PROCEDURE — 74177 CT ABD & PELVIS W/CONTRAST: CPT

## 2022-10-19 PROCEDURE — 82803 BLOOD GASES ANY COMBINATION: CPT

## 2022-10-19 PROCEDURE — 2580000003 HC RX 258: Performed by: EMERGENCY MEDICINE

## 2022-10-19 PROCEDURE — 93005 ELECTROCARDIOGRAM TRACING: CPT | Performed by: EMERGENCY MEDICINE

## 2022-10-19 PROCEDURE — 82565 ASSAY OF CREATININE: CPT

## 2022-10-19 PROCEDURE — 6360000002 HC RX W HCPCS: Performed by: INTERNAL MEDICINE

## 2022-10-19 PROCEDURE — 96367 TX/PROPH/DG ADDL SEQ IV INF: CPT

## 2022-10-19 PROCEDURE — 36415 COLL VENOUS BLD VENIPUNCTURE: CPT

## 2022-10-19 PROCEDURE — 6360000002 HC RX W HCPCS: Performed by: PHYSICIAN ASSISTANT

## 2022-10-19 PROCEDURE — 36600 WITHDRAWAL OF ARTERIAL BLOOD: CPT

## 2022-10-19 PROCEDURE — 82435 ASSAY OF BLOOD CHLORIDE: CPT

## 2022-10-19 PROCEDURE — 6360000002 HC RX W HCPCS: Performed by: EMERGENCY MEDICINE

## 2022-10-19 PROCEDURE — 84484 ASSAY OF TROPONIN QUANT: CPT

## 2022-10-19 PROCEDURE — 84132 ASSAY OF SERUM POTASSIUM: CPT

## 2022-10-19 PROCEDURE — 96372 THER/PROPH/DIAG INJ SC/IM: CPT

## 2022-10-19 PROCEDURE — 6370000000 HC RX 637 (ALT 250 FOR IP): Performed by: INTERNAL MEDICINE

## 2022-10-19 RX ORDER — BISACODYL 10 MG
10 SUPPOSITORY, RECTAL RECTAL DAILY PRN
Qty: 20 SUPPOSITORY | Refills: 1 | Status: SHIPPED | OUTPATIENT
Start: 2022-10-19 | End: 2022-11-18

## 2022-10-19 RX ORDER — SENNA PLUS 8.6 MG/1
2 TABLET ORAL DAILY
Qty: 60 TABLET | Refills: 0 | Status: SHIPPED | OUTPATIENT
Start: 2022-10-20 | End: 2022-10-19 | Stop reason: SDUPTHER

## 2022-10-19 RX ORDER — AMOXICILLIN AND CLAVULANATE POTASSIUM 250; 62.5 MG/5ML; MG/5ML
500 POWDER, FOR SUSPENSION ORAL 2 TIMES DAILY
Qty: 140 ML | Refills: 0 | Status: ON HOLD | OUTPATIENT
Start: 2022-10-19 | End: 2022-10-20

## 2022-10-19 RX ORDER — BUSPIRONE HYDROCHLORIDE 7.5 MG/1
7.5 TABLET ORAL 2 TIMES DAILY
Qty: 40 TABLET | Refills: 1 | Status: SHIPPED | OUTPATIENT
Start: 2022-10-19

## 2022-10-19 RX ORDER — SENNA PLUS 8.6 MG/1
2 TABLET ORAL DAILY
Qty: 40 TABLET | Refills: 1 | Status: SHIPPED | OUTPATIENT
Start: 2022-10-20 | End: 2022-11-19

## 2022-10-19 RX ORDER — AMOXICILLIN AND CLAVULANATE POTASSIUM 250; 62.5 MG/5ML; MG/5ML
500 POWDER, FOR SUSPENSION ORAL 2 TIMES DAILY
Qty: 140 ML | Refills: 0 | Status: SHIPPED | OUTPATIENT
Start: 2022-10-19 | End: 2022-10-19 | Stop reason: SDUPTHER

## 2022-10-19 RX ORDER — BUSPIRONE HYDROCHLORIDE 7.5 MG/1
30 TABLET ORAL 2 TIMES DAILY
Qty: 60 TABLET | Refills: 1 | Status: SHIPPED | OUTPATIENT
Start: 2022-10-19 | End: 2022-10-19 | Stop reason: SDUPTHER

## 2022-10-19 RX ORDER — BISACODYL 10 MG
10 SUPPOSITORY, RECTAL RECTAL DAILY PRN
Qty: 30 SUPPOSITORY | Refills: 0 | Status: SHIPPED | OUTPATIENT
Start: 2022-10-19 | End: 2022-10-19 | Stop reason: SDUPTHER

## 2022-10-19 RX ORDER — LORAZEPAM 2 MG/ML
1 INJECTION INTRAMUSCULAR ONCE
Status: COMPLETED | OUTPATIENT
Start: 2022-10-19 | End: 2022-10-19

## 2022-10-19 RX ORDER — CLONAZEPAM 1 MG/1
1 TABLET ORAL 3 TIMES DAILY
Refills: 0 | COMMUNITY
Start: 2022-10-19

## 2022-10-19 RX ORDER — HALOPERIDOL 5 MG/ML
5 INJECTION INTRAMUSCULAR ONCE
Status: COMPLETED | OUTPATIENT
Start: 2022-10-19 | End: 2022-10-19

## 2022-10-19 RX ORDER — 0.9 % SODIUM CHLORIDE 0.9 %
1000 INTRAVENOUS SOLUTION INTRAVENOUS ONCE
Status: COMPLETED | OUTPATIENT
Start: 2022-10-19 | End: 2022-10-19

## 2022-10-19 RX ADMIN — ACETAMINOPHEN 650 MG: 325 TABLET ORAL at 09:57

## 2022-10-19 RX ADMIN — CLONAZEPAM 1 MG: 0.5 TABLET ORAL at 09:57

## 2022-10-19 RX ADMIN — SODIUM CHLORIDE 1000 ML: 9 INJECTION, SOLUTION INTRAVENOUS at 16:14

## 2022-10-19 RX ADMIN — IOPAMIDOL 50 ML: 612 INJECTION, SOLUTION INTRAVENOUS at 20:06

## 2022-10-19 RX ADMIN — DIVALPROEX SODIUM 500 MG: 500 TABLET, FILM COATED, EXTENDED RELEASE ORAL at 09:57

## 2022-10-19 RX ADMIN — IOPAMIDOL 75 ML: 612 INJECTION, SOLUTION INTRAVENOUS at 18:47

## 2022-10-19 RX ADMIN — PIPERACILLIN AND TAZOBACTAM 3375 MG: 3; .375 INJECTION, POWDER, FOR SOLUTION INTRAVENOUS at 10:01

## 2022-10-19 RX ADMIN — PIPERACILLIN AND TAZOBACTAM 3375 MG: 3; .375 INJECTION, POWDER, FOR SOLUTION INTRAVENOUS at 19:28

## 2022-10-19 RX ADMIN — HALOPERIDOL LACTATE 5 MG: 5 INJECTION, SOLUTION INTRAMUSCULAR at 18:09

## 2022-10-19 RX ADMIN — SODIUM CHLORIDE, PRESERVATIVE FREE 10 ML: 5 INJECTION INTRAVENOUS at 09:58

## 2022-10-19 RX ADMIN — VANCOMYCIN HYDROCHLORIDE 1000 MG: 1 INJECTION, POWDER, LYOPHILIZED, FOR SOLUTION INTRAVENOUS at 21:21

## 2022-10-19 RX ADMIN — ASPIRIN 81 MG CHEWABLE TABLET 81 MG: 81 TABLET CHEWABLE at 09:56

## 2022-10-19 RX ADMIN — QUETIAPINE FUMARATE 50 MG: 25 TABLET ORAL at 09:57

## 2022-10-19 RX ADMIN — LORAZEPAM 1 MG: 2 INJECTION INTRAMUSCULAR; INTRAVENOUS at 18:06

## 2022-10-19 ASSESSMENT — PAIN SCALES - PAIN ASSESSMENT IN ADVANCED DEMENTIA (PAINAD)
BODYLANGUAGE: 1
FACIALEXPRESSION: 0
BREATHING: 0
CONSOLABILITY: 1

## 2022-10-19 ASSESSMENT — PAIN SCALES - WONG BAKER
WONGBAKER_NUMERICALRESPONSE: 0

## 2022-10-19 NOTE — DISCHARGE INSTRUCTIONS
Patient is [facility]. Patient is a DNR CC    Please follow modified diet which is purée with nectar thick liquid. No straw. Supervision when eating. Meds crushed in puréed food.     Patient is DNR CC.

## 2022-10-19 NOTE — DISCHARGE SUMMARY
Hospital Medicine Discharge Summary    Alec Hopkins  :  1940  MRN:  [id number]    Admit date:  10/15/2022  Discharge date:  10/19/2022    Admitting Physician:  Chiquita Bustos MD  Primary Care Physician:  Trish Boo MD      Discharge Diagnoses:      *Micro aspiration pneumonia / pneumonitis based on worsening wheezing and difficulty swallowing as per nursing staff. *Hypoxemia, hypoxic respiratory failure, probably secondary to above. CTA did not show any pulmonary embolism or large infiltration. *Dysphagia. The patient was seen by speech and swallow team.  She had MBS. She is recommended to be on a puréed diet with nectar thick liquid. Meds crushed in purée diet. No straws. Supervised feed. *SIRS present on admission. Resolved. *MRDD. Her PPS is 20%. ECOG score is 4. BRYSON score is 0. Fast score is 7D-E.     *Seizure disorder. *Thrombocytopenia, unknown etiology. Lovenox was discontinued with. No evidence of head. The stabilization of platelets count. *Elevated dimer. No pulmonary embolism on CT. Requested venous study. That came back negative for DVT. *Lung nodule. Recommendation is to repeat CAT scan in 6 months however patient is a DNR CC. Her guardian does not want her to receive any diagnostic, investigative or therapeutic intervention other than what is intended for comfort. *Multiple other medical problems not listed above. Patient has multiple medical issues. I do not have clear or strong clinical justification to extend inpatient hospitalization. I called her guardian Shalini Keyes at [phone]. I gave her update on her disease, prognosis, expectation and trajectory. We discussed goals of care. Spent about 25 minutes. Claus Calderon does not want the patient to undergo any additional diagnostic, investigative or therapeutic intervention other than what is intended for comfort care. Comfort care only.   She wants to provide her with peace and dignity at the last phase of her life. Hospital Course:   Carlin Nation is a 80 y.o. female that was admitted and treated at [facility] for the aforementioned medical issues. Patient will be discharged back to the group home under [facility] and a modified diet. Exam: Awake, significant functional and cognitive loss. Unknown baseline state, at baseline according to report. HEENT: Pink conjunctiva and buccal mucosa. Normal and throat and nose  Neck: Supple, no nuchal rigidity. Endo: No thyromegaly. Vascular: No JVD or carotid bruit. Chest: Bilateral fine wheezing. Poor respiratory drive. Heart: Regular rate and rhythm, no extra sounds. No murmur, no rub. Abdomen: Soft, no tenderness, no rebound, no rigidity. Clinically I could not exclude the possibility of intra-abdominal mass or organomegaly. LE: No cyanosis or clubbing, no varices or edema. Neuro: Awake, significant cognitive loss. Unable to answer any questions. Unable to follow commands. Unable to sit up or stand up. MS: No joint effusion or tenderness. Skin: No skin rash, itching, bruising or significant findings. Patient was seen by the following consultants while admitted to [facility]:   Consults:  None    Significant Diagnostic Studies:    No results found. Discharge Medications:         Medication List        START taking these medications      amoxicillin-clavulanate 250-62.5 MG/5ML suspension  Commonly known as:  Augmentin  Take 10 mLs by mouth 2 times daily     bisacodyl 10 MG suppository  Commonly known as: DULCOLAX  Place 1 suppository rectally daily as needed for Constipation     senna 8.6 MG tablet  Commonly known as: SENOKOT  Take 2 tablets by mouth daily  Start taking on: October 20, 2022            CHANGE how you take these medications      busPIRone 7.5 MG tablet  Commonly known as: BUSPAR  Take 1 tablet by mouth 2 times daily  What changed:   medication strength  how much to take     clonazePAM 1 MG tablet  Commonly known as: Florentino Essex  What changed:   when to take this  reasons to take this     divalproex 250 MG DR tablet  Commonly known as: DEPAKOTE  What changed: Another medication with the same name was removed. Continue taking this medication, and follow the directions you see here.             CONTINUE taking these medications      acetaminophen 325 MG tablet  Commonly known as: TYLENOL     albuterol (2.5 MG/3ML) 0.083% nebulizer solution  Commonly known as: PROVENTIL     aspirin 81 MG tablet     calcium-vitamin D 500-200 MG-UNIT per tablet  Commonly known as: OSCAL-500     chlorhexidine 0.12 % solution  Commonly known as: PERIDEX     docusate sodium 100 MG capsule  Commonly known as: COLACE     fleet 7-19 GM/118ML     MILK OF MAGNESIA PO     MULTIVITAMIN PO     OXYGEN     PROBIOTIC PO     Protein Powd     QUEtiapine 100 MG tablet  Commonly known as: SEROQUEL     Reguloid 58.6 % powder  Generic drug: psyllium     vitamin D 1.25 MG (01416 UT) Caps capsule  Commonly known as: ERGOCALCIFEROL            STOP taking these medications      atorvastatin 40 MG tablet  Commonly known as: LIPITOR     atropine 1 % ophthalmic solution     carbamide peroxide 6.5 % otic solution  Commonly known as: DEBROX     Cetirizine HCl 10 MG Caps     chapstick Stck     fluticasone 50 MCG/ACT nasal spray  Commonly known as: FLONASE     ibuprofen 200 MG tablet  Commonly known as: ADVIL;MOTRIN     lactulose 10 GM/15ML solution  Commonly known as: CHRONULAC     lactulose encephalopathy 10 GM/15ML Soln solution     mupirocin 2 % ointment  Commonly known as: BACTROBAN     neomycin-bacitracin-polymyxin 5-400-5000 ointment     niacin 500 MG extended release tablet  Commonly known as: NIASPAN     Preparation H 1 % cream  Generic drug: hydrocortisone     pseudoephedrine-codeine-guaiFENesin 30- MG/5ML solution  Commonly known as: MYTUSSIN DAC     REFRESH OP     simvastatin 20 MG tablet  Commonly known as: ZOCOR     sodium chloride 0.65 % nasal spray  Commonly known as: ROB, BABY AYR     zinc oxide 13 % Crea               Where to Get Your Medications        These medications were sent to [address] [place] [address] [address] [facility], [address] [facility] [address], [address] [address]      Phone: [phone]   amoxicillin-clavulanate 250-62.5 MG/5ML suspension  bisacodyl 10 MG suppository  busPIRone 7.5 MG tablet  senna 8.6 MG tablet         Disposition:   Discharged togroup Home. Any [facility] needs that were indicated and/or required as been addressed and set up by Social Work. Condition at discharge: Pt was medically stable at the time of discharge. Significant improvement in clinical condition compared to initial condition at presentation to hospital    Activity: activity as tolerated, fall precautions. Total time taken for discharging this patient: 40 minutes. Greater than 70% of time was spent focused exclusively on this patient. Time was taken to review chart, discuss plans with consultants, reconciling medications, discussing plan answering questions with patient. Lluvia Hayes MD  10/19/2022, 11:32 AM  ----------------------------------------------------------------------------------------------------------------------    Jori Neri,     Please return to ER or call 911 if you develop any significant signs or symptoms. I may not have addressed all of your medical illnesses or the abnormal blood work or imaging therefore please ask your PCP Nandini Gr MD and other out patient specialists and providers  to obtain [facility] record entirely to follow up on all of the abnormal labs, imaging and findings that I have and have not addressed during your hospitalization. Discharging you from the hospital does not mean that your medical care ends here and now.  You may still need additional work up, investigation, monitoring, and treatment to be handled from this point on by outside providers including your PCP, Tristian Painting MD , Specialists and other healthcare providers. Please review your list of discharge medications prior to resuming medications you might still have at home, as the medications you need to be taking, dosages or how often you must take them may have changed. For medication questions, contact your retail pharmacy and your PCP, Tristian Painting MD .     ** I STRONGLY RECOMMEND that you follow up with Tristian Painting MD within 3 to 5 days for a post hospitalization evaluation. This specific office visit is covered by your insurance, and is not the same as your annual doctor visit/ check up. This office visit is important, as it may prevent need for repeat and/or future hospitalizations. **    Your medical team at [facility]) appreciates the opportunity to work with you to get well! Sincerely,  Bandar Aragon MD Follow up with Dr. Tristian Painting MD in the next 7 days or sooner if needed. Please return to ER or call 911 if you develop any significant signs or symptoms. I may or may not have addressed all of your symptoms, medical issues,  Illnesses, or all of the abnormal blood work or imaging therefore please ask your PCP and other specialists to obtain [facility] record entirely to follow up on all of your symptoms, illnesses, abnormal labs, imaging and findings that I have and have not addressed during your hospitalization. Discharging you from the hospital does not mean that your medical care ends here and now. You may still need to have additional work up, investigation, monitoring, surveillance, and treatment to be handled from this point on by in the out patient setting by  out patient providers including your PCP, Specialists and other healthcare providers. For medication questions, contact your retail pharmacy and your PCP. Your medical team at [facility]) appreciates the opportunity to work with you to get well!     Bandar Aragon MD

## 2022-10-19 NOTE — DISCHARGE INSTR - COC
Continuity of Care Form    Patient Name: Ernestina Ohara   :  1940  MRN:  [id number]    Admit date:  10/15/2022  Discharge date:  ***    Code Status Order: DNR-CC   Advance Directives:     Admitting Physician:  Saray Oglesby MD  PCP: Gary Anderson MD    Discharging Nurse: [facility] Unit/Room#: 0206/0206-01  Discharging Unit Phone Number: ***    Emergency Contact:   Extended Emergency Contact Information  Primary Emergency Contact: Zonia Kahn of [address] [place] [address] Phone: [phone]  Relation: Legal Guardian  Secondary Emergency Contact: Carson Germain RN  Address: [address] [place] [address] Campos Najera of [address] [place] [address] Phone: [phone]  Mobile Phone: [phone]  Relation: None    Past Surgical History:  Past Surgical History:   Procedure Laterality Date    COLONOSCOPY N/A 3/22/2019    COLONOSCOPY performed by Merrill Franco MD at [facility]       Immunization History:   Immunization History   Administered Date(s) Administered    BCG (Kristinamary Mendozan) 2008, 2009, 2010, 2011, 2012, 2013, 03/10/2015, 2016, 2017    COVID-19, [facility] border, Primary or Immunocompromised, (age 12y+), IM, 100 mcg/0.5mL 2021, 2021    Hepatitis A 1984    Hepatitis B 1984, 2017    Hepatitis B Adol 2 Dose (Recombivax HB) 1984, 2017    Influenza Vaccine, unspecified formulation 10/11/2012, 10/18/2013, 10/26/2014, 10/23/2015, 2016, 10/25/2018    Influenza Virus Vaccine 10/20/2010, 10/11/2012, 10/18/2013, 10/26/2014, 10/23/2015, 2016, 10/25/2018    Influenza Whole 10/30/2010, 10/30/2011, 10/30/2012    Influenza, High Dose (Fluzone 65 yrs and older) 2017    Pneumococcal Conjugate 13-valent (Rmobsxi01) 10/10/2017    Pneumococcal Conjugate 7-valent (Prevnar7) 10/11/2004    Pneumococcal Polysaccharide (Qbajdyied49) 10/11/2004, 2010    Pneumococcal Vaccine 10/11/2004 Td (Adult), 5 Lf Tetanus Toxoid, Pf (Tenivac, Decavac) 05/15/2008    Tdap (Boostrix, Adacel) 05/15/2008, 2018    Tetanus 05/15/2008    Zoster Live (Zostavax) 2017    Zoster Recombinant (Shingrix) 2019, 2020       Active Problems:  Patient Active Problem List   Diagnosis Code    MR (mental retardation) F79    Seizure disorder ([facility] [address] G40.909    Legally blind H54.8    Chronic rhinitis J31.0    Mood disorder ([facility] [address] F39    Vitamin D deficiency E55.9    Full code status Z78.9    Dietary restriction Z71.3    Dyslipidemia E78.5    Periodontal disease K05.6    Mental retardation F79    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, 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    Leucopenia D72.819    Aphakia, left eye H27.02    Hyphema, 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    Pneumonia due to COVID-19 virus U07.1, J12.82    Other thrombophilia ([facility]) D68.69    Sepsis ([facility]) A41.9    Aspiration of food, sequela T17.928S       Isolation/Infection:   Isolation            No Isolation          Patient Infection Status       Infection Onset Added Last Indicated Last Indicated By Review Planned Expiration Resolved Resolved By    None active    Resolved    COVID-19 (Rule Out) 10/15/22 10/15/22 10/15/22 COVID-19, Rapid (Ordered)   10/15/22 Rule-Out Test Resulted    COVID-19 20 COVID-19   20     COVID-19 (Rule Out) 20 COVID-19 (Ordered)   20 Rule-Out Test Resulted            Nurse Assessment:  Last Vital Signs: BP (!) 107/48   Pulse 80   Temp 98.5 °F (36.9 °C) (Rectal)   Resp 22   Ht 5' 5\" (1.651 m)   Wt 163 lb 11.2 oz (74.3 kg)   SpO2 95%   BMI 27.24 kg/m²     Last documented pain score (0-10 scale):    Last Weight:   Wt Readings from Last 1 Encounters:   10/16/22 163 lb 11.2 oz (74.3 kg)     Mental Status:  {IP PT MENTAL STATUS:}    IV Access:  {[facility] IV ACCESS:742538928}    Nursing Mobility/ADLs:  Walking   {BRITNEY DUMONT XOCC:422879893}  Transfer  {[facility] DME ZFAY:035528103}  Bathing  {[facility] DME MGYM:387924604}  Dressing  {[facility] DME HAMJ:944297122}  Toileting  {[facility] DME HOBB:445606004}  Feeding  {[facility] DME UJUQ:036846330}  Med Admin  {[facility] DME CFXY:838012409}  Med Delivery   { MILLA MED Delivery:139634564}    Wound Care Documentation and Therapy:        Elimination:  Continence: Bowel: {YES / GL:39858}  Bladder: {YES / YY:07209}  Urinary Catheter: {Urinary Catheter:566861955}   Colostomy/Ileostomy/Ileal Conduit: {YES / QB:22250}       Date of Last BM: ***    Intake/Output Summary (Last 24 hours) at 10/19/2022 1119  Last data filed at 10/18/2022 2200  Gross per 24 hour   Intake 360 ml   Output --   Net 360 ml     I/O last 3 completed shifts:   In: 5 [P.O.:420]  Out: -     Safety Concerns:     [address] Safety Concerns:036036668}    Impairments/Disabilities:      [address] Impairments/Disabilities:052367060}    Nutrition Therapy:  Current Nutrition Therapy:   [address] [facility] Diet List:221790764}    Routes of Feeding: {[facility] DME Other Feedings:354148131}  Liquids: {Slp liquid thickness:19157}  Daily Fluid Restriction: {[facility] DME Yes amt example:226488225}  Last Modified Barium Swallow with Video (Video Swallowing Test): {Done Not Done OHKU:267964211}    Treatments at the Time of Hospital Discharge:   Respiratory Treatments: ***  Oxygen Therapy:  {Therapy; copd oxygen:61143}  Ventilator:    { CC Vent XCXX:924357830}    Rehab Therapies: {THERAPEUTIC INTERVENTION:0830400468}  Weight Bearing Status/Restrictions: [employer] Weight Bearin}  Other Medical Equipment (for information only, NOT a DME order):  {EQUIPMENT:658442492}  Other Treatments: ***    Patient's personal belongings (please select all that are sent with patient):  {[facility] DME Belongings:728323912}    RN SIGNATURE:  {Esignature:764703065}    CASE MANAGEMENT/SOCIAL WORK SECTION    Inpatient Status Date: ***    Readmission Risk Assessment Score:  Readmission Risk              Risk of Unplanned Readmission:  13           Discharging to Facility/ Agency   Name:   Address:  Phone:  Fax:    Dialysis Facility (if applicable)   Name:  Address:  Dialysis Schedule:  Phone:  Fax:    / signature: {Esignature:025486290}    PHYSICIAN SECTION    Prognosis: {Prognosis:8137392538}    Condition at Discharge: Richard Diane Patient Condition:264704659}    Rehab Potential (if transferring to Rehab): {Prognosis:4810968838}    Recommended Labs or Other Treatments After Discharge: ***    Physician Certification: I certify the above information and transfer of Rosy Bojorquez  is necessary for the continuing treatment of the diagnosis listed and that she requires {Admit to Appropriate Level of Care:41993} for {GREATER/LESS:894463614} 30 days.      Update Admission H&P: {[facility] DME Changes in TMQDB:992047866}    PHYSICIAN SIGNATURE:  {Esignature:057794027}

## 2022-10-19 NOTE — ED PROVIDER NOTES
[facility] ED  eMERGENCYdEPARTMENT eNCOUnter      Pt Name: John Mauro  MRN: [id number]  Ame 1940  Date of evaluation: 10/19/2022  Rip Sanchez MD    CHIEF COMPLAINT           HPI  John Mauro is a 80 y.o. female per chart review has a h/o MR who is baseline non verbal, blindness, HTN, hpl, seizures presents to the ED with hypoxia. Per group home, pt noted to have an oxygen sat of 80s. Pt unable to state if she is sob or in pain. ROS  Review of Systems   Unable to perform ROS: Patient nonverbal     Except as noted above the remainder of the review of systems was reviewed and negative. PAST MEDICAL HISTORY     Past Medical History:   Diagnosis Date    Ceruminosis     Chronic rhinitis     Dyslipidemia     History of encephalitis     Large bowel obstruction (HCC)     Legally blind     Mood disorder ([facility] [address]     MR (mental retardation)     Osteoporosis     Periodontal disease     Pneumonia 12/9/2018    SBO (small bowel obstruction) ([facility] [address] 3/22/2019    Seizure disorder ([facility] [address]     Vitamin D deficiency          SURGICAL HISTORY       Past Surgical History:   Procedure Laterality Date    COLONOSCOPY N/A 3/22/2019    COLONOSCOPY performed by Tish Goyal MD at [address] [place] [address]       Previous Medications    ACETAMINOPHEN (TYLENOL) 325 MG TABLET    Take 650 mg by mouth every 4 hours as needed. May give rectal if unable to tolerate PO administration.   Indications: Fever, Pain    ALBUTEROL (PROVENTIL) (2.5 MG/3ML) 0.083% NEBULIZER SOLUTION    Take 2.5 mg by nebulization every 6 hours as needed for Wheezing    AMOXICILLIN-CLAVULANATE (AUGMENTIN) 250-62.5 MG/5ML SUSPENSION    Take 10 mLs by mouth 2 times daily    ASPIRIN 81 MG TABLET    Take 81 mg by mouth daily    BISACODYL (DULCOLAX) 10 MG SUPPOSITORY    Place 1 suppository rectally daily as needed for Constipation    BUSPIRONE (BUSPAR) 7.5 MG TABLET    Take 1 tablet by mouth 2 times daily CALCIUM-VITAMIN D (OSCAL-500) 500-200 MG-UNIT PER TABLET    Take 1 tablet by mouth 2 times daily. Administer with food. Indications: supplement    CHLORHEXIDINE (PERIDEX) 0.12 % SOLUTION    Take 15 mLs by mouth 2 times daily Apply to gums. CLONAZEPAM (KLONOPIN) 1 MG TABLET    Take 1 tablet by mouth 3 times daily as needed for Anxiety. Indications: Agitation, Feeling Anxious, Trouble Sleeping    DIVALPROEX (DEPAKOTE) 250 MG DR TABLET    Take 500 mg by mouth 2 times daily     DOCUSATE SODIUM (COLACE) 100 MG CAPSULE    Take 100 mg by mouth daily Indications: Chronic Constipation Give 2 capsules daily. Hold for loose stools. MAGNESIUM HYDROXIDE (MILK OF MAGNESIA PO)    Take 30 mLs by mouth daily as needed. Indications: Constipation    MULTIPLE VITAMIN (MULTIVITAMIN PO)    Take 1 tablet by mouth daily. Indications: multivitamin    OXYGEN    Inhale 2 L/min into the lungs as needed. ADMINISTER VIA N/C.  TITRATE PRN TO MAINTAIN PULSE OX ABOVE 92%. Indications: Hypoxia    PROBIOTIC PRODUCT (PROBIOTIC PO)    Take 1 capsule by mouth 2 times daily as needed     PROTEIN POWD    Take by mouth Give 1 teaspoon (5ml) in water/juice BID    PSYLLIUM (REGULOID) 58.6 % POWDER    Take 1 packet by mouth 3 times daily Take by mouth 3 times daily. QUETIAPINE (SEROQUEL) 100 MG TABLET    Take 50 mg by mouth 3 times daily    SENNA (SENOKOT) 8.6 MG TABLET    Take 2 tablets by mouth daily    SODIUM PHOSPHATES (FLEET)    Place 1 enema rectally daily as needed. Indications: Constipation    VITAMIN D (ERGOCALCIFEROL) 57957 UNITS CAPS CAPSULE    Take 50,000 Units by mouth once a week Indications: Vitamin D Deficiency On Mondays       ALLERGIES     Patient has no known allergies. FAMILY HISTORY     History reviewed. No pertinent family history.        SOCIAL HISTORY       Social History     Socioeconomic History    Marital status: Single     Spouse name: None    Number of children: None    Years of education: None    Highest education level: None   Tobacco Use    Smoking status: Never    Smokeless tobacco: Never   Vaping Use    Vaping Use: Never used   Substance and Sexual Activity    Alcohol use: No    Drug use: No    Sexual activity: Never   Social History Narrative    ** Merged History Encounter **              PHYSICAL EXAM       ED Triage Vitals   BP Temp Temp src Pulse Resp SpO2 Height Weight   -- -- -- -- -- -- -- --       Physical Exam  Vitals and nursing note reviewed. Constitutional:       Appearance: She is well-developed. HENT:      Head: Normocephalic. Right Ear: External ear normal.      Left Ear: External ear normal.   Eyes:      Conjunctiva/sclera: Conjunctivae normal.      Pupils: Pupils are equal, round, and reactive to light. Cardiovascular:      Rate and Rhythm: Normal rate and regular rhythm. Heart sounds: Normal heart sounds. Pulmonary:      Comments: Decreased breath sounds bilateral bases  Abdominal:      General: Bowel sounds are normal. There is no distension. Palpations: Abdomen is soft. Tenderness: no abdominal tenderness   Musculoskeletal:         General: Normal range of motion. Cervical back: Normal range of motion and neck supple. Skin:     General: Skin is warm and dry. Neurological:      Mental Status: She is alert and oriented to person, place, and time. Psychiatric:         Mood and Affect: Mood normal.         MDM  81 yo female presents to the ED with hypoxia. Pt is afebrile, hemodynamically stable. Pt given 1 L NS in the ED. EKG shows NSR with HR 84, normal axis, normal intervals, no ST changes. Pt noted to be 86% on RA. Pt placed on 2 L NC in the ED. Labs unremarkable. CT PE shows no PE but ZAINA and LLL, RLL pneumonia. Likely aspiration. I spoke to Clark Saenz the POA who wanted admission for oxygen and IV abx. Blood cultures drawn and pt given IV zosyn in the ED. Case then discussed with Dr. Bhavana Matute and pt admitted to medicine in stable condition. FINAL IMPRESSION      1. Pneumonia due to infectious organism, unspecified laterality, unspecified part of lung    2.  Hypoxia          DISPOSITION/PLAN   DISPOSITION Decision To Admit 10/19/2022 06:53:35 PM        DISCHARGE MEDICATIONS:  [unfilled]         Gilmar Ma MD(electronically signed)  Attending Emergency Physician            Gilmar Ma MD  10/19/22 2871

## 2022-10-19 NOTE — ED NOTES
Patient yelling out, pulling at her hair and hitting self in face and head, attempted to redirect multiple times. Patient medicated per physician prior to CT but behaviors continue.       Rachel Sanchez RN  10/19/22 1931

## 2022-10-19 NOTE — PROGRESS NOTES
Pt nonverbal. Makes grunting noises. Pt blind. Pt takes pills whole in AS. Tolerated well. Pt incont of urine and large soft  bm. Pt turned q2 hours. Zinc applied as pts bottom is red but intact. Last temp  98.0 rectal. . Pt appears comfortable. Resting in bed with eyes closed at this time. O2 @ 2l. 96%. George light in reach and bed alarm on. Regular Diet - No restrictions

## 2022-10-19 NOTE — DISCHARGE INSTR - DIET
Good nutrition is important when healing from an illness, injury, or surgery. Follow any nutrition recommendations given to you during your hospital stay. If you were given an oral nutrition supplement while in the hospital, continue to take this supplement at home. You can take it with meals, in-between meals, and/or before bedtime. These supplements can be purchased at most local grocery stores, pharmacies, and chain [employer]-stores. If you have any questions about your diet or nutrition, call the hospital and ask for the dietitian. Please follow modified diet. The purée with nectar thick liquid diet. Meds are crushed in purée. Supervised feeding. No straws.

## 2022-10-20 LAB
ALBUMIN SERPL-MCNC: 3.3 G/DL (ref 3.5–4.6)
ALP BLD-CCNC: 58 U/L (ref 40–130)
ALT SERPL-CCNC: <5 U/L (ref 0–33)
ANION GAP SERPL CALCULATED.3IONS-SCNC: 6 MEQ/L (ref 9–15)
AST SERPL-CCNC: 30 U/L (ref 0–35)
BASOPHILS ABSOLUTE: 0 K/UL (ref 0–0.2)
BASOPHILS RELATIVE PERCENT: 0.8 %
BILIRUB SERPL-MCNC: 0.6 MG/DL (ref 0.2–0.7)
BUN BLDV-MCNC: 8 MG/DL (ref 8–23)
CALCIUM SERPL-MCNC: 8.4 MG/DL (ref 8.5–9.9)
CHLORIDE BLD-SCNC: 101 MEQ/L (ref 95–107)
CO2: 29 MEQ/L (ref 20–31)
CREAT SERPL-MCNC: 0.41 MG/DL (ref 0.5–0.9)
EKG ATRIAL RATE: 84 BPM
EKG P AXIS: 34 DEGREES
EKG P-R INTERVAL: 162 MS
EKG Q-T INTERVAL: 412 MS
EKG QRS DURATION: 100 MS
EKG QTC CALCULATION (BAZETT): 486 MS
EKG R AXIS: -22 DEGREES
EKG T AXIS: -1 DEGREES
EKG VENTRICULAR RATE: 84 BPM
EOSINOPHILS ABSOLUTE: 0.2 K/UL (ref 0–0.7)
EOSINOPHILS RELATIVE PERCENT: 3.2 %
GFR SERPL CREATININE-BSD FRML MDRD: >60 ML/MIN/{1.73_M2}
GLOBULIN: 2.6 G/DL (ref 2.3–3.5)
GLUCOSE BLD-MCNC: 142 MG/DL (ref 70–99)
HCT VFR BLD CALC: 34.4 % (ref 37–47)
HEMOGLOBIN: 11.6 G/DL (ref 12–16)
LYMPHOCYTES ABSOLUTE: 1.5 K/UL (ref 1–4.8)
LYMPHOCYTES RELATIVE PERCENT: 27.8 %
MCH RBC QN AUTO: 32.5 PG (ref 27–31.3)
MCHC RBC AUTO-ENTMCNC: 33.8 % (ref 33–37)
MCV RBC AUTO: 96.3 FL (ref 79.4–94.8)
MONOCYTES ABSOLUTE: 0.7 K/UL (ref 0.2–0.8)
MONOCYTES RELATIVE PERCENT: 12.9 %
NEUTROPHILS ABSOLUTE: 2.9 K/UL (ref 1.4–6.5)
NEUTROPHILS RELATIVE PERCENT: 55.3 %
PDW BLD-RTO: 13.1 % (ref 11.5–14.5)
PLATELET # BLD: 119 K/UL (ref 130–400)
POTASSIUM SERPL-SCNC: 5.3 MEQ/L (ref 3.4–4.9)
RBC # BLD: 3.57 M/UL (ref 4.2–5.4)
SODIUM BLD-SCNC: 136 MEQ/L (ref 135–144)
TOTAL PROTEIN: 5.9 G/DL (ref 6.3–8)
WBC # BLD: 5.2 K/UL (ref 4.8–10.8)

## 2022-10-20 PROCEDURE — G0378 HOSPITAL OBSERVATION PER HR: HCPCS

## 2022-10-20 PROCEDURE — 6370000000 HC RX 637 (ALT 250 FOR IP): Performed by: INTERNAL MEDICINE

## 2022-10-20 PROCEDURE — 94664 DEMO&/EVAL PT USE INHALER: CPT

## 2022-10-20 PROCEDURE — 80053 COMPREHEN METABOLIC PANEL: CPT

## 2022-10-20 PROCEDURE — 2700000000 HC OXYGEN THERAPY PER DAY

## 2022-10-20 PROCEDURE — 92610 EVALUATE SWALLOWING FUNCTION: CPT

## 2022-10-20 PROCEDURE — 96372 THER/PROPH/DIAG INJ SC/IM: CPT

## 2022-10-20 PROCEDURE — 96366 THER/PROPH/DIAG IV INF ADDON: CPT

## 2022-10-20 PROCEDURE — 93010 ELECTROCARDIOGRAM REPORT: CPT | Performed by: INTERNAL MEDICINE

## 2022-10-20 PROCEDURE — 6360000002 HC RX W HCPCS: Performed by: INTERNAL MEDICINE

## 2022-10-20 PROCEDURE — 2580000003 HC RX 258: Performed by: INTERNAL MEDICINE

## 2022-10-20 PROCEDURE — 36415 COLL VENOUS BLD VENIPUNCTURE: CPT

## 2022-10-20 PROCEDURE — 85025 COMPLETE CBC W/AUTO DIFF WBC: CPT

## 2022-10-20 RX ORDER — SODIUM CHLORIDE 9 MG/ML
INJECTION, SOLUTION INTRAVENOUS PRN
Status: DISCONTINUED | OUTPATIENT
Start: 2022-10-20 | End: 2022-10-21 | Stop reason: HOSPADM

## 2022-10-20 RX ORDER — GUAIFENESIN 600 MG/1
600 TABLET, EXTENDED RELEASE ORAL 2 TIMES DAILY
Status: DISCONTINUED | OUTPATIENT
Start: 2022-10-20 | End: 2022-10-21 | Stop reason: HOSPADM

## 2022-10-20 RX ORDER — ASPIRIN 81 MG/1
81 TABLET, CHEWABLE ORAL DAILY
Status: DISCONTINUED | OUTPATIENT
Start: 2022-10-20 | End: 2022-10-21 | Stop reason: HOSPADM

## 2022-10-20 RX ORDER — ATROPINE SULFATE 10 MG/ML
1 SOLUTION/ DROPS OPHTHALMIC DAILY
Status: DISCONTINUED | OUTPATIENT
Start: 2022-10-20 | End: 2022-10-21 | Stop reason: HOSPADM

## 2022-10-20 RX ORDER — IPRATROPIUM BROMIDE AND ALBUTEROL SULFATE 2.5; .5 MG/3ML; MG/3ML
1 SOLUTION RESPIRATORY (INHALATION) EVERY 4 HOURS PRN
Status: DISCONTINUED | OUTPATIENT
Start: 2022-10-20 | End: 2022-10-21 | Stop reason: HOSPADM

## 2022-10-20 RX ORDER — POLYETHYLENE GLYCOL 3350 17 G/17G
17 POWDER, FOR SOLUTION ORAL DAILY PRN
Status: DISCONTINUED | OUTPATIENT
Start: 2022-10-20 | End: 2022-10-21 | Stop reason: HOSPADM

## 2022-10-20 RX ORDER — ACETAMINOPHEN 650 MG/1
650 SUPPOSITORY RECTAL EVERY 6 HOURS PRN
Status: DISCONTINUED | OUTPATIENT
Start: 2022-10-20 | End: 2022-10-21 | Stop reason: HOSPADM

## 2022-10-20 RX ORDER — ATROPINE SULFATE 10 MG/ML
1 SOLUTION/ DROPS OPHTHALMIC DAILY
COMMUNITY

## 2022-10-20 RX ORDER — MULTIVIT-MIN/IRON/FOLIC ACID/K 18-600-40
5000 CAPSULE ORAL
COMMUNITY

## 2022-10-20 RX ORDER — ACETAMINOPHEN 325 MG/1
650 TABLET ORAL EVERY 6 HOURS PRN
Status: DISCONTINUED | OUTPATIENT
Start: 2022-10-20 | End: 2022-10-21 | Stop reason: HOSPADM

## 2022-10-20 RX ORDER — SODIUM CHLORIDE 0.9 % (FLUSH) 0.9 %
5-40 SYRINGE (ML) INJECTION EVERY 12 HOURS SCHEDULED
Status: DISCONTINUED | OUTPATIENT
Start: 2022-10-20 | End: 2022-10-21 | Stop reason: HOSPADM

## 2022-10-20 RX ORDER — LACTULOSE 10 G/15ML
15 SOLUTION ORAL DAILY
COMMUNITY

## 2022-10-20 RX ORDER — ATORVASTATIN CALCIUM 40 MG/1
40 TABLET, FILM COATED ORAL DAILY
COMMUNITY

## 2022-10-20 RX ORDER — FLUTICASONE PROPIONATE 50 MCG
1 SPRAY, SUSPENSION (ML) NASAL 2 TIMES DAILY
COMMUNITY

## 2022-10-20 RX ORDER — ONDANSETRON 4 MG/1
4 TABLET, ORALLY DISINTEGRATING ORAL EVERY 8 HOURS PRN
Status: DISCONTINUED | OUTPATIENT
Start: 2022-10-20 | End: 2022-10-21 | Stop reason: HOSPADM

## 2022-10-20 RX ORDER — ENOXAPARIN SODIUM 100 MG/ML
40 INJECTION SUBCUTANEOUS DAILY
Status: DISCONTINUED | OUTPATIENT
Start: 2022-10-20 | End: 2022-10-21 | Stop reason: HOSPADM

## 2022-10-20 RX ORDER — ATORVASTATIN CALCIUM 40 MG/1
40 TABLET, FILM COATED ORAL DAILY
Status: DISCONTINUED | OUTPATIENT
Start: 2022-10-20 | End: 2022-10-21 | Stop reason: HOSPADM

## 2022-10-20 RX ORDER — LACTULOSE 10 G/15ML
20 SOLUTION ORAL
Status: DISCONTINUED | OUTPATIENT
Start: 2022-10-20 | End: 2022-10-21 | Stop reason: HOSPADM

## 2022-10-20 RX ORDER — ONDANSETRON 2 MG/ML
4 INJECTION INTRAMUSCULAR; INTRAVENOUS EVERY 6 HOURS PRN
Status: DISCONTINUED | OUTPATIENT
Start: 2022-10-20 | End: 2022-10-21 | Stop reason: HOSPADM

## 2022-10-20 RX ORDER — QUETIAPINE FUMARATE 50 MG/1
50 TABLET, FILM COATED ORAL 3 TIMES DAILY
Status: DISCONTINUED | OUTPATIENT
Start: 2022-10-20 | End: 2022-10-21 | Stop reason: HOSPADM

## 2022-10-20 RX ORDER — DIVALPROEX SODIUM 125 MG/1
500 CAPSULE, COATED PELLETS ORAL 2 TIMES DAILY
Status: DISCONTINUED | OUTPATIENT
Start: 2022-10-20 | End: 2022-10-21 | Stop reason: HOSPADM

## 2022-10-20 RX ORDER — CARBOXYMETHYLCELLULOSE SODIUM 10 MG/ML
1 GEL OPHTHALMIC 4 TIMES DAILY
Status: DISCONTINUED | OUTPATIENT
Start: 2022-10-20 | End: 2022-10-21 | Stop reason: HOSPADM

## 2022-10-20 RX ORDER — SODIUM CHLORIDE, SODIUM LACTATE, POTASSIUM CHLORIDE, CALCIUM CHLORIDE 600; 310; 30; 20 MG/100ML; MG/100ML; MG/100ML; MG/100ML
INJECTION, SOLUTION INTRAVENOUS CONTINUOUS
Status: DISCONTINUED | OUTPATIENT
Start: 2022-10-20 | End: 2022-10-21 | Stop reason: HOSPADM

## 2022-10-20 RX ORDER — CETIRIZINE HYDROCHLORIDE 10 MG/1
10 TABLET ORAL DAILY
COMMUNITY

## 2022-10-20 RX ORDER — BUSPIRONE HYDROCHLORIDE 10 MG/1
30 TABLET ORAL 2 TIMES DAILY
Status: DISCONTINUED | OUTPATIENT
Start: 2022-10-20 | End: 2022-10-21 | Stop reason: HOSPADM

## 2022-10-20 RX ORDER — SODIUM CHLORIDE 0.9 % (FLUSH) 0.9 %
5-40 SYRINGE (ML) INJECTION PRN
Status: DISCONTINUED | OUTPATIENT
Start: 2022-10-20 | End: 2022-10-21 | Stop reason: HOSPADM

## 2022-10-20 RX ADMIN — LACTULOSE 20 G: 20 SOLUTION ORAL at 01:26

## 2022-10-20 RX ADMIN — CARBOXYMETHYLCELLULOSE SODIUM 1 DROP: 10 GEL OPHTHALMIC at 23:28

## 2022-10-20 RX ADMIN — DIVALPROEX SODIUM 500 MG: 125 CAPSULE, COATED PELLETS ORAL at 23:01

## 2022-10-20 RX ADMIN — LACTULOSE 20 G: 20 SOLUTION ORAL at 09:47

## 2022-10-20 RX ADMIN — ASPIRIN 81 MG 81 MG: 81 TABLET ORAL at 09:47

## 2022-10-20 RX ADMIN — PIPERACILLIN AND TAZOBACTAM 3375 MG: 3; .375 INJECTION, POWDER, LYOPHILIZED, FOR SOLUTION INTRAVENOUS at 15:20

## 2022-10-20 RX ADMIN — QUETIAPINE FUMARATE 50 MG: 50 TABLET ORAL at 09:47

## 2022-10-20 RX ADMIN — DIVALPROEX SODIUM 500 MG: 125 CAPSULE, COATED PELLETS ORAL at 09:47

## 2022-10-20 RX ADMIN — LACTULOSE 20 G: 20 SOLUTION ORAL at 23:02

## 2022-10-20 RX ADMIN — SODIUM CHLORIDE, PRESERVATIVE FREE 10 ML: 5 INJECTION INTRAVENOUS at 01:27

## 2022-10-20 RX ADMIN — DIVALPROEX SODIUM 500 MG: 125 CAPSULE, COATED PELLETS ORAL at 01:27

## 2022-10-20 RX ADMIN — GUAIFENESIN 600 MG: 600 TABLET ORAL at 01:26

## 2022-10-20 RX ADMIN — BUSPIRONE HYDROCHLORIDE 30 MG: 10 TABLET ORAL at 23:01

## 2022-10-20 RX ADMIN — ENOXAPARIN SODIUM 40 MG: 100 INJECTION SUBCUTANEOUS at 09:46

## 2022-10-20 RX ADMIN — BUSPIRONE HYDROCHLORIDE 30 MG: 10 TABLET ORAL at 01:26

## 2022-10-20 RX ADMIN — QUETIAPINE FUMARATE 50 MG: 50 TABLET ORAL at 23:01

## 2022-10-20 RX ADMIN — PIPERACILLIN AND TAZOBACTAM 3375 MG: 3; .375 INJECTION, POWDER, LYOPHILIZED, FOR SOLUTION INTRAVENOUS at 04:41

## 2022-10-20 RX ADMIN — BUSPIRONE HYDROCHLORIDE 30 MG: 10 TABLET ORAL at 09:47

## 2022-10-20 RX ADMIN — PIPERACILLIN AND TAZOBACTAM 3375 MG: 3; .375 INJECTION, POWDER, LYOPHILIZED, FOR SOLUTION INTRAVENOUS at 23:09

## 2022-10-20 RX ADMIN — LACTULOSE 20 G: 20 SOLUTION ORAL at 04:40

## 2022-10-20 RX ADMIN — ATORVASTATIN CALCIUM 40 MG: 40 TABLET, FILM COATED ORAL at 09:47

## 2022-10-20 RX ADMIN — SODIUM CHLORIDE, POTASSIUM CHLORIDE, SODIUM LACTATE AND CALCIUM CHLORIDE: 600; 310; 30; 20 INJECTION, SOLUTION INTRAVENOUS at 01:26

## 2022-10-20 RX ADMIN — GUAIFENESIN 600 MG: 600 TABLET ORAL at 23:01

## 2022-10-20 ASSESSMENT — PAIN SCALES - WONG BAKER
WONGBAKER_NUMERICALRESPONSE: 0
WONGBAKER_NUMERICALRESPONSE: 0

## 2022-10-20 NOTE — PROGRESS NOTES
Pt assessment and vitals complete and stable. Patient resting in bed, nonverbal, blind and unable to follow directions. Pt took medications crushed in pudding. Tolerated well.  Denies needs

## 2022-10-20 NOTE — CARE COORDINATION
This LSW visited patient at bedside this afternoon. Patient resting comfortably. Discharge plan : Per Terrjoseph Mosher , patients court appointed legal guardian, patient will return to [facility]group home when medically stable. VAMSIW / VILMA to follow.   Electronically signed by EUSEBIA Curtis, SYMONE on 10/20/22 at 2:42 PM EDT

## 2022-10-20 NOTE — H&P
Hospital Medicine  History and Physical    Patient:  Johnnie Kiran  MRN: [id number]    CHIEF COMPLAINT:    Chief Complaint   Patient presents with    Respiratory Distress     Per ems nursing home called for resp distress, patient dc'd from [facility] for same complaint, pulse ox 80% with exertion       History Obtained From:  patient, electronic medical record  Primary Care Physician: Natty De Leon MD    HISTORY OF PRESENT ILLNESS:   The patient is a 80 y.o. female who presents with hypoxia from local skilled nursing facility. Patient is an 68-year-old female past medical history of developmental delay/MR she has a history of blindness multiple previous bowel obstructions seizure disorder was recently discharged after being treated for aspiration pneumonia. She presents to the ED hypoxic from local skilled facility. Attempts were made to get her on some supplemental O2 from the ER discharge back to the facility but these were unsuccessful therefore the patient was admitted to the medical service for oxygen services. On arrival basic metabolic panel is relatively benign, patient exhibits no leukocytosis or left shift. UA is benign. ABG shows a somewhat decreased PO2 however saturation was still 90%. CTA chest did not show any infiltrates. CT abdomen does show a large amount of retained stool with proctitis.   History is not able to obtain from the patient as she is nonverbal.    Past Medical History:      Diagnosis Date    Ceruminosis     Chronic rhinitis     Dyslipidemia     History of encephalitis     Large bowel obstruction (HCC)     Legally blind     Mood disorder ([address]     MR (mental retardation)     Osteoporosis     Periodontal disease     Pneumonia 12/9/2018    SBO (small bowel obstruction) ([address] 3/22/2019    Seizure disorder ([address]     Vitamin D deficiency        Past Surgical History:      Procedure Laterality Date    COLONOSCOPY N/A 3/22/2019    COLONOSCOPY performed by Luzma Shaikh MD at [facility] OR       Medications Prior to Admission:    Prior to Admission medications    Medication Sig Start Date End Date Taking? Authorizing Provider   atorvastatin (LIPITOR) 40 MG tablet Take 40 mg by mouth daily   Yes Historical Provider, MD   atropine 1 % ophthalmic solution Place 1 drop into the left eye daily   Yes Historical Provider, MD   cetirizine (ZYRTEC) 10 MG tablet Take 10 mg by mouth daily   Yes Historical Provider, MD   fluticasone (FLONASE) 50 MCG/ACT nasal spray 1 spray by Each Nostril route in the morning and at bedtime   Yes Historical Provider, MD   lactulose (CHRONULAC) 10 GM/15ML solution Take 15 mLs by mouth daily   Yes Historical Provider, MD   carboxymethylcellulose 1 % ophthalmic solution Place 1 drop into both eyes in the morning, at noon, in the evening, and at bedtime   Yes Historical Provider, MD   Cholecalciferol (VITAMIN D) 50 MCG (2000 UT) CAPS capsule Take 5,000 Units by mouth Give one capsule by mouth every week on Modays   Yes Historical Provider, MD   mupirocin (BACTROBAN) 2 % ointment Apply topically in the morning and at bedtime Apply intranasally BID   Yes Historical Provider, MD   clonazePAM (KLONOPIN) 1 MG tablet Take 1 mg by mouth in the morning, at noon, and at bedtime.  Indications: Agitation, Feeling Anxious, Trouble Sleeping 10/19/22   Juanito Murguia MD   bisacodyl (DULCOLAX) 10 MG suppository Place 1 suppository rectally daily as needed for Constipation 10/19/22 11/18/22  Juanito Murguia MD   senna (SENOKOT) 8.6 MG tablet Take 2 tablets by mouth daily 10/20/22 11/19/22  Juanito Murguia MD   busPIRone (BUSPAR) 7.5 MG tablet Take 1 tablet by mouth 2 times daily  Patient taking differently: Take 30 mg by mouth 2 times daily 10/19/22   Juanito Murguia MD   albuterol (PROVENTIL) (2.5 MG/3ML) 0.083% nebulizer solution Take 2.5 mg by nebulization every 6 hours as needed for Wheezing    Historical Provider, MD   chlorhexidine (PERIDEX) 0.12 % solution Take 15 mLs by mouth 2 times daily Apply to gums. Historical Provider, MD   Protein POWD Take by mouth Give 1 teaspoon (5ml) in water/juice BID    Historical Provider, MD   QUEtiapine (SEROQUEL) 100 MG tablet Take 50 mg by mouth 3 times daily    Historical Provider, MD   aspirin 81 MG tablet Take 81 mg by mouth daily    Historical Provider, MD   divalproex (DEPAKOTE) 250 MG DR tablet Take 500 mg by mouth 2 times daily     Historical Provider, MD   psyllium (REGULOID) 58.6 % powder Take 1 packet by mouth 3 times daily Take by mouth 3 times daily. Historical Provider, MD   Probiotic Product (PROBIOTIC PO) Take 1 capsule by mouth 2 times daily as needed     Historical Provider, MD   Sodium Phosphates (FLEET) Place 1 enema rectally daily as needed. Indications: Constipation    Historical Provider, MD   OXYGEN Inhale 2 L/min into the lungs as needed. ADMINISTER VIA N/C.  TITRATE PRN TO MAINTAIN PULSE OX ABOVE 92%. Indications: Hypoxia    Historical Provider, MD   acetaminophen (TYLENOL) 325 MG tablet Take 650 mg by mouth every 4 hours as needed. May give rectal if unable to tolerate PO administration. Indications: Fever, Pain    Historical Provider, MD   Magnesium Hydroxide (MILK OF MAGNESIA PO) Take 30 mLs by mouth daily as needed. Indications: Constipation    Historical Provider, MD   docusate sodium (COLACE) 100 MG capsule Take 200 mg by mouth daily Indications: Chronic Constipation Give 2 capsules daily. Hold for loose stools. Historical Provider, MD   Multiple Vitamin (MULTIVITAMIN PO) Take 1 tablet by mouth daily. Indications: multivitamin    Historical Provider, MD   calcium-vitamin D (OSCAL-500) 500-200 MG-UNIT per tablet Take 1 tablet by mouth 2 times daily. Administer with food.   Indications: supplement    Historical Provider, MD   vitamin D (ERGOCALCIFEROL) 57800 UNITS CAPS capsule Take 50,000 Units by mouth once a week Indications: Vitamin D Deficiency On Mondays    Historical Provider, MD       Allergies:  Patient has no known allergies. Social History:   TOBACCO:   reports that she has never smoked. She has never used smokeless tobacco.  ETOH:   reports no history of alcohol use. OCCUPATION:  no    Family History:   History reviewed. No pertinent family history. REVIEW OF SYSTEMS:  To obtain ROS    Physical Exam:    Vitals: BP (!) 153/106   Pulse 87   Temp 97.7 °F (36.5 °C) (Axillary)   Resp 18   Ht 5' 5\" (1.651 m)   Wt 168 lb (76.2 kg)   SpO2 97%   BMI 27.96 kg/m²   Constitutional: Awake confused     Examination is significantly limited as patient is resisting examination becomes agitated during examination     Skin: Skin color, texture, turgor normal. No rashes or lesions  Eyes:Eye: Normal external eye, conjunctiva, ANDREW. ENT: Head: Normocephalic, no lesions, without obvious abnormality. Neck: no adenopathy, no carotid bruit, no JVD, supple, symmetrical, trachea midline and thyroid not enlarged, symmetric, no tenderness/mass/nodules  Respiratory: clear to auscultation bilaterally  Cardiovascular: regular rate and rhythm, S1, S2 normal, no murmur, click, rub or gallop  Gastrointestinal: Distended, tympanic, tender  Genitourinary: Deferred  Musculoskeletal:extremities normal, atraumatic, no cyanosis or edema  Neurologic: Mental status AAOx0 No facial asymmetry or droop. Normal muscle strength b/l.    Psychiatric: Able to accurately assess psychiatric status  Hematologic: No obvious bruising or bleeding    Recent Labs     10/18/22  0907 10/19/22  0609 10/19/22  1625 10/19/22  1643   WBC 8.5 5.2  --  6.1   HGB 12.8 11.1* 11.9* 11.6*   PLT 91* 91*  --  111*     Recent Labs     10/17/22  0635 10/18/22  0907 10/19/22  1625 10/19/22  1642    138  --  141   K 3.8 4.0  --  3.9    100  --  102   CO2 27 22  --  33*   BUN 9 15  --  16   CREATININE 0.49* 0.54 0.4* 0.46*   GLUCOSE 120* 101*  --  84   AST  --   --   --  23   ALT  --   --   --  16   BILITOT  --   --   --  0.5   ALKPHOS  --   --   --  65     Troponin T: Recent Labs     10/19/22  1642   TROPONINI <0.010       ABGs:   Lab Results   Component Value Date/Time    PHART 7.431 10/19/2022 04:25 PM    PO2ART 57 10/19/2022 04:25 PM    UAV5GSE 42 10/19/2022 04:25 PM     INR: No results for input(s): INR in the last 72 hours. URINALYSIS:  Recent Labs     10/18/22  1505   COLORU Yellow   PHUR 7.0   CLARITYU Clear   SPECGRAV 1.010   LEUKOCYTESUR Negative   UROBILINOGEN 0.2   BILIRUBINUR Negative   BLOODU Negative   GLUCOSEU Negative     -----------------------------------------------------------------   CTA Chest W WO  (PE study)    Result Date: 10/19/2022  EXAMINATION: CTA OF THE CHEST WITH AND WITHOUT CONTRAST 10/19/2022 6:39 pm TECHNIQUE: CTA of the chest was performed before and after the administration of intravenous contrast.  Multiplanar reformatted images are provided for review. MIP images are provided for review. Automated exposure control, iterative reconstruction, and/or weight based adjustment of the mA/kV was utilized to reduce the radiation dose to as low as reasonably achievable. COMPARISON: CT abdomen pelvis CT abdomen pelvis 02/03/2021 HISTORY: ORDERING SYSTEM PROVIDED HISTORY: SOB, hypoxia TECHNOLOGIST PROVIDED HISTORY: Reason for exam:->SOB, hypoxia Decision Support Exception - unselect if not a suspected or confirmed emergency medical condition->Emergency Medical Condition (MA) What reading provider will be dictating this exam?->CRC FINDINGS: Aorta: No central or large pulmonary embolism. The distal pulmonary arteries are not well evaluated due to breathing motion artifact. Mediastinum: No evidence of mediastinal lymphadenopathy. The heart and pericardium demonstrate no acute abnormality. Atherosclerotic disease. Unremarkable thyroid. There is suggestion of esophageal thickening throughout its course. Lungs/Pleura: No pleural effusion or pneumothorax. Expiratory phase appearance of the trachea.   Bands of linear atelectasis versus scarring in the lungs. Low lung volumes. Grossly stable roughly 6 mm x 6 mm right middle lobe solid pulmonary nodule (series 3, image 119). Upper Abdomen: Atherosclerotic disease. Soft Tissues/Bones: Degenerative changes of the spine and shoulders. No central or large pulmonary embolism. The distal pulmonary arteries are not well evaluated due to breathing motion artifact. No thoracic aortic aneurysm or dissection. No pneumonia. Low lung volumes. Stable 6 mm solid pulmonary nodule in the right lower lobe; see recommendation below. Atherosclerotic disease. RECOMMENDATIONS: Recommend a low-dose chest CT in 4 months to complete 24 months of follow-up. Note: This recommendation does not apply to patients younger than 35 years, immunocompromised patients, and patients with cancer. F/u in patients with significant comorbidities as clinically warranted. For lung cancer screening, adhere to Lung-RADS guidelines. Reference: Radiology. 2017 Jul;     Assessment and Plan   Hypoxia following aspiration pneumonia: Consult to respiratory therapy Mucinex Acapella incentive spirometry and DuoNeb treatments as needed O2 consult  for oxygen prescription. Check procalcitonin. Proctitis with constipation: Aggressive stool regimen broad-spectrum coverage started with Zosyn for aspiration we will continue for proctitis and likely transition to Augmentin on discharge.   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

## 2022-10-20 NOTE — PROGRESS NOTES
Internal Medicine   Hospitalist   Progress Note    10/20/2022   1:16 PM    Name:  Tara Wakefield  MRN:    [id number]     IP Day: 0     Admit Date: 10/19/2022  3:49 PM  PCP: Anastacio Patterson MD    Code Status:  DNR-CC    Assessment and Plan: Active Problems/ diagnosis:     Aspiration PNA   Proctitis   Developmental delay   Seizure d/o    Plan  Resume abx, on Zosyn   Resume IVF for today  Home meds ordered as appropriate   SLP eval  PT/OT  Seizure meds: depakote   SW/CM consult   DVT PPx     7 pm- 7 am, please contact on call Hospitalist for any needs     Subjective:      no new events.      Physical Examination:      Vitals:  BP (!) 153/106   Pulse 87   Temp 97.7 °F (36.5 °C) (Axillary)   Resp 18   Ht 5' 5\" (1.651 m)   Wt 168 lb (76.2 kg)   SpO2 97%   BMI 27.96 kg/m²   Temp (24hrs), Av.2 °F (36.8 °C), Min:97.7 °F (36.5 °C), Max:98.7 °F (37.1 °C)      General appearance: alert, no distress  Mental Status: deferred   Lungs: clear to auscultation bilaterally, normal effort  Heart: regular rate and rhythm, no murmur  Abdomen: soft, nontender, nondistended, bowel sounds present, no masses  Extremities: no edema, redness, tenderness in the calves  Skin: no gross lesions, rashes  Neuro: AOx0    Data:     Labs:  Recent Labs     10/19/22  1643 10/20/22  0451   WBC 6.1 5.2   HGB 11.6* 11.6*   * 119*     Recent Labs     10/19/22  1642 10/20/22  0451    136   K 3.9 5.3*    101   CO2 33* 29   BUN 16 8   CREATININE 0.46* 0.41*   GLUCOSE 84 142*     Recent Labs     10/19/22  1642 10/20/22  0451   AST 23 30   ALT 16 <5   BILITOT 0.5 0.6   ALKPHOS 65 58       Current Facility-Administered Medications   Medication Dose Route Frequency Provider Last Rate Last Admin    lactulose (CHRONULAC) 10 GM/15ML solution 20 g  20 g Oral 6 times per day Stan Yepez, DO   20 g at 10/20/22 0947    piperacillin-tazobactam (ZOSYN) 3,375 mg in dextrose 5 % 50 mL IVPB extended infusion (mini-bag)  3,375 mg IntraVENous Q8H [facility] Sedar, DO   Stopped at 10/20/22 7949    lactated ringers infusion   IntraVENous Continuous [facility] Sedar, DO 75 mL/hr at 10/20/22 7948 Restarted at 10/20/22 7150    sodium chloride flush 0.9 % injection 5-40 mL  5-40 mL IntraVENous 2 times per day Alex Markos Sedar, DO   10 mL at 10/20/22 0127    sodium chloride flush 0.9 % injection 5-40 mL  5-40 mL IntraVENous PRN [facility] Sedar, DO        0.9 % sodium chloride infusion   IntraVENous PRN [facility] Sedar, DO        ondansetron (ZOFRAN-ODT) disintegrating tablet 4 mg  4 mg Oral Q8H PRN [facility] Sedar, DO        Or    ondansetron (ZOFRAN) injection 4 mg  4 mg IntraVENous Q6H PRN [facility] Sedar, DO        polyethylene glycol (GLYCOLAX) packet 17 g  17 g Oral Daily PRN [facility] Sedar, DO        acetaminophen (TYLENOL) tablet 650 mg  650 mg Oral Q6H PRN [facility] Sedar, DO        Or    acetaminophen (TYLENOL) suppository 650 mg  650 mg Rectal Q6H PRN [facility] Sedar, DO        guaiFENesin [facility]) extended release tablet 600 mg  600 mg Oral BID Alex Markos Sedar, DO   600 mg at 10/20/22 0126    ipratropium-albuterol (DUONEB) nebulizer solution 1 ampule  1 ampule Inhalation Q4H PRN [facility] Sedar, DO        aspirin chewable tablet 81 mg  81 mg Oral Daily Jan GRANADOS Sedar, DO   81 mg at 10/20/22 0947    atorvastatin (LIPITOR) tablet 40 mg  40 mg Oral Daily Jan GRANADOS Sedar, DO   40 mg at 10/20/22 0947    busPIRone (BUSPAR) tablet 30 mg  30 mg Oral BID Alex Burrows Sedar, DO   30 mg at 10/20/22 0947    carboxymethylcellulose PF (THERATEARS) 1 % ophthalmic gel 1 drop  1 drop Both Eyes 4x daily Yonatan GRANADOS Sedar, DO        divalproex (DEPAKOTE SPRINKLE) capsule 500 mg  500 mg Oral BID Alex Burrows Sedar, DO   500 mg at 10/20/22 0947    QUEtiapine (SEROQUEL) tablet 50 mg  50 mg Oral TID Alex Yepez, DO   50 mg at 10/20/22 0947    atropine 1 % ophthalmic solution 1 drop  1 drop Left Eye Daily Yonatan Yepez DO        enoxaparin (LOVENOX) injection 40 mg  40 mg SubCUTAneous Daily Alex Yepez, DO   40 mg at 10/20/22 3195       Additional work up or/and treatment plan may be added today or then after based on clinical progression. I am managing a portion of pt care. Some medical issues are handled by other specialists. Additional work up and treatment should be done in out pt setting by pt PCP and other out pt providers. In addition to examining and evaluating pt, I spent additional time explaining care, normaland abnormal findings, and treatment plan. All of pt questions were answered. Counseling, diet and education were provided. Case will be discussed with nursing staff when appropriate. Family will be updated if and when appropriate.        Electronically signed by Magen Jacinto DO on 10/20/2022 at 1:16 PM

## 2022-10-20 NOTE — PROGRESS NOTES
[facility] Respiratory Therapy Evaluation   Current Order:  Duoneb Q4 PRN      Home Regimen: PRN      Ordering Physician: Lucho  Re-evaluation Date:  ---     Diagnosis: Hypoxia      Patient Status: Stable / Unstable + Physician notified    The following MDI Criteria must be met in order to convert aerosol to MDI with spacer. If unable to meet, MDI will be converted to aerosol:  []  Patient able to demonstrate the ability to use MDI effectively  []  Patient alert and cooperative  []  Patient able to take deep breath with 5-10 second hold  []  Medication(s) available in this delivery method   []  Peak flow greater than or equal to 200 ml/min            Current Order Substituted To  (same drug, same frequency)   Aerosol to MDI [] Albuterol Sulfate 0.083% unit dose by aerosol Albuterol Sulfate MDI 2 puffs by inhalation with spacer    [] Levalbuterol 1.25 mg unit dose by aerosol Levalbuterol MDI 2 puffs by inhalation with spacer    [] Levalbuterol 0.63 mg unit dose by aerosol Levalbuterol MDI 2 puffs by inhalation with spacer    [] Ipratropium Bromide 0.02% unit dose by aerosol Ipratropium Bromide MDI 2 puffs by inhalation with spacer    [] Duoneb (Ipratropium + Albuterol) unit dose by aerosol Ipratropium MDI + Albuterol MDI 2 puffs by inhalation w/spacer   MDI to Aerosol [] Albuterol Sulfate MDI Albuterol Sulfate 0.083% unit dose by aerosol    [] Levalbuterol MDI 2 puffs by inhalation Levalbuterol 1.25 mg unit dose by aerosol    [] Ipratropium Bromide MDI by inhalation Ipratropium Bromide 0.02% unit dose by aerosol    [] Combivent (Ipratropium + Albuterol) MDI by inhalation Duoneb (Ipratropium + Albuterol) unit dose by aerosol       Treatment Assessment [Frequency/Schedule]:  Change frequency to: _________No Changes_________________________________________per Protocol, P&T, MEC      Points 0 1 2 3 4   Pulmonary Status  Non-Smoker  [x]   Smoking history   < 20 pack years  []   Smoking history  ?  20 pack years  [] Pulmonary Disorder  (acute or chronic)  []   Severe or Chronic w/ Exacerbation  []     Surgical Status No [x]   Surgeries     General []   Surgery Lower []   Abdominal Thoracic or []   Upper Abdominal Thoracic with  PulmonaryDisorder  []     Chest X-ray Clear/Not  Ordered     [x]  Chronic Changes  Results Pending  []  Infiltrates, atelectasis, pleural effusion, or edema  []  Infiltrates in more than one lobe []  Infiltrate + Atelectasis, &/or pleural effusion  []    Respiratory Pattern Regular,  RR = 12-20 [x]  Increased,  RR = 21-25 []  ACE, irregular,  or RR = 26-30 []  Decreased FEV1  or RR = 31-35 []  Severe SOB, use  of accessory muscles, or RR ? 35  []    Mental Status Alert, oriented,  Cooperative [x]  Confused but Follows commands []  Lethargic or unable to follow commands []  Obtunded  []  Comatose  []    Breath Sounds Clear to  auscultation  []  Decreased unilaterally or  in bases only [x]  Decreased  bilaterally  []  Crackles or intermittent wheezes []  Wheezes []    Cough Strong, Spontan., & nonproductive [x]  Strong,  spontaneous, &  productive []  Weak,  Nonproductive []  Weak, productive or  with wheezes []  No spontaneous  cough or may require suctioning []    Level of Activity Ambulatory [x]  Ambulatory w/ Assist  []  Non-ambulatory []  Paraplegic []  Quadriplegic []    Total    Score:____1___     Triage Score:____5____      Tri       Triage:     1. (>20) Freq: Q3    2. (16-20) Freq: Q4   3. (11-15) Freq: QID & Albuterol Q2 PRN    4. (6-10) Freq: TID & Albuterol Q2 PRN    5. (0-5) Freq Q4prn

## 2022-10-20 NOTE — FLOWSHEET NOTE
Pt's admission completed using paperwork from facility. Medications rec'd, Dr. Keisha Huston made aware. RN and Guardian made aware of pt's admission, HIPAA code provided. Fall protocol in place, bed wheels locked, bed in low position.

## 2022-10-20 NOTE — PROGRESS NOTES
[facility]   Facility/Department: [facility] SURG UNIT  Speech Language Pathology  Clinical Bedside Swallow Evaluation    NAME:Therese Neri  : 1940 (82 y.o.)   [x]   confirmed    MRN: [id number]  ROOM: [place] [address]  ADMISSION DATE: 10/19/2022  PATIENT DIAGNOSIS(ES): Hypoxia [R09.02]  Pneumonia due to infectious organism, unspecified laterality, unspecified part of lung [J18.9]  Chief Complaint   Patient presents with    Respiratory Distress     Per ems nursing home called for resp distress, patient dc'd from [facility] for same complaint, pulse ox 80% with exertion     Patient Active Problem List    Diagnosis Date Noted    Hypoxia 10/19/2022    Aspiration of food, sequela 10/17/2022    Sepsis ([address] 10/15/2022    Other thrombophilia ([address] 01/15/2021    Pneumonia due to COVID-19 virus 2020    Aphakia, left eye 10/08/2018    Hyphema, left eye 10/08/2018    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    Leucopenia 2018    Drug-induced thrombocytopenia 2017    Mental retardation 2013    Dyslipidemia     Periodontal disease     Full code status 2013    Dietary restriction 2013    Vitamin D deficiency     MR (mental retardation)     Seizure disorder ([address]     Legally blind     Chronic rhinitis     Mood disorder ([address]      Past Medical History:   Diagnosis Date    Ceruminosis     Chronic rhinitis     Dyslipidemia     History of encephalitis     Large bowel obstruction ([address]     Legally blind     Mood disorder ([address]     MR (mental retardation)     Osteoporosis     Periodontal disease     Pneumonia 2018    SBO (small bowel obstruction) ([address] 3/22/2019    Seizure disorder ([address]     Vitamin D deficiency      Past Surgical History:   Procedure Laterality Date    COLONOSCOPY N/A 3/22/2019    COLONOSCOPY performed by Kait Byrd MD at [facility]     No Known Allergies    DATE ONSET: 10//    Date of Evaluation: 10/20/2022   Evaluating Therapist: Darek Adame SLP    Dysphagia Diagnosis  Dysphagia Diagnosis: Moderate to severe oral stage dysphagia;Mild pharyngeal stage dysphagia  Dysphagia Impression : Clinical indicators of oral and pharyngeal dysphagia identified at bedside, likely acute-on-chronic related to weakness associated with hypoxia and intellectual disability. Prognosis for improvement is guarded given patient's current clinical presentation. A modified PO diet is recommended at this time. An instrumental swallowing evaluation is not recommended at this time as one was completed 2 days prior. Recommended Diet  Recommendations: Dysphagia treatment;Assistance with meals  Referral To: Dietician  Diet Solids Recommendation: Pureed  Liquid Consistency Recommendation: Mildly Thick (Nectar)  Recommended Form of Meds: Crushed in puree as able  Compensatory Swallowing Strategies : No straws;Eat/Feed slowly;Upright as possible for all oral intake;Small bites/sips;Assist feed      Reason for Referral  Tara Wakefield was referred for a bedside swallow evaluation to assess the efficiency of her swallow function, identify signs and symptoms of aspiration, identify risk factors, and make recommendations regarding safe dietary consistencies, effective compensatory strategies, and safe eating environment. General  Chart Reviewed: Yes  Subjective  Subjective: PCA feeding patient breakfast upon arrival. MBS completed 2 days prior, patient d/c from [facility] yesterday to group [place], re-admitted now d/t hypoxia. Behavior/Cognition: Confused; Doesn't follow directions; Alert  O2 Device: Nasal cannula  Liters of Oxygen: 2 L  Communication Observation: Non-verbal  Follows Directions: None  Dentition: Edentulous  Patient Positioning: Upright in bed  Baseline Vocal Quality: Normal  Volitional Cough:  (unable to elicit)  Prior Dysphagia History: MBS completed 2 days prior noted moderate-severe oral dysphagia and mild pharyngeal dysphagia with deit recommendation of pureed solids/mildly thick liquids  Consistencies Administered: Pureed;Mildly Thick - cup    Vision and Hearing  Vision  Vision: Impaired  Vision Exceptions: Legally blind  Hearing  Hearing:  (VIDYA)    Current Diet level  Current Diet : Pureed  Current Liquid Diet : Mildly Thick    Oral Motor  Labial: Impaired coordination;Decreased seal  Dentition: Edentulous  Oral Hygiene: Moist;Clean  Lingual: Decreased strength;Decreased rate  Velum: Unable to visualize  Mandible: No impairment  Gag: Other (comment)    Oral/Pharyngeal Phase  Oral Phase - Comment: Moderate-severe oral dysphagia characterized by generalized oral weakness with suspected delayed A-P transit, bolus cohesion, and bolus control with premature loss to the pharynx. No significant oral residuals post swallow. Pharyngeal Phase: Mild pharyngeal dysphagia identified on MBS 2 days prior characterized by reduced tongue base retraction and delayed initiation of the pharyngeal swallow. Aspiration is not suspected. PO Trials  Neuromuscular Estim Used: No  Assessment Method(s): Observation  Patient Position: Upright in bed  Vocal Quality: No Impairment  Consistency Presented: Pureed;Mildly Thick  How Presented: SLP-fed/Presented;Cup/sip;Spoon  How much presented:  (2/3 plate of pureed solids, 2 cups of mildly thick liquids)  Bolus Acceptance: No impairment  Bolus Formation/Control: Impaired  Type of Impairment: Delayed; Anterior;Posterior; Suspected premature spilling  Propulsion: Tongue pumping;Delayed (# of seconds)  Oral Residue: None  Initiation of Swallow: Delayed (# of seconds) (suspecte delayed initiation)  Laryngeal Elevation:  (present)  Aspiration Signs/Symptoms: None  Pharyngeal Phase Characteristics: No impairment, issues, or problems  Effective Modifications: Small sips and bites  Comments: Patient highly impulsive with PO intake.     Dysphagia Diagnosis  Dysphagia Diagnosis: Moderate to severe oral stage dysphagia;Mild pharyngeal stage dysphagia  Dysphagia Impression : Clinical indicators of oral and pharyngeal dysphagia identified at bedside, likely acute-on-chronic related to weakness associated with hypoxia and intellectual disability. Prognosis for improvement is guarded given patient's current clinical presentation. A modified PO diet is recommended at this time. An instrumental swallowing evaluation is not recommended at this time as one was completed 2 days prior. Dysphagia Outcome Severity Scale: Level 3: Moderate dysphagia- Total assisstance, supervision or strategies. Two or more diet consistencies restricted     Recommendations  Requires SLP Intervention: Yes  Recommendations: Dysphagia treatment;Assistance with meals  Referral To: Dietician  D/C Recommendations: Ongoing speech therapy is recommended during this hospitalization  Diet Solids Recommendation: Pureed  Liquid Consistency Recommendation: Mildly Thick (Nectar)  Compensatory Swallowing Strategies : No straws;Eat/Feed slowly;Upright as possible for all oral intake;Small bites/sips;Assist feed  Recommended Form of Meds: Crushed in puree as able  Therapeutic Interventions: Patient/Family education;Diet tolerance monitoring  Duration of Treatment: LOS or until goals are met  Frequency of Treatment: 1-2x/week    Prognosis  Speech Therapy Prognosis  Prognosis: Guarded  Prognosis Considerations: Medical Diagnosis;Participation Level    Education  Individuals consulted  Consulted and agree with results and recommendations: RN  RN Name: ALEXANDRO Duvall    Treatment/Goals  Short-term Goals  Timeframe for Short-term Goals: 1 week  Goal 1: Following education, staff/caregivers will demonstrate safe feeding strategies for safe and efficient swallow of recommended diet in all given opportunities. Goal 2: Pt will tolerate the recommended diet level with no overt s/s of aspiration or difficulty.   Long-term Goals  Timeframe for Long-term Goals: 1 week  Goal 1: Patient will tolerate the least restrictive diet without adverse outcomes. Dysphagia Goals: The patient will tolerate recommended diet without observed clinical signs of aspiration    Safety Devices  Safety Devices  Safety Devices in place: Yes  Type of devices: Bed alarm in place;Call light within reach  Restraints Initially in Place: No    Pain Assessment  Patient does not appear in pain. Pain Re-assessment  Patient does not appear in pain.       Therapy Time  SLP Individual Minutes  Time In: 0815  Time Out: 4129  Minutes: 18          Signature: Electronically signed by EUNICE Lamas on 10/20/2022 at 9:57 AM

## 2022-10-20 NOTE — CARE COORDINATION
[facility] [place] Case Management Initial Discharge Assessment    Met with ALEXANDRO Rg Manager at [facility]  to discuss discharge plan. PCP: Steffi Ku MD                                Date of Last Visit: 3 mo    [facility] Patient: No        [facility] Notified: no    If no PCP, list provided? N/A    Discharge Planning    Living Arrangements:  dependent on staff at [facility] do you live with? Staff at [address] [place] [address] helps you with your care:  staff at [facility]     If lives at home:     Do you have any barriers navigating in your home? no    Patient can perform ADL? Patient requires help with most ADL's- patient is able to walk short distances    Current Services (outpatient and in home) :   staff from [facility]    Dialysis: No    Is transportation available to get to your appointments? Yes    DME Equipment:  yes - hospital bed, wheelchair for long distances    Respiratory equipment: None    Respiratory provider:  no     Pharmacy:  yes - [facility]     Consult with Medication Assistance Program?  No      Patient agreeable to [facility]? No    Patient agreeable to SNF/Rehab? No    Other discharge needs identified? N/A    Does Patient Have a High-Risk for Readmission Diagnosis (CHF, PN, MI, COPD)? No      Initial Discharge Plan? (Note: please see concurrent daily documentation for any updates after initial note).     PATIENT WILL RETURN TO [facility]- [phone] EVAL PRIOR TO DISCHARGE     Readmission Risk              Risk of Unplanned Readmission:  0       DCCOP COMPLETED  Electronically signed by EUSEBIA Irizarry, SYMONE on 10/20/2022 at 2:48 PM

## 2022-10-21 VITALS
WEIGHT: 168 LBS | OXYGEN SATURATION: 97 % | RESPIRATION RATE: 18 BRPM | DIASTOLIC BLOOD PRESSURE: 57 MMHG | HEART RATE: 93 BPM | BODY MASS INDEX: 27.99 KG/M2 | HEIGHT: 65 IN | SYSTOLIC BLOOD PRESSURE: 131 MMHG | TEMPERATURE: 98 F

## 2022-10-21 PROBLEM — J15.3 COMMUNITY ACQUIRED PNEUMONIA DUE TO GROUP B STREPTOCOCCUS (HCC): Status: ACTIVE | Noted: 2022-10-21

## 2022-10-21 LAB
ALBUMIN SERPL-MCNC: 3.2 G/DL (ref 3.5–4.6)
ALP BLD-CCNC: 63 U/L (ref 40–130)
ALT SERPL-CCNC: 16 U/L (ref 0–33)
ANION GAP SERPL CALCULATED.3IONS-SCNC: 7 MEQ/L (ref 9–15)
AST SERPL-CCNC: 20 U/L (ref 0–35)
BASOPHILS ABSOLUTE: 0 K/UL (ref 0–0.2)
BASOPHILS RELATIVE PERCENT: 0.5 %
BILIRUB SERPL-MCNC: 0.5 MG/DL (ref 0.2–0.7)
BLOOD CULTURE, ROUTINE: NORMAL
BUN BLDV-MCNC: 6 MG/DL (ref 8–23)
CALCIUM SERPL-MCNC: 8.5 MG/DL (ref 8.5–9.9)
CHLORIDE BLD-SCNC: 107 MEQ/L (ref 95–107)
CO2: 29 MEQ/L (ref 20–31)
CREAT SERPL-MCNC: 0.46 MG/DL (ref 0.5–0.9)
CULTURE, BLOOD 2: NORMAL
EOSINOPHILS ABSOLUTE: 0.2 K/UL (ref 0–0.7)
EOSINOPHILS RELATIVE PERCENT: 3.1 %
GFR SERPL CREATININE-BSD FRML MDRD: >60 ML/MIN/{1.73_M2}
GLOBULIN: 2.3 G/DL (ref 2.3–3.5)
GLUCOSE BLD-MCNC: 103 MG/DL (ref 70–99)
HCT VFR BLD CALC: 32.6 % (ref 37–47)
HEMOGLOBIN: 11.5 G/DL (ref 12–16)
LYMPHOCYTES ABSOLUTE: 1.8 K/UL (ref 1–4.8)
LYMPHOCYTES RELATIVE PERCENT: 36.9 %
MCH RBC QN AUTO: 33.5 PG (ref 27–31.3)
MCHC RBC AUTO-ENTMCNC: 35.3 % (ref 33–37)
MCV RBC AUTO: 94.7 FL (ref 79.4–94.8)
MONOCYTES ABSOLUTE: 0.7 K/UL (ref 0.2–0.8)
MONOCYTES RELATIVE PERCENT: 13.1 %
NEUTROPHILS ABSOLUTE: 2.3 K/UL (ref 1.4–6.5)
NEUTROPHILS RELATIVE PERCENT: 46.4 %
PDW BLD-RTO: 12.9 % (ref 11.5–14.5)
PLATELET # BLD: 142 K/UL (ref 130–400)
POTASSIUM SERPL-SCNC: 3.8 MEQ/L (ref 3.4–4.9)
RBC # BLD: 3.45 M/UL (ref 4.2–5.4)
SODIUM BLD-SCNC: 143 MEQ/L (ref 135–144)
TOTAL PROTEIN: 5.5 G/DL (ref 6.3–8)
WBC # BLD: 5 K/UL (ref 4.8–10.8)

## 2022-10-21 PROCEDURE — 80053 COMPREHEN METABOLIC PANEL: CPT

## 2022-10-21 PROCEDURE — 36415 COLL VENOUS BLD VENIPUNCTURE: CPT

## 2022-10-21 PROCEDURE — 96366 THER/PROPH/DIAG IV INF ADDON: CPT

## 2022-10-21 PROCEDURE — 96372 THER/PROPH/DIAG INJ SC/IM: CPT

## 2022-10-21 PROCEDURE — 2580000003 HC RX 258: Performed by: INTERNAL MEDICINE

## 2022-10-21 PROCEDURE — 85025 COMPLETE CBC W/AUTO DIFF WBC: CPT

## 2022-10-21 PROCEDURE — 6360000002 HC RX W HCPCS: Performed by: INTERNAL MEDICINE

## 2022-10-21 PROCEDURE — 1210000000 HC MED SURG R&B

## 2022-10-21 PROCEDURE — 6370000000 HC RX 637 (ALT 250 FOR IP): Performed by: INTERNAL MEDICINE

## 2022-10-21 RX ORDER — AMOXICILLIN AND CLAVULANATE POTASSIUM 250; 62.5 MG/5ML; MG/5ML
500 POWDER, FOR SUSPENSION ORAL 2 TIMES DAILY
Qty: 140 ML | Refills: 0 | Status: SHIPPED | OUTPATIENT
Start: 2022-10-21

## 2022-10-21 RX ORDER — GREEN TEA/HOODIA GORDONII 315-12.5MG
1 CAPSULE ORAL 2 TIMES DAILY
Qty: 60 TABLET | Refills: 0 | Status: SHIPPED | OUTPATIENT
Start: 2022-10-21 | End: 2022-11-20

## 2022-10-21 RX ADMIN — ATORVASTATIN CALCIUM 40 MG: 40 TABLET, FILM COATED ORAL at 08:46

## 2022-10-21 RX ADMIN — PIPERACILLIN AND TAZOBACTAM 3375 MG: 3; .375 INJECTION, POWDER, LYOPHILIZED, FOR SOLUTION INTRAVENOUS at 06:26

## 2022-10-21 RX ADMIN — QUETIAPINE FUMARATE 50 MG: 50 TABLET ORAL at 08:46

## 2022-10-21 RX ADMIN — LACTULOSE 20 G: 20 SOLUTION ORAL at 08:47

## 2022-10-21 RX ADMIN — CARBOXYMETHYLCELLULOSE SODIUM 1 DROP: 10 GEL OPHTHALMIC at 08:48

## 2022-10-21 RX ADMIN — CARBOXYMETHYLCELLULOSE SODIUM 1 DROP: 10 GEL OPHTHALMIC at 18:55

## 2022-10-21 RX ADMIN — ENOXAPARIN SODIUM 40 MG: 100 INJECTION SUBCUTANEOUS at 08:47

## 2022-10-21 RX ADMIN — ASPIRIN 81 MG 81 MG: 81 TABLET ORAL at 08:47

## 2022-10-21 RX ADMIN — PIPERACILLIN AND TAZOBACTAM 3375 MG: 3; .375 INJECTION, POWDER, LYOPHILIZED, FOR SOLUTION INTRAVENOUS at 15:01

## 2022-10-21 RX ADMIN — BUSPIRONE HYDROCHLORIDE 30 MG: 10 TABLET ORAL at 08:46

## 2022-10-21 RX ADMIN — SODIUM CHLORIDE, POTASSIUM CHLORIDE, SODIUM LACTATE AND CALCIUM CHLORIDE: 600; 310; 30; 20 INJECTION, SOLUTION INTRAVENOUS at 05:12

## 2022-10-21 RX ADMIN — DIVALPROEX SODIUM 500 MG: 125 CAPSULE, COATED PELLETS ORAL at 08:46

## 2022-10-21 RX ADMIN — ATROPINE SULFATE 1 DROP: 10 SOLUTION/ DROPS OPHTHALMIC at 08:48

## 2022-10-21 RX ADMIN — QUETIAPINE FUMARATE 50 MG: 50 TABLET ORAL at 15:01

## 2022-10-21 NOTE — PROGRESS NOTES
Pt assessment and vitals complete. Patient would not hold still for blood pressure cuff to be used. Vitals obtained per flowsheets. Meds per emar, tolerated well. Pt is a feed, has good appetite, lungs clear and diminished, on room air. Afebrile. Denies needs otherwise.

## 2022-10-21 NOTE — PROGRESS NOTES
Attempted to call report x2 to [phone]. Called [facility] phone number as well as the number listed in chart for [facility] RN, no answer at this time.

## 2022-10-21 NOTE — DISCHARGE INSTR - COC
Continuity of Care Form    Patient Name: Tara Wakefield   :  1940  MRN:  [id number]    Admit date:  10/19/2022  Discharge date:  10/21/2022    Code Status Order: DNR-CC   Advance Directives:     Admitting Physician:  Jessica Marsh DO  PCP: Anastacio Patterson MD    Discharging Nurse:  [address] [place] [address] Unit/Room#: Z320/T879-22  Discharging Unit Phone Number: [phone]    Emergency Contact:   Extended Emergency Contact Information  Primary Emergency Contact: Jason Carlson [address] Phone: [phone]  Work Phone: [phone]  Relation: Legal Guardian  Secondary Emergency Contact: Belen Ryan RN  Address: [address] [place] [address] Phone: [phone]  Mobile Phone: [phone]  Relation: None    Past Surgical History:  Past Surgical History:   Procedure Laterality Date    COLONOSCOPY N/A 3/22/2019    COLONOSCOPY performed by Lin Sun MD at [facility]       Immunization History:   Immunization History   Administered Date(s) Administered    BCG (Creasie Salon) 2008, 2009, 2010, 2011, 2012, 2013, 03/10/2015, 2016, 2017    COVID-19[place] [address] [facility] Primary or Immunocompromised, (age 12y+), IM, 100 mcg/0.5mL 2021, 2021    Hepatitis A 1984    Hepatitis B 1984, 2017    Hepatitis B Adol 2 Dose (Recombivax HB) 1984, 2017    Influenza Vaccine, unspecified formulation 10/11/2012, 10/18/2013, 10/26/2014, 10/23/2015, 2016, 10/25/2018    Influenza Virus Vaccine 10/20/2010, 10/11/2012, 10/18/2013, 10/26/2014, 10/23/2015, 2016, 10/25/2018    Influenza Whole 10/30/2010, 10/30/2011, 10/30/2012    Influenza, High Dose (Fluzone 65 yrs and older) 2017    Pneumococcal Conjugate 13-valent (Tffsuwy29) 10/10/2017    Pneumococcal Conjugate 7-valent (Prevnar7) 10/11/2004    Pneumococcal Polysaccharide (Pnzgcsbii52) 10/11/2004, 2010    Pneumococcal Vaccine 10/11/2004    Td (Adult), 5 Lf Tetanus Toxoid, Pf (Tenivac, Decavac) 05/15/2008    Tdap (Boostrix, Adacel) 05/15/2008, 2018    Tetanus 05/15/2008    Zoster Live (Zostavax) 2017    Zoster Recombinant (Shingrix) 2019, 2020       Active Problems:  Patient Active Problem List   Diagnosis Code    MR (mental retardation) F79    Seizure disorder ([facility] G40.909    Legally blind H54.8    Chronic rhinitis J31.0    Mood disorder ([facility] F39    Vitamin D deficiency E55.9    Full code status Z78.9    Dietary restriction Z71.3    Dyslipidemia E78.5    Periodontal disease K05.6    Mental retardation F79    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, T50.905A    Leucopenia D72.819    Aphakia, left eye H27.02    Hyphema, 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    Pneumonia due to COVID-19 virus U07.1, J12.82    Other thrombophilia ([facility]) D68.69    Sepsis ([facility]) A41.9    Aspiration of food, sequela T17.928S    Hypoxia R09.02    Community acquired pneumonia due to group B Streptococcus ([facility] J15.3       Isolation/Infection:   Isolation            No Isolation          Patient Infection Status       Infection Onset Added Last Indicated Last Indicated By Review Planned Expiration Resolved Resolved By    None active    Resolved    COVID-19 (Rule Out) 10/19/22 10/19/22 10/19/22 COVID-19, Rapid (Ordered)   10/19/22 Rule-Out Test Resulted    COVID-19 (Rule Out) 10/15/22 10/15/22 10/15/22 COVID-19, Rapid (Ordered)   10/15/22 Rule-Out Test Resulted    COVID-19 20 COVID-19   20     COVID-19 (Rule Out) 20 COVID-19 (Ordered)   20 Rule-Out Test Resulted            Nurse Assessment:  Last Vital Signs: /78   Pulse 90   Temp 98 °F (36.7 °C) (Axillary)   Resp 18   Ht 5' 5\" (1.651 m)   Wt 168 lb (76.2 kg)   SpO2 94%   BMI 27.96 kg/m²     Last documented pain score (0-10 scale):    Last Weight:    Wt Readings from Last 1 Encounters:   10/19/22 168 lb (76.2 kg)     Mental Status:   VIDYA    IV Access:  - None    Nursing Mobility/ADLs:  Walking   Dependent  Transfer  Dependent  Bathing  Dependent  Dressing  Dependent  Toileting  Dependent  Feeding  Dependent  Med Admin  Dependent  Med Delivery   crushed and prefers mixed with pudding    Wound Care Documentation and Therapy:        Elimination:  Continence: Bowel: No  Bladder: No  Urinary Catheter: None   Colostomy/Ileostomy/Ileal Conduit: No       Date of Last BM: 10/21/2022    Intake/Output Summary (Last 24 hours) at 10/21/2022 1407  Last data filed at 10/21/2022 0935  Gross per 24 hour   Intake 2176.07 ml   Output 2350 ml   Net -173.93 ml     I/O last 3 completed shifts: In: 2772.5 [P.O.:540; I.V.:2058.4; IV Piggyback:174.1]  Out: 2900 [Urine:2900]    Safety Concerns: At Risk for Falls and Aspiration Risk    Impairments/Disabilities:      Speech and Vision    Nutrition Therapy:  Current Nutrition Therapy:   - Oral Diet:  General    Routes of Feeding: Oral PUREE DIET  Liquids: Mellen Thick Liquids  Daily Fluid Restriction: no  Last Modified Barium Swallow with Video (Video Swallowing Test): not done    Treatments at the Time of Hospital Discharge:   Respiratory Treatments: NA  Oxygen Therapy:  is not on home oxygen therapy.   Ventilator:    - No ventilator support    Rehab Therapies: Physical Therapy and Occupational Therapy  Weight Bearing Status/Restrictions: No weight bearing restrictions  Other Medical Equipment (for information only, NOT a DME order):  hospital bed  Other Treatments: NA    Patient's personal belongings (please select all that are sent with patient):  None    RN SIGNATURE:  Electronically signed by Jennifer Estrada RN on 10/21/22 at 2:25 PM EDT    CASE MANAGEMENT/SOCIAL WORK SECTION    Inpatient Status Date: ***    Readmission Risk Assessment Score:  Readmission Risk              Risk of Unplanned Readmission:  18           Discharging to Facility/ Agency   Name:   Address:  Phone:  Fax:    Dialysis Facility (if applicable)   Name:  Address:  Dialysis Schedule:  Phone:  Fax:    / signature: {Esignature:[id number]}    PHYSICIAN SECTION    Prognosis: Fair    Condition at Discharge: Stable    Rehab Potential (if transferring to Rehab): Good    Recommended Labs or Other Treatments After Discharge: , abx     Physician Certification: I certify the above information and transfer of Zamzam Belts  is necessary for the continuing treatment of the diagnosis listed and that she requires Home Care for less 30 days.      Update Admission H&P: No change in H&P    PHYSICIAN SIGNATURE:  Electronically signed by Lai Mack DO on 10/21/22 at 2:07 PM EDT

## 2022-10-21 NOTE — PROGRESS NOTES
Patient scheduled for pick of at [address] per [address]. Will inform group home when report is called.

## 2022-10-21 NOTE — FLOWSHEET NOTE
Assessment completed. VS WNL. Alert; Unable to access orientation; pt nonverbal; unable to follow command; Blind. PM meds given crushed in pudding-Pt tolerate well with nectar thick water. Lung clear, diminished. Abd sounds active; Pt having soft brown BM. Call light within reach. Bed in lowest position.

## 2022-10-21 NOTE — PROGRESS NOTES
CLINICAL PHARMACY NOTE: MEDS TO BEDS    Total # of Prescriptions Filled: 0   The following medications were delivered to the patient:    Additional Documentation:    Patient was notified that amoxicillin liquid was too soon per insurance. The amoxicillin was already filled at  in [place]. The patient wanted to pick everything up in one location, so we transferred both meds to . The RN was also notified that meds were sent to the normal pharmacy on file.

## 2022-10-21 NOTE — CARE COORDINATION
This LSW called and spoke with Costa Menezes, RN at [facility] and also spoke with Donna Polanco via phone. I notified both of them that transportation is scheduled to transfer patient to [facility] this evening at 8pm.  IMM completed with guardian.   Electronically signed by EUSEBIA Zamora, LSW on 10/21/22 at 2:50 PM EDT

## 2022-10-24 LAB
BLOOD CULTURE, ROUTINE: NORMAL
CULTURE, BLOOD 2: NORMAL

## 2023-01-11 ENCOUNTER — APPOINTMENT (OUTPATIENT)
Dept: GENERAL RADIOLOGY | Age: 83
End: 2023-01-11
Payer: MEDICARE

## 2023-01-11 ENCOUNTER — HOSPITAL ENCOUNTER (EMERGENCY)
Age: 83
Discharge: HOME | End: 2023-01-11
Attending: EMERGENCY MEDICINE
Payer: MEDICARE

## 2023-01-11 VITALS
HEART RATE: 100 BPM | SYSTOLIC BLOOD PRESSURE: 101 MMHG | TEMPERATURE: 97.6 F | RESPIRATION RATE: 24 BRPM | DIASTOLIC BLOOD PRESSURE: 76 MMHG | OXYGEN SATURATION: 94 %

## 2023-01-11 DIAGNOSIS — U07.1 COVID-19 VIRUS INFECTION: Primary | ICD-10-CM

## 2023-01-11 LAB
INFLUENZA A BY PCR: NEGATIVE
INFLUENZA B BY PCR: NEGATIVE
RSV BY PCR: NEGATIVE
SARS-COV-2, NAAT: DETECTED

## 2023-01-11 PROCEDURE — 87635 SARS-COV-2 COVID-19 AMP PRB: CPT

## 2023-01-11 PROCEDURE — 99284 EMERGENCY DEPT VISIT MOD MDM: CPT

## 2023-01-11 PROCEDURE — 6360000002 HC RX W HCPCS: Performed by: EMERGENCY MEDICINE

## 2023-01-11 PROCEDURE — 87634 RSV DNA/RNA AMP PROBE: CPT

## 2023-01-11 PROCEDURE — 96372 THER/PROPH/DIAG INJ SC/IM: CPT

## 2023-01-11 PROCEDURE — 87502 INFLUENZA DNA AMP PROBE: CPT

## 2023-01-11 PROCEDURE — 71045 X-RAY EXAM CHEST 1 VIEW: CPT

## 2023-01-11 RX ORDER — DEXAMETHASONE SODIUM PHOSPHATE 10 MG/ML
8 INJECTION, SOLUTION INTRAMUSCULAR; INTRAVENOUS ONCE
Status: COMPLETED | OUTPATIENT
Start: 2023-01-11 | End: 2023-01-11

## 2023-01-11 RX ADMIN — DEXAMETHASONE SODIUM PHOSPHATE 8 MG: 10 INJECTION, SOLUTION INTRAMUSCULAR; INTRAVENOUS at 18:31

## 2023-01-11 NOTE — ED PROVIDER NOTES
2000 \[facility]\"" ED  eMERGENCY dEPARTMENT eNCOUnter      Pt Name: Issac Flanagan  MRN: [id number]  [place] 1940  Date of evaluation: 1/11/2023  Provider: Loni Cifuentes MD    CHIEF COMPLAINT       Chief Complaint   Patient presents with    Positive For Covid-19     Tested today at facility, pt was shakey and lethargic, pt nonverbal, severe mrdd, pt is blind         HISTORY OF PRESENT ILLNESS   (Location/Symptom, Timing/Onset,Context/Setting, Quality, Duration, Modifying Factors, Severity)  Note limiting factors. Issac Flanagan is a 80 y.o. female who presents to the emergency department reside in a group home with known history of legally blind developmental delay mental retardation nonverbal history of seizure disorder has chronic rhinitis routinely checking the resident for [place] patient came back positive for COVID 99 so sent here paramedics tried to put oxygen but she did not cooperate brought here in stable condition to be checked out has no fever no vomiting moving all EXTR very    HPI    NursingNotes were reviewed. REVIEW OF SYSTEMS    (2-9 systems for level 4, 10 or more for level 5)     Review of Systems   Unable to perform ROS: Patient nonverbal     Except as noted above the remainder of the review of systems was reviewed and negative.        PAST MEDICAL HISTORY     Past Medical History:   Diagnosis Date    Ceruminosis     Chronic rhinitis     Dyslipidemia     History of encephalitis     Large bowel obstruction (HCC)     Legally blind     Mood disorder ([address]     MR (mental retardation)     Osteoporosis     Periodontal disease     Pneumonia 12/9/2018    SBO (small bowel obstruction) ([address] 3/22/2019    Seizure disorder ([address]     Vitamin D deficiency          SURGICALHISTORY       Past Surgical History:   Procedure Laterality Date    COLONOSCOPY N/A 3/22/2019    COLONOSCOPY performed by Laura Zamarripa MD at [address] [facility]       Previous Medications ACETAMINOPHEN (TYLENOL) 325 MG TABLET    Take 650 mg by mouth every 4 hours as needed. May give rectal if unable to tolerate PO administration. Indications: Fever, Pain    ALBUTEROL (PROVENTIL) (2.5 MG/3ML) 0.083% NEBULIZER SOLUTION    Take 2.5 mg by nebulization every 6 hours as needed for Wheezing    AMOXICILLIN-CLAVULANATE (AUGMENTIN) 250-62.5 MG/5ML SUSPENSION    Take 10 mLs by mouth 2 times daily    ASPIRIN 81 MG TABLET    Take 81 mg by mouth daily    ATORVASTATIN (LIPITOR) 40 MG TABLET    Take 40 mg by mouth daily    ATROPINE 1 % OPHTHALMIC SOLUTION    Place 1 drop into the left eye daily    BUSPIRONE (BUSPAR) 7.5 MG TABLET    Take 1 tablet by mouth 2 times daily    CALCIUM-VITAMIN D (OSCAL-500) 500-200 MG-UNIT PER TABLET    Take 1 tablet by mouth 2 times daily. Administer with food. Indications: supplement    CARBOXYMETHYLCELLULOSE 1 % OPHTHALMIC SOLUTION    Place 1 drop into both eyes in the morning, at noon, in the evening, and at bedtime    CETIRIZINE (ZYRTEC) 10 MG TABLET    Take 10 mg by mouth daily    CHLORHEXIDINE (PERIDEX) 0.12 % SOLUTION    Take 15 mLs by mouth 2 times daily Apply to gums. CHOLECALCIFEROL (VITAMIN D) 50 MCG (2000 UT) CAPS CAPSULE    Take 5,000 Units by mouth Give one capsule by mouth every week on Modays    CLONAZEPAM (KLONOPIN) 1 MG TABLET    Take 1 mg by mouth in the morning, at noon, and at bedtime. Indications: Agitation, Feeling Anxious, Trouble Sleeping    DIVALPROEX (DEPAKOTE) 250 MG DR TABLET    Take 500 mg by mouth 2 times daily     DOCUSATE SODIUM (COLACE) 100 MG CAPSULE    Take 200 mg by mouth daily Indications: Chronic Constipation Give 2 capsules daily. Hold for loose stools. FLUTICASONE (FLONASE) 50 MCG/ACT NASAL SPRAY    1 spray by Each Nostril route in the morning and at bedtime    LACTULOSE (CHRONULAC) 10 GM/15ML SOLUTION    Take 15 mLs by mouth daily    MAGNESIUM HYDROXIDE (MILK OF MAGNESIA PO)    Take 30 mLs by mouth daily as needed.  Indications: Constipation    MULTIPLE VITAMIN (MULTIVITAMIN PO)    Take 1 tablet by mouth daily. Indications: multivitamin    MUPIROCIN (BACTROBAN) 2 % OINTMENT    Apply topically in the morning and at bedtime Apply intranasally BID    OXYGEN    Inhale 2 L/min into the lungs as needed. ADMINISTER VIA N/C.  TITRATE PRN TO MAINTAIN PULSE OX ABOVE 92%. Indications: Hypoxia    PROTEIN POWD    Take by mouth Give 1 teaspoon (5ml) in water/juice BID    PSYLLIUM (REGULOID) 58.6 % POWDER    Take 1 packet by mouth 3 times daily Take by mouth 3 times daily. QUETIAPINE (SEROQUEL) 100 MG TABLET    Take 50 mg by mouth 3 times daily    SODIUM PHOSPHATES (FLEET)    Place 1 enema rectally daily as needed. Indications: Constipation    VITAMIN D (ERGOCALCIFEROL) 10527 UNITS CAPS CAPSULE    Take 50,000 Units by mouth once a week Indications: Vitamin D Deficiency On Mondays       ALLERGIES     Patient has no known allergies. FAMILY HISTORY     History reviewed. No pertinent family history. SOCIAL HISTORY       Social History     Socioeconomic History    Marital status: Single     Spouse name: None    Number of children: None    Years of education: None    Highest education level: None   Tobacco Use    Smoking status: Never    Smokeless tobacco: Never   Vaping Use    Vaping Use: Never used   Substance and Sexual Activity    Alcohol use: No    Drug use: No    Sexual activity: Never   Social History Narrative    ** Merged History Encounter **            SCREENINGS      @FLOW([id number])@      PHYSICAL EXAM    (up to 7 for level 4, 8 or more for level 5)     ED Triage Vitals [01/11/23 1733]   BP Temp Temp Source Heart Rate Resp SpO2 Height Weight   -- 97.6 °F (36.4 °C) Temporal (!) 104 24 (!) 88 % -- --       Physical Exam  Vitals and nursing note reviewed. Constitutional:       Comments: Alert sometimes cooperative sometimes agitated when being touched no acute distress noted   HENT:      Head: Normocephalic.       Right Ear: Tympanic membrane, ear canal and external ear normal.      Left Ear: Tympanic membrane and external ear normal.      Nose: Nose normal.      Mouth/Throat:      Mouth: Mucous membranes are moist.      Pharynx: No oropharyngeal exudate or posterior oropharyngeal erythema. Neck:      Vascular: No carotid bruit. Cardiovascular:      Rate and Rhythm: Regular rhythm. Pulses: Normal pulses. Heart sounds: No murmur heard. No friction rub. No gallop. Pulmonary:      Effort: Pulmonary effort is normal.      Breath sounds: No stridor. No wheezing, rhonchi or rales. Comments: Patient comes respiratory system work of breathing not labored no use of accessory muscles  Chest:      Chest wall: No tenderness. Abdominal:      General: Bowel sounds are normal. There is no distension. Palpations: Abdomen is soft. Tenderness: There is no abdominal tenderness. There is no right CVA tenderness, guarding or rebound. Hernia: No hernia is present. Musculoskeletal:         General: Swelling present. No deformity. Cervical back: Neck supple. No rigidity or tenderness. Right lower leg: No edema. Comments: Patient went to the lower extremities patient has minimal puffiness of the legs but no cellulitis noted no tenderness noted   Lymphadenopathy:      Cervical: No cervical adenopathy. Skin:     Capillary Refill: Capillary refill takes less than 2 seconds. Coloration: Skin is not jaundiced or pale. Findings: No bruising, erythema, lesion or rash. Neurological:      Mental Status: She is alert.       Comments: Tenting of the neurological junction patient very uncooperative for neurological examination but noted moving all extremities patient is alert and verbal follow instructions   Psychiatric:         Mood and Affect: Mood normal.       DIAGNOSTIC RESULTS     EKG: All EKG's are interpreted by the Emergency Department Physician who either signs or Co-signsthis chart in the absence of a cardiologist.        RADIOLOGY:   Kim Bee such as CT, Ultrasound and MRI are read by the radiologist. Plain radiographic images are visualized and preliminarily interpreted by the emergency physician with the below findings:        Interpretation per the Radiologist below, if available at the time ofthis note:    XR CHEST PORTABLE   Final Result   No acute process. ED BEDSIDE ULTRASOUND:   Performed by ED Physician - none    LABS:  Labs Reviewed   COVID-19, RAPID - Abnormal; Notable for the following components:       Result Value    SARS-CoV-2, NAAT DETECTED (*)     All other components within normal limits   RSV RAPID ANTIGEN   RAPID INFLUENZA A/B ANTIGENS       All other labs were within normal range or not returned as of this dictation. EMERGENCY DEPARTMENT COURSE and DIFFERENTIAL DIAGNOSIS/MDM:   Vitals:    Vitals:    01/11/23 1733 01/11/23 1736 01/11/23 1739   BP:   101/76   Pulse: (!) 104  100   Resp: 24     Temp: 97.6 °F (36.4 °C)     TempSrc: Temporal     SpO2: (!) 88% 92% 94%     MDM  Number of Diagnoses or Management Options  COVID-19 virus infection  Diagnosis management comments: 1. Legally blind mental retardation asymptomatic patient noted to have a routine COVID testing in the group home where she came back positive and sent here for the evaluation patient in no acute distress chest x-ray performed has no pneumonia repeat COVID testing confirmed the patient has COVID positive patient be sent back to the group home for further care       Amount and/or Complexity of Data Reviewed  Tests in the radiology section of CPT®: ordered and reviewed        CRITICAL CARE TIME   Total Critical Care time was minutes, excluding separately reportableprocedures. There was a high probability of clinicallysignificant/life threatening deterioration in the patient's condition which required my urgent intervention.     CONSULTS:  None    PROCEDURES:  Unless otherwise noted below, none Procedures    FINAL IMPRESSION      1. COVID-19 virus infection          DISPOSITION/PLAN   DISPOSITION        PATIENT REFERRED TO:  Osmel Bergman MD  [address] [phone]    In 1 week      DISCHARGE MEDICATIONS:  New Prescriptions    No medications on file          (Please note that portions of this note were completed with a voice recognition program.  Efforts were made to edit the dictations but occasionally words are mis-transcribed.)    Loni Cifuentes MD (electronically signed)  Attending Emergency Physician        Loni Cifuentes MD  01/11/23 9547

## 2023-01-28 ENCOUNTER — HOSPITAL ENCOUNTER (OUTPATIENT)
Dept: LAB | Age: 83
Discharge: HOME | End: 2023-01-28
Payer: MEDICARE

## 2023-01-28 LAB
ALBUMIN SERPL-MCNC: 3.6 G/DL (ref 3.5–4.6)
ALP BLD-CCNC: 85 U/L (ref 40–130)
ALT SERPL-CCNC: 17 U/L (ref 0–33)
AMMONIA: 84 UMOL/L (ref 11–51)
ANION GAP SERPL CALCULATED.3IONS-SCNC: 12 MEQ/L (ref 9–15)
AST SERPL-CCNC: 27 U/L (ref 0–35)
BASOPHILS ABSOLUTE: 0 K/UL (ref 0–0.2)
BASOPHILS RELATIVE PERCENT: 0.4 %
BILIRUB SERPL-MCNC: 0.4 MG/DL (ref 0.2–0.7)
BUN BLDV-MCNC: 13 MG/DL (ref 8–23)
CALCIUM SERPL-MCNC: 9 MG/DL (ref 8.5–9.9)
CHLORIDE BLD-SCNC: 100 MEQ/L (ref 95–107)
CHOLESTEROL, TOTAL: 171 MG/DL (ref 0–199)
CO2: 26 MEQ/L (ref 20–31)
CREAT SERPL-MCNC: 0.51 MG/DL (ref 0.5–0.9)
EOSINOPHILS ABSOLUTE: 0 K/UL (ref 0–0.7)
EOSINOPHILS RELATIVE PERCENT: 0.6 %
GFR SERPL CREATININE-BSD FRML MDRD: >60 ML/MIN/{1.73_M2}
GLOBULIN: 2.5 G/DL (ref 2.3–3.5)
GLUCOSE BLD-MCNC: 183 MG/DL (ref 70–99)
HCT VFR BLD CALC: 40.5 % (ref 37–47)
HDLC SERPL-MCNC: 42 MG/DL (ref 40–59)
HEMOGLOBIN: 13.6 G/DL (ref 12–16)
LDL CHOLESTEROL CALCULATED: 80 MG/DL (ref 0–129)
LYMPHOCYTES ABSOLUTE: 1.9 K/UL (ref 1–4.8)
LYMPHOCYTES RELATIVE PERCENT: 33.2 %
MCH RBC QN AUTO: 31.5 PG (ref 27–31.3)
MCHC RBC AUTO-ENTMCNC: 33.5 % (ref 33–37)
MCV RBC AUTO: 93.8 FL (ref 79.4–94.8)
MONOCYTES ABSOLUTE: 0.4 K/UL (ref 0.2–0.8)
MONOCYTES RELATIVE PERCENT: 7.1 %
NEUTROPHILS ABSOLUTE: 3.4 K/UL (ref 1.4–6.5)
NEUTROPHILS RELATIVE PERCENT: 58.7 %
PDW BLD-RTO: 14.2 % (ref 11.5–14.5)
PLATELET # BLD: 173 K/UL (ref 130–400)
POTASSIUM SERPL-SCNC: 4.5 MEQ/L (ref 3.4–4.9)
RBC # BLD: 4.31 M/UL (ref 4.2–5.4)
SODIUM BLD-SCNC: 138 MEQ/L (ref 135–144)
TOTAL PROTEIN: 6.1 G/DL (ref 6.3–8)
TRIGL SERPL-MCNC: 245 MG/DL (ref 0–150)
WBC # BLD: 5.7 K/UL (ref 4.8–10.8)

## 2023-01-28 PROCEDURE — 80061 LIPID PANEL: CPT

## 2023-01-28 PROCEDURE — 36415 COLL VENOUS BLD VENIPUNCTURE: CPT

## 2023-01-28 PROCEDURE — 82140 ASSAY OF AMMONIA: CPT

## 2023-01-28 PROCEDURE — 80053 COMPREHEN METABOLIC PANEL: CPT

## 2023-01-28 PROCEDURE — 82306 VITAMIN D 25 HYDROXY: CPT

## 2023-01-28 PROCEDURE — 85025 COMPLETE CBC W/AUTO DIFF WBC: CPT

## 2023-01-28 PROCEDURE — 80164 ASSAY DIPROPYLACETIC ACD TOT: CPT

## 2023-08-07 ENCOUNTER — HOSPITAL ENCOUNTER (OUTPATIENT)
Dept: LAB | Age: 83
Discharge: HOME | End: 2023-08-07

## 2023-08-07 LAB
REASON FOR REJECTION: NORMAL
REJECTED TEST: NORMAL

## 2023-10-13 NOTE — DISCHARGE SUMMARY
Hospital Medicine Discharge Summary    Collins Michel  :  1940  MRN:  [id number]    Admit date:  10/19/2022  Discharge date:  10/21/2022    Admitting Physician:  Theresa Lee DO  Primary Care Physician:  Steffi Ku MD      Discharge Diagnoses:         Aspiration PNA   Proctitis   Developmental delay   Seizure d/o       Chief Complaint   Patient presents with    Respiratory Distress     Per ems nursing home called for resp distress, patient dc'd from [facility] for same complaint, pulse ox 80% with exertion     Hospital Course:     Patient is an 80-year-old female with a history of developmental delay, seizure disorder who presented to hospital with respiratory distress. Patient initially patient was treated for aspiration pneumonia and proctitis. She was admitted to medical floor and started IV Zosyn and IV fluids. She improved back to her baseline. Patient was discharged on oral Augmentin along with lactobacillus. She is to follow-up with PCP in 5 to 7 days. CODE STATUS changed to DNR CCA per guardian's request.    Exam on discharge:   /78   Pulse 90   Temp 98 °F (36.7 °C) (Axillary)   Resp 18   Ht 5' 5\" (1.651 m)   Wt 168 lb (76.2 kg)   SpO2 94%   BMI 27.96 kg/m²   General appearance: No apparent distress  HEENT: Pupils equal, round, and reactive to light. Conjunctivae/corneas clear. Neck: Supple, with full range of motion. No jugular venous distention. Trachea midline. Respiratory:  Normal respiratory effort. Clear to auscultation, bilaterally without Rales/Wheezes/Rhonchi. Cardiovascular: Regular rate  Abdomen: Soft, non-tender, non-distended with normal bowel sounds. Musculoskeletal: No clubbing, cyanosis or edema bilaterally. Full range of motion without deformity. Skin: Skin color, texture, turgor normal.  No rashes or lesions.   Neuro: Non Focal.   Capillary Refill: Brisk,< 3 seconds   Peripheral Pulses: +2 palpable, equal bilaterally     Patient was seen by the following consultants   Consults:  IP CONSULT TO CASE MANAGEMENT    Significant Diagnostic Studies:    Refer to chart     Please refer to chart if no studies are shown here    CTA Chest W WO  (PE study)    Result Date: 10/19/2022  EXAMINATION: CTA OF THE CHEST WITH AND WITHOUT CONTRAST 10/19/2022 6:39 pm TECHNIQUE: CTA of the chest was performed before and after the administration of intravenous contrast.  Multiplanar reformatted images are provided for review. MIP images are provided for review. Automated exposure control, iterative reconstruction, and/or weight based adjustment of the mA/kV was utilized to reduce the radiation dose to as low as reasonably achievable. COMPARISON: CT abdomen pelvis CT abdomen pelvis 02/03/2021 HISTORY: ORDERING SYSTEM PROVIDED HISTORY: SOB, hypoxia TECHNOLOGIST PROVIDED HISTORY: Reason for exam:->SOB, hypoxia Decision Support Exception - unselect if not a suspected or confirmed emergency medical condition->Emergency Medical Condition (MA) What reading provider will be dictating this exam?->CRC FINDINGS: Aorta: No central or large pulmonary embolism. The distal pulmonary arteries are not well evaluated due to breathing motion artifact. Mediastinum: No evidence of mediastinal lymphadenopathy. The heart and pericardium demonstrate no acute abnormality. Atherosclerotic disease. Unremarkable thyroid. There is suggestion of esophageal thickening throughout its course. Lungs/Pleura: No pleural effusion or pneumothorax. Expiratory phase appearance of the trachea. Bands of linear atelectasis versus scarring in the lungs. Low lung volumes. Grossly stable roughly 6 mm x 6 mm right middle lobe solid pulmonary nodule (series 3, image 119). Upper Abdomen: Atherosclerotic disease. Soft Tissues/Bones: Degenerative changes of the spine and shoulders. No central or large pulmonary embolism. The distal pulmonary arteries are not well evaluated due to breathing motion artifact.  No thoracic aortic aneurysm or dissection. No pneumonia. Low lung volumes. Stable 6 mm solid pulmonary nodule in the right lower lobe; see recommendation below. Atherosclerotic disease. RECOMMENDATIONS: Recommend a low-dose chest CT in 4 months to complete 24 months of follow-up. Note: This recommendation does not apply to patients younger than 35 years, immunocompromised patients, and patients with cancer. F/u in patients with significant comorbidities as clinically warranted. For lung cancer screening, adhere to Lung-RADS guidelines. Reference: Radiology. 2017 Jul; 284(1):228-243 with full reference     CT ABDOMEN PELVIS W IV CONTRAST Additional Contrast? None    Result Date: 10/20/2022  EXAMINATION: CT OF THE ABDOMEN AND PELVIS WITH CONTRAST 10/19/2022 8:04 pm TECHNIQUE: CT of the abdomen and pelvis was performed with the administration of intravenous contrast. Multiplanar reformatted images are provided for review. Automated exposure control, iterative reconstruction, and/or weight based adjustment of the mA/kV was utilized to reduce the radiation dose to as low as reasonably achievable. COMPARISON: 02/03/2021 HISTORY: ORDERING SYSTEM PROVIDED HISTORY: Ab pain TECHNOLOGIST PROVIDED HISTORY: Reason for exam:->Ab pain Additional Contrast?->None Decision Support Exception - unselect if not a suspected or confirmed emergency medical condition->Emergency Medical Condition (MA) What reading provider will be dictating this exam?->CRC FINDINGS: Lower Chest: Limited evaluation of the lower lung fields demonstrates the prominence of the bronchovascular interstitial lung markings is subtle opacities and mild bilateral pleural reaction 1 Organs: The liver demonstrates unremarkable attenuation, no evidence of masses no evidence of intrahepatic biliary dilatation is seen. The gallbladder is contracted, no evidence of gallbladder wall thickening or pericholecystic  stranding. The common bile duct is unremarkable.  The pancreas and spleen demonstrate no evidence of masses. The adrenal glands demonstrate unremarkable contours with no evidence of masses. The kidneys demonstrate no evidence of stones no evidence of renal masses. No evidence of hydronephrosis or hydroureter is seen. GI/Bowel: The stomach is unremarkable, no evidence of masses. No significant distention of the small and large bowel loops is visualized. Thickening of the wall of the rectum with the stranding of the perirectal fat planes visualized but no evidence of collection is seen. Abundance of stool is visualized in the large bowel. Scattered diverticular disease visualized but no evidence of acute diverticulitis. Pelvis: The urinary bladder is not optimally distended. The uterus is atrophic. No evidence of free fluid in the pelvis. No evidence of pelvic mass is seen. Peritoneum/Retroperitoneum: No evidence of free fluid or air within the peritoneal cavity. Bones/Soft Tissues: No evidence of lytic or sclerotic bone lesion is seen. Abdominal wall soft tissues are unremarkable. Extensive degenerative changes of the lumbar spine visualized. Thickening of the wall of the rectum with perirectal stranding suggestive of inflammatory changes would recommend clinical correlation for proctitis. Abundance of stool is visualized in the large bowel. Discharge Medications:         Medication List        START taking these medications      Probiotic Acidophilus Tabs  Take 1 tablet by mouth 2 times daily            CONTINUE taking these medications      acetaminophen 325 MG tablet  Commonly known as: TYLENOL     albuterol (2.5 MG/3ML) 0.083% nebulizer solution  Commonly known as: PROVENTIL     amoxicillin-clavulanate 250-62.5 MG/5ML suspension  Commonly known as:  Augmentin  Take 10 mLs by mouth 2 times daily     aspirin 81 MG tablet     atorvastatin 40 MG tablet  Commonly known as: LIPITOR     atropine 1 % ophthalmic solution     bisacodyl 10 MG suppository  Commonly known as: DULCOLAX  Place 1 suppository rectally daily as needed for Constipation     calcium-vitamin D 500-200 MG-UNIT per tablet  Commonly known as: OSCAL-500     carboxymethylcellulose 1 % ophthalmic solution     cetirizine 10 MG tablet  Commonly known as: ZYRTEC     chlorhexidine 0.12 % solution  Commonly known as: PERIDEX     clonazePAM 1 MG tablet  Commonly known as: KLONOPIN     divalproex 250 MG DR tablet  Commonly known as: DEPAKOTE     docusate sodium 100 MG capsule  Commonly known as: COLACE     fleet 7-19 GM/118ML     fluticasone 50 MCG/ACT nasal spray  Commonly known as: FLONASE     lactulose 10 GM/15ML solution  Commonly known as: CHRONULAC     MILK OF MAGNESIA PO     MULTIVITAMIN PO     mupirocin 2 % ointment  Commonly known as: BACTROBAN     OXYGEN     Protein Powd     QUEtiapine 100 MG tablet  Commonly known as: SEROQUEL     Reguloid 58.6 % powder  Generic drug: psyllium     senna 8.6 MG tablet  Commonly known as: SENOKOT  Take 2 tablets by mouth daily     vitamin D 1.25 MG (88704 UT) Caps capsule  Commonly known as: ERGOCALCIFEROL     vitamin D 50 MCG (2000 UT) Caps capsule            STOP taking these medications      PROBIOTIC PO            ASK your doctor about these medications      busPIRone 7.5 MG tablet  Commonly known as: BUSPAR  Take 1 tablet by mouth 2 times daily               Where to Get Your Medications        These medications were sent to [address] [place] [address]      Phone: [phone]   amoxicillin-clavulanate 250-62.5 MG/5ML suspension  Probiotic Acidophilus Tabs         Disposition:   If discharged to Home, Any [facility] needs that were indicated and/or required as been addressed and set up by Social Work. Condition at discharge: good     Activity: activity as tolerated    Total time taken for discharging this patient: 40 minutes.  Greater than 70% of time was spent focused exclusively on this patient. Time was taken to review chart, discuss plans with consultants, reconciling medications, discussing plan answering questions with patient.      SignedCharyl Lombard, DO  10/21/2022, 2:07 PM  ----------------------------------------------------------------------------------------------------------------------    Tom Apodaca Cyclosporine Counseling:  I discussed with the patient the risks of cyclosporine including but not limited to hypertension, gingival hyperplasia,myelosuppression, immunosuppression, liver damage, kidney damage, neurotoxicity, lymphoma, and serious infections. The patient understands that monitoring is required including baseline blood pressure, CBC, CMP, lipid panel and uric acid, and then 1-2 times monthly CMP and blood pressure.

## 2025-01-22 ENCOUNTER — HOSPITAL ENCOUNTER (EMERGENCY)
Age: 85
Discharge: HOME | End: 2025-01-22
Attending: EMERGENCY MEDICINE
Payer: MEDICARE

## 2025-01-22 ENCOUNTER — APPOINTMENT (OUTPATIENT)
Dept: GENERAL RADIOLOGY | Age: 85
End: 2025-01-22
Payer: MEDICARE

## 2025-01-22 VITALS
TEMPERATURE: 97.1 F | DIASTOLIC BLOOD PRESSURE: 72 MMHG | BODY MASS INDEX: 27.46 KG/M2 | HEIGHT: 63 IN | OXYGEN SATURATION: 91 % | RESPIRATION RATE: 28 BRPM | HEART RATE: 79 BPM | WEIGHT: 155 LBS | SYSTOLIC BLOOD PRESSURE: 143 MMHG

## 2025-01-22 DIAGNOSIS — R09.02 HYPOXEMIA: Primary | ICD-10-CM

## 2025-01-22 LAB
ALBUMIN SERPL-MCNC: 3.8 G/DL (ref 3.5–4.6)
ALP SERPL-CCNC: 99 U/L (ref 40–130)
ALT SERPL-CCNC: 13 U/L (ref 0–33)
ANION GAP SERPL CALCULATED.3IONS-SCNC: 11 MEQ/L (ref 9–15)
AST SERPL-CCNC: 23 U/L (ref 0–35)
BASOPHILS # BLD: 0 K/UL (ref 0–0.1)
BASOPHILS NFR BLD: 0.4 % (ref 0.1–1.2)
BILIRUB SERPL-MCNC: 0.5 MG/DL (ref 0.2–0.7)
BNP BLD-MCNC: 131 PG/ML
BUN SERPL-MCNC: 8 MG/DL (ref 8–23)
CALCIUM SERPL-MCNC: 9.1 MG/DL (ref 8.5–9.9)
CHLORIDE SERPL-SCNC: 101 MEQ/L (ref 95–107)
CO2 SERPL-SCNC: 28 MEQ/L (ref 20–31)
CREAT SERPL-MCNC: 0.59 MG/DL (ref 0.5–0.9)
EKG ATRIAL RATE: 84 BPM
EKG P AXIS: 36 DEGREES
EKG P-R INTERVAL: 168 MS
EKG Q-T INTERVAL: 412 MS
EKG QRS DURATION: 100 MS
EKG QTC CALCULATION (BAZETT): 486 MS
EKG R AXIS: -16 DEGREES
EKG T AXIS: 49 DEGREES
EKG VENTRICULAR RATE: 84 BPM
EOSINOPHIL # BLD: 0.1 K/UL (ref 0–0.4)
EOSINOPHIL NFR BLD: 0.5 % (ref 0.7–5.8)
ERYTHROCYTE [DISTWIDTH] IN BLOOD BY AUTOMATED COUNT: 12.8 % (ref 11.7–14.4)
GLOBULIN SER CALC-MCNC: 2.5 G/DL (ref 2.3–3.5)
GLUCOSE SERPL-MCNC: 150 MG/DL (ref 70–99)
HCT VFR BLD AUTO: 40.8 % (ref 37–47)
HGB BLD-MCNC: 13.2 G/DL (ref 11.2–15.7)
IMM GRANULOCYTES # BLD: 0 K/UL
IMM GRANULOCYTES NFR BLD: 0.2 %
INFLUENZA A BY PCR: NEGATIVE
INFLUENZA B BY PCR: NEGATIVE
LACTATE BLDV-SCNC: 2.5 MMOL/L (ref 0.5–2.2)
LACTATE BLDV-SCNC: 3.4 MMOL/L (ref 0.5–2.2)
LYMPHOCYTES # BLD: 1.8 K/UL (ref 1.2–3.7)
LYMPHOCYTES NFR BLD: 16.4 %
MAGNESIUM SERPL-MCNC: 1.8 MG/DL (ref 1.7–2.4)
MCH RBC QN AUTO: 31.8 PG (ref 25.6–32.2)
MCHC RBC AUTO-ENTMCNC: 32.4 % (ref 32.2–35.5)
MCV RBC AUTO: 98.3 FL (ref 79.4–94.8)
MONOCYTES # BLD: 1.1 K/UL (ref 0.2–0.9)
MONOCYTES NFR BLD: 9.9 % (ref 4.7–12.5)
NEUTROPHILS # BLD: 8.2 K/UL (ref 1.6–6.1)
NEUTS SEG NFR BLD: 72.6 % (ref 34–71.1)
PLATELET # BLD AUTO: 148 K/UL (ref 182–369)
POTASSIUM SERPL-SCNC: 4.6 MEQ/L (ref 3.4–4.9)
PROT SERPL-MCNC: 6.3 G/DL (ref 6.3–8)
RBC # BLD AUTO: 4.15 M/UL (ref 3.93–5.22)
SARS-COV-2 RDRP RESP QL NAA+PROBE: NOT DETECTED
SODIUM SERPL-SCNC: 140 MEQ/L (ref 135–144)
TROPONIN, HIGH SENSITIVITY: 10 NG/L (ref 0–19)
TROPONIN, HIGH SENSITIVITY: 10 NG/L (ref 0–19)
WBC # BLD AUTO: 11.3 K/UL (ref 4–10)

## 2025-01-22 PROCEDURE — 93010 ELECTROCARDIOGRAM REPORT: CPT | Performed by: INTERNAL MEDICINE

## 2025-01-22 PROCEDURE — 94664 DEMO&/EVAL PT USE INHALER: CPT

## 2025-01-22 PROCEDURE — 84484 ASSAY OF TROPONIN QUANT: CPT

## 2025-01-22 PROCEDURE — 87502 INFLUENZA DNA AMP PROBE: CPT

## 2025-01-22 PROCEDURE — 71045 X-RAY EXAM CHEST 1 VIEW: CPT

## 2025-01-22 PROCEDURE — 96365 THER/PROPH/DIAG IV INF INIT: CPT

## 2025-01-22 PROCEDURE — 6370000000 HC RX 637 (ALT 250 FOR IP): Performed by: EMERGENCY MEDICINE

## 2025-01-22 PROCEDURE — 80053 COMPREHEN METABOLIC PANEL: CPT

## 2025-01-22 PROCEDURE — 2580000003 HC RX 258: Performed by: EMERGENCY MEDICINE

## 2025-01-22 PROCEDURE — 96361 HYDRATE IV INFUSION ADD-ON: CPT

## 2025-01-22 PROCEDURE — 83605 ASSAY OF LACTIC ACID: CPT

## 2025-01-22 PROCEDURE — 94640 AIRWAY INHALATION TREATMENT: CPT

## 2025-01-22 PROCEDURE — 87635 SARS-COV-2 COVID-19 AMP PRB: CPT

## 2025-01-22 PROCEDURE — 93005 ELECTROCARDIOGRAM TRACING: CPT

## 2025-01-22 PROCEDURE — 6360000002 HC RX W HCPCS: Performed by: EMERGENCY MEDICINE

## 2025-01-22 PROCEDURE — 80164 ASSAY DIPROPYLACETIC ACD TOT: CPT

## 2025-01-22 PROCEDURE — 96375 TX/PRO/DX INJ NEW DRUG ADDON: CPT

## 2025-01-22 PROCEDURE — 83735 ASSAY OF MAGNESIUM: CPT

## 2025-01-22 PROCEDURE — 36415 COLL VENOUS BLD VENIPUNCTURE: CPT

## 2025-01-22 PROCEDURE — 99285 EMERGENCY DEPT VISIT HI MDM: CPT

## 2025-01-22 PROCEDURE — 85025 COMPLETE CBC W/AUTO DIFF WBC: CPT

## 2025-01-22 PROCEDURE — 83880 ASSAY OF NATRIURETIC PEPTIDE: CPT

## 2025-01-22 RX ORDER — DEXAMETHASONE SODIUM PHOSPHATE 10 MG/ML
10 INJECTION, SOLUTION INTRAMUSCULAR; INTRAVENOUS ONCE
Status: COMPLETED | OUTPATIENT
Start: 2025-01-22 | End: 2025-01-22

## 2025-01-22 RX ORDER — IPRATROPIUM BROMIDE AND ALBUTEROL SULFATE 2.5; .5 MG/3ML; MG/3ML
1 SOLUTION RESPIRATORY (INHALATION) ONCE
Status: COMPLETED | OUTPATIENT
Start: 2025-01-22 | End: 2025-01-22

## 2025-01-22 RX ORDER — 0.9 % SODIUM CHLORIDE 0.9 %
1000 INTRAVENOUS SOLUTION INTRAVENOUS ONCE
Status: COMPLETED | OUTPATIENT
Start: 2025-01-22 | End: 2025-01-22

## 2025-01-22 RX ADMIN — IPRATROPIUM BROMIDE AND ALBUTEROL SULFATE 1 DOSE: 2.5; .5 SOLUTION RESPIRATORY (INHALATION) at 04:14

## 2025-01-22 RX ADMIN — DEXAMETHASONE SODIUM PHOSPHATE 10 MG: 10 INJECTION INTRAMUSCULAR; INTRAVENOUS at 04:28

## 2025-01-22 RX ADMIN — CEFTRIAXONE 1000 MG: 1 INJECTION, POWDER, FOR SOLUTION INTRAMUSCULAR; INTRAVENOUS at 03:21

## 2025-01-22 RX ADMIN — SODIUM CHLORIDE 1000 ML: 9 INJECTION, SOLUTION INTRAVENOUS at 03:22

## 2025-01-22 ASSESSMENT — ENCOUNTER SYMPTOMS
APNEA: 0
ABDOMINAL PAIN: 0
CONSTIPATION: 0
PHOTOPHOBIA: 0
NAUSEA: 0
COLOR CHANGE: 0
EYE PAIN: 0
SINUS PRESSURE: 0
WHEEZING: 0
COUGH: 0
ABDOMINAL DISTENTION: 0
BACK PAIN: 0
SORE THROAT: 0
SHORTNESS OF BREATH: 0
DIARRHEA: 0
RHINORRHEA: 0
VOMITING: 0

## 2025-01-22 ASSESSMENT — LIFESTYLE VARIABLES
HOW MANY STANDARD DRINKS CONTAINING ALCOHOL DO YOU HAVE ON A TYPICAL DAY: PATIENT DOES NOT DRINK
HOW OFTEN DO YOU HAVE A DRINK CONTAINING ALCOHOL: NEVER

## 2025-01-22 ASSESSMENT — PAIN - FUNCTIONAL ASSESSMENT: PAIN_FUNCTIONAL_ASSESSMENT: NONE - DENIES PAIN

## 2025-01-22 NOTE — ED PROVIDER NOTES
[facility] EMERGENCY DEPARTMENT  eMERGENCY dEPARTMENT eNCOUnter      Pt Name: Therese Neri  MRN: [id number]  Birthdate 1940  Date of evaluation: 1/22/2025  Provider: Dmitry Flanagan MD    CHIEF COMPLAINT       Chief Complaint   Patient presents with   • hypoxia     Hypoxia - Low SPO2 per NH         HISTORY OF PRESENT ILLNESS   (Location/Symptom, Timing/Onset,Context/Setting, Quality, Duration, Modifying Factors, Severity)  Note limiting factors.   Therese Neri is a 84 y.o. female who presents to the emergency department with complaint of low pulse ox from nursing home.  Patient presented with pulse ox of 92% on room air.  She has mental retardation and is nonverbal.  Voices no complaint.  Persistently screaming.  This is her baseline.  Other medical conditions include chronic rhinitis, dyslipidemia, small bowel obstruction, legally blind, mood disorder, periodontal disease, osteoporosis, seizure disorder, vitamin D deficiency.    HPI    Nursing Notes were reviewed.    REVIEW OF SYSTEMS    (2-9 systems for level 4, 10 or more for level 5)     Review of Systems   Constitutional: Negative.  Negative for activity change, appetite change, chills, fatigue and fever.   HENT:  Negative for congestion, ear discharge, ear pain, hearing loss, rhinorrhea, sinus pressure and sore throat.    Eyes:  Negative for photophobia, pain and visual disturbance.   Respiratory:  Negative for apnea, cough, shortness of breath and wheezing.    Cardiovascular:  Negative for chest pain, palpitations and leg swelling.   Gastrointestinal:  Negative for abdominal distention, abdominal pain, constipation, diarrhea, nausea and vomiting.   Endocrine: Negative for cold intolerance, heat intolerance and polyuria.   Genitourinary:  Negative for dysuria, flank pain, frequency and urgency.   Musculoskeletal:  Negative for arthralgias, back pain, gait problem, myalgias and neck stiffness.   Skin:  Negative for color change, pallor and rash.

## 2025-01-23 ENCOUNTER — HOSPITAL ENCOUNTER (EMERGENCY)
Age: 85
Discharge: HOME | End: 2025-01-23
Attending: EMERGENCY MEDICINE
Payer: MEDICARE

## 2025-01-23 ENCOUNTER — APPOINTMENT (OUTPATIENT)
Dept: GENERAL RADIOLOGY | Age: 85
End: 2025-01-23
Payer: MEDICARE

## 2025-01-23 VITALS
DIASTOLIC BLOOD PRESSURE: 63 MMHG | WEIGHT: 159 LBS | RESPIRATION RATE: 18 BRPM | HEIGHT: 61 IN | BODY MASS INDEX: 30.02 KG/M2 | SYSTOLIC BLOOD PRESSURE: 174 MMHG | OXYGEN SATURATION: 97 % | TEMPERATURE: 98.1 F | HEART RATE: 94 BPM

## 2025-01-23 DIAGNOSIS — J18.9 PNEUMONIA DUE TO INFECTIOUS ORGANISM, UNSPECIFIED LATERALITY, UNSPECIFIED PART OF LUNG: ICD-10-CM

## 2025-01-23 DIAGNOSIS — F54: Primary | ICD-10-CM

## 2025-01-23 DIAGNOSIS — R06.02 SHORTNESS OF BREATH: ICD-10-CM

## 2025-01-23 LAB
ANION GAP SERPL CALCULATED.3IONS-SCNC: 8 MEQ/L (ref 9–15)
BASOPHILS # BLD: 0 K/UL (ref 0–0.1)
BASOPHILS NFR BLD: 0.3 % (ref 0.1–1.2)
BUN SERPL-MCNC: 14 MG/DL (ref 8–23)
CALCIUM SERPL-MCNC: 9.5 MG/DL (ref 8.5–9.9)
CHLORIDE SERPL-SCNC: 101 MEQ/L (ref 95–107)
CO2 SERPL-SCNC: 30 MEQ/L (ref 20–31)
CREAT SERPL-MCNC: 0.49 MG/DL (ref 0.5–0.9)
EKG ATRIAL RATE: 88 BPM
EKG P-R INTERVAL: 168 MS
EKG Q-T INTERVAL: 404 MS
EKG QRS DURATION: 96 MS
EKG QTC CALCULATION (BAZETT): 488 MS
EKG R AXIS: 194 DEGREES
EKG T AXIS: 133 DEGREES
EKG VENTRICULAR RATE: 88 BPM
EOSINOPHIL # BLD: 0 K/UL (ref 0–0.4)
EOSINOPHIL NFR BLD: 0.1 % (ref 0.7–5.8)
ERYTHROCYTE [DISTWIDTH] IN BLOOD BY AUTOMATED COUNT: 12.6 % (ref 11.7–14.4)
GLUCOSE SERPL-MCNC: 88 MG/DL (ref 70–99)
HCT VFR BLD AUTO: 39 % (ref 37–47)
HGB BLD-MCNC: 12.8 G/DL (ref 11.2–15.7)
IMM GRANULOCYTES # BLD: 0 K/UL
IMM GRANULOCYTES NFR BLD: 0.3 %
LYMPHOCYTES # BLD: 2.1 K/UL (ref 1.2–3.7)
LYMPHOCYTES NFR BLD: 19.2 %
MCH RBC QN AUTO: 31.7 PG (ref 25.6–32.2)
MCHC RBC AUTO-ENTMCNC: 32.8 % (ref 32.2–35.5)
MCV RBC AUTO: 96.5 FL (ref 79.4–94.8)
MONOCYTES # BLD: 1.3 K/UL (ref 0.2–0.9)
MONOCYTES NFR BLD: 11.3 % (ref 4.7–12.5)
NEUTROPHILS # BLD: 7.7 K/UL (ref 1.6–6.1)
NEUTS SEG NFR BLD: 68.8 % (ref 34–71.1)
PLATELET # BLD AUTO: 162 K/UL (ref 182–369)
POTASSIUM SERPL-SCNC: 4.3 MEQ/L (ref 3.4–4.9)
RBC # BLD AUTO: 4.04 M/UL (ref 3.93–5.22)
SODIUM SERPL-SCNC: 139 MEQ/L (ref 135–144)
WBC # BLD AUTO: 11.1 K/UL (ref 4–10)

## 2025-01-23 PROCEDURE — 93010 ELECTROCARDIOGRAM REPORT: CPT | Performed by: INTERNAL MEDICINE

## 2025-01-23 PROCEDURE — 36415 COLL VENOUS BLD VENIPUNCTURE: CPT

## 2025-01-23 PROCEDURE — 96367 TX/PROPH/DG ADDL SEQ IV INF: CPT

## 2025-01-23 PROCEDURE — 96365 THER/PROPH/DIAG IV INF INIT: CPT

## 2025-01-23 PROCEDURE — 93005 ELECTROCARDIOGRAM TRACING: CPT

## 2025-01-23 PROCEDURE — 6360000002 HC RX W HCPCS: Performed by: EMERGENCY MEDICINE

## 2025-01-23 PROCEDURE — 80048 BASIC METABOLIC PNL TOTAL CA: CPT

## 2025-01-23 PROCEDURE — 99285 EMERGENCY DEPT VISIT HI MDM: CPT

## 2025-01-23 PROCEDURE — 2580000003 HC RX 258: Performed by: EMERGENCY MEDICINE

## 2025-01-23 PROCEDURE — 71045 X-RAY EXAM CHEST 1 VIEW: CPT

## 2025-01-23 PROCEDURE — 85025 COMPLETE CBC W/AUTO DIFF WBC: CPT

## 2025-01-23 RX ORDER — LORAZEPAM 2 MG/ML
1 INJECTION INTRAMUSCULAR ONCE
Status: DISCONTINUED | OUTPATIENT
Start: 2025-01-23 | End: 2025-01-23 | Stop reason: HOSPADM

## 2025-01-23 RX ORDER — AZITHROMYCIN 250 MG/1
TABLET, FILM COATED ORAL
Qty: 1 PACKET | Refills: 0 | Status: SHIPPED | OUTPATIENT
Start: 2025-01-23 | End: 2025-01-27

## 2025-01-23 RX ADMIN — AZITHROMYCIN MONOHYDRATE 500 MG: 500 INJECTION, POWDER, LYOPHILIZED, FOR SOLUTION INTRAVENOUS at 08:05

## 2025-01-23 RX ADMIN — CEFTRIAXONE 1000 MG: 1 INJECTION, POWDER, FOR SOLUTION INTRAMUSCULAR; INTRAVENOUS at 07:34

## 2025-01-23 ASSESSMENT — ENCOUNTER SYMPTOMS
COUGH: 0
SORE THROAT: 0
NAUSEA: 0
BACK PAIN: 0
ABDOMINAL PAIN: 0
EYE DISCHARGE: 0
SHORTNESS OF BREATH: 1
VOMITING: 0
EYE REDNESS: 0

## 2025-01-23 ASSESSMENT — PAIN - FUNCTIONAL ASSESSMENT: PAIN_FUNCTIONAL_ASSESSMENT: ADULT NONVERBAL PAIN SCALE (NPVS)

## 2025-01-23 ASSESSMENT — LIFESTYLE VARIABLES
HOW OFTEN DO YOU HAVE A DRINK CONTAINING ALCOHOL: PATIENT UNABLE TO ANSWER
HOW MANY STANDARD DRINKS CONTAINING ALCOHOL DO YOU HAVE ON A TYPICAL DAY: PATIENT UNABLE TO ANSWER
HOW OFTEN DO YOU HAVE A DRINK CONTAINING ALCOHOL: PATIENT UNABLE TO ANSWER

## 2025-01-23 NOTE — ED PROVIDER NOTES
[facility] EMERGENCY DEPARTMENT  EMERGENCY DEPARTMENT ENCOUNTER      Pt Name: Therese Neri  MRN: [id number]  Birthdate 1940  Date of evaluation: 1/23/2025  Provider: ANGELITA HOLT DO    CHIEF COMPLAINT       Chief Complaint   Patient presents with    Low pulse ox per [facility] staff     Was seen here yesterday         HISTORY OF PRESENT ILLNESS   (Location/Symptom, Timing/Onset, Context/Setting, Quality, Duration, Modifying Factors, Severity)  Note limiting factors.   Therese Neri is a 85 y.o. female who presents to the emergency department with low pulse ox at [facility].  The EMS states she was normal color, no struggle with respiration and 98% on their pulse ox.  The patient was seen yesterday.     The history is provided by the EMS personnel, medical records and a caregiver.   Shortness of Breath  Severity:  Unable to specify  Onset quality:  Sudden  Timing:  Constant  Progression:  Unchanged  Chronicity:  New  Relieved by:  Nothing  Worsened by:  Nothing  Ineffective treatments:  None tried  Associated symptoms: no abdominal pain, no chest pain, no cough, no ear pain, no fever, no neck pain, no rash, no sore throat and no vomiting    Risk factors: prolonged immobilization        Nursing Notes were reviewed.    REVIEW OF SYSTEMS    (2-9 systems for level 4, 10 or more for level 5)     Review of Systems   Unable to perform ROS: Psychiatric disorder   Constitutional:  Negative for chills and fever.   HENT:  Negative for ear pain and sore throat.    Eyes:  Negative for discharge and redness.   Respiratory:  Positive for shortness of breath. Negative for cough.    Cardiovascular:  Negative for chest pain and palpitations.   Gastrointestinal:  Negative for abdominal pain, nausea and vomiting.   Genitourinary:  Negative for difficulty urinating and dysuria.   Musculoskeletal:  Negative for back pain and neck pain.   Skin:  Negative for rash and wound.   Neurological:  Negative for dizziness  Tympanic membrane normal.      Left Ear: Tympanic membrane normal.      Nose: Nose normal.      Mouth/Throat:      Mouth: Mucous membranes are moist.      Pharynx: Oropharynx is clear.   Eyes:      General: Lids are normal.      Extraocular Movements: Extraocular movements intact.      Conjunctiva/sclera: Conjunctivae normal.      Pupils: Pupils are equal, round, and reactive to light.   Cardiovascular:      Rate and Rhythm: Normal rate and regular rhythm.      Pulses: Normal pulses.      Heart sounds: Normal heart sounds.   Pulmonary:      Effort: Pulmonary effort is normal.      Breath sounds: Normal breath sounds.   Abdominal:      General: Abdomen is flat. Bowel sounds are normal.      Palpations: Abdomen is soft.   Musculoskeletal:         General: Normal range of motion.      Cervical back: Full passive range of motion without pain, normal range of motion and neck supple.   Skin:     General: Skin is warm.      Capillary Refill: Capillary refill takes less than 2 seconds.   Neurological:      General: No focal deficit present.      Mental Status: She is alert. Mental status is at baseline.      Deep Tendon Reflexes: Reflexes are normal and symmetric.      Comments: Patient is screaming constantly   Psychiatric:         Attention and Perception: She is inattentive.         Mood and Affect: Affect is inappropriate.         Speech: She is noncommunicative.         Behavior: Behavior is combative.         Judgment: Judgment is inappropriate.      Comments: Patient is MRD         DIAGNOSTIC RESULTS     EKG: All EKG's are interpreted by the Emergency Department Physician who either signs or Co-signs this chart in the absence of a cardiologist.        RADIOLOGY:   Non-plain film images such as CT, Ultrasound and MRI are read by the radiologist. Plain radiographic images are visualized and preliminarily interpreted by the emergency physician with the below findings:        Interpretation per the Radiologist below, if

## 2025-01-23 NOTE — ED TRIAGE NOTES
Pt was sent in from [facility] for low pulse ox. Upon arrival pt pulse ox 97% on room air. No respiratory distress noted. Pt is MRDD and nonverbal. Pt yells incomprehensible sounds. Unable to obtain blood pressure at this time due to pt flailing arms, pulling away, and not holding still. Pt unable to follow directions to hold still. Dr. Isaías rivera.

## 2025-01-25 LAB
VALPROATE FREE MFR SERPL: 18 % (ref 5–18)
VALPROATE FREE SERPL-MCNC: 13 UG/ML (ref 7–23)
VALPROATE SERPL-MCNC: 71 UG/ML (ref 50–125)

## 2025-05-11 ENCOUNTER — APPOINTMENT (OUTPATIENT)
Dept: GENERAL RADIOLOGY | Age: 85
End: 2025-05-11
Payer: MEDICARE

## 2025-05-11 ENCOUNTER — HOSPITAL ENCOUNTER (EMERGENCY)
Age: 85
Discharge: HOME | End: 2025-05-11
Attending: EMERGENCY MEDICINE
Payer: MEDICARE

## 2025-05-11 VITALS
OXYGEN SATURATION: 93 % | SYSTOLIC BLOOD PRESSURE: 105 MMHG | WEIGHT: 159 LBS | RESPIRATION RATE: 20 BRPM | DIASTOLIC BLOOD PRESSURE: 93 MMHG | BODY MASS INDEX: 30.02 KG/M2 | TEMPERATURE: 97.3 F | HEART RATE: 73 BPM

## 2025-05-11 DIAGNOSIS — R09.89 CHOKING EPISODE: ICD-10-CM

## 2025-05-11 DIAGNOSIS — Z87.09 H/O ASPIRATION PNEUMONITIS: Primary | ICD-10-CM

## 2025-05-11 PROCEDURE — 99284 EMERGENCY DEPT VISIT MOD MDM: CPT

## 2025-05-11 PROCEDURE — 71045 X-RAY EXAM CHEST 1 VIEW: CPT

## 2025-05-11 PROCEDURE — 96372 THER/PROPH/DIAG INJ SC/IM: CPT

## 2025-05-11 PROCEDURE — 6360000002 HC RX W HCPCS: Performed by: EMERGENCY MEDICINE

## 2025-05-11 RX ORDER — AMOXICILLIN AND CLAVULANATE POTASSIUM 600; 42.9 MG/5ML; MG/5ML
600 POWDER, FOR SUSPENSION ORAL 2 TIMES DAILY
Qty: 100 ML | Refills: 0 | Status: SHIPPED | OUTPATIENT
Start: 2025-05-11 | End: 2025-05-21

## 2025-05-11 RX ORDER — LIDOCAINE HYDROCHLORIDE 10 MG/ML
5 INJECTION, SOLUTION INFILTRATION; PERINEURAL ONCE
Status: COMPLETED | OUTPATIENT
Start: 2025-05-11 | End: 2025-05-11

## 2025-05-11 RX ORDER — CEFTRIAXONE 1 G/1
1000 INJECTION, POWDER, FOR SOLUTION INTRAMUSCULAR; INTRAVENOUS ONCE
Status: COMPLETED | OUTPATIENT
Start: 2025-05-11 | End: 2025-05-11

## 2025-05-11 RX ADMIN — CEFTRIAXONE 1000 MG: 1 INJECTION, POWDER, FOR SOLUTION INTRAMUSCULAR; INTRAVENOUS at 07:50

## 2025-05-11 RX ADMIN — LIDOCAINE HYDROCHLORIDE 2.1 ML: 10 INJECTION, SOLUTION INFILTRATION; PERINEURAL at 08:10

## 2025-05-11 NOTE — ED PROVIDER NOTES
[facility] EMERGENCY DEPARTMENT  eMERGENCY dEPARTMENT eNCOUnter      Pt Name: Therese Neri  MRN: [id number]  Birthdate 1940  Date of evaluation: 5/11/2025  Provider: Cathleen Mohan MD    CHIEF COMPLAINT       Chief Complaint   Patient presents with    Choking     Pt arrives via [facility] from [facility].  Per EMS, group home staff was giving pt her morning meds (in applesauce) and pt began to cough and turn blue.  By the time EMS arrived, they stated pt was still coughing but not blue. POX was 95 percent on RA per EMS. Pt arrives pink, warm and dry.  Respirations even/unlabored-periodic coughing heard.           HISTORY OF PRESENT ILLNESS   (Location/Symptom, Timing/Onset,Context/Setting, Quality, Duration, Modifying Factors, Severity)  Note limiting factors.   Therese Neri is a 85 y.o. female who presents to the emergency department patient is a send here from [facility] for concern about aspiration pneumonia as patient was getting her medication with applesauce as an episode choking episode frequent bouts of coughing patient turning blue nursing home staff called the paramedics by time paramedics arrived she was not blue work of breathing unlabored not labored pulse ox was 96% transferred here for further evaluation to make sure there is no aspiration pneumonia bouts of frequent coughing subsided by time patient came here does not know history of chronic rhinitis mood disorder agitation screaming off-and-on recurrent aspiration pneumonia in the past seizure disorder legally blind nonverbal    HPI    NursingNotes were reviewed.    REVIEW OF SYSTEMS    (2-9 systems for level 4, 10 or more for level 5)     Review of Systems   Unable to perform ROS: Patient nonverbal       Except as noted above the remainder of the review of systems was reviewed and negative.       PAST MEDICAL HISTORY     Past Medical History:   Diagnosis Date    Ceruminosis     Chronic rhinitis     Dyslipidemia     History of

## 2025-05-11 NOTE — ED NOTES
Transport back to [facility] is established through [facility] with ETA of 1030.

## 2025-06-06 ENCOUNTER — HOSPITAL ENCOUNTER (EMERGENCY)
Age: 85
Discharge: HOME | End: 2025-06-06
Payer: MEDICARE

## 2025-06-06 ENCOUNTER — APPOINTMENT (OUTPATIENT)
Dept: CT IMAGING | Age: 85
End: 2025-06-06
Payer: MEDICARE

## 2025-06-06 VITALS
SYSTOLIC BLOOD PRESSURE: 172 MMHG | RESPIRATION RATE: 18 BRPM | HEART RATE: 94 BPM | OXYGEN SATURATION: 92 % | DIASTOLIC BLOOD PRESSURE: 73 MMHG | BODY MASS INDEX: 32.28 KG/M2 | WEIGHT: 171 LBS | TEMPERATURE: 98.8 F

## 2025-06-06 DIAGNOSIS — S09.90XA CLOSED HEAD INJURY, INITIAL ENCOUNTER: Primary | ICD-10-CM

## 2025-06-06 PROCEDURE — 70450 CT HEAD/BRAIN W/O DYE: CPT

## 2025-06-06 PROCEDURE — 72125 CT NECK SPINE W/O DYE: CPT

## 2025-06-06 PROCEDURE — 99284 EMERGENCY DEPT VISIT MOD MDM: CPT

## 2025-06-06 ASSESSMENT — ENCOUNTER SYMPTOMS
RESPIRATORY NEGATIVE: 1
COLOR CHANGE: 0
VOMITING: 0
EYES NEGATIVE: 1
NAUSEA: 0
ALLERGIC/IMMUNOLOGIC NEGATIVE: 1

## 2025-06-06 ASSESSMENT — PAIN - FUNCTIONAL ASSESSMENT: PAIN_FUNCTIONAL_ASSESSMENT: ADULT NONVERBAL PAIN SCALE (NPVS)

## 2025-06-06 NOTE — ED NOTES
Report given to Onelia Bullock at [facility]. Patient transported with [facility] to [facility] in stable condition.

## 2025-06-06 NOTE — ED PROVIDER NOTES
[facility] EMERGENCY DEPARTMENT  EMERGENCY DEPARTMENT ENCOUNTER      Pt Name: Therese Neri  MRN: [id number]  Birthdate 1940  Date of evaluation: 6/6/2025  Provider: ONELIA Park CNP      HISTORY OF PRESENT ILLNESS    Therese Neri is a 85 y.o. female who presents to the Emergency Department with report of head injury from caregivers at skilled nursing facility.  They were transferring patient holding her while she stood up and she fell backwards hitting her head on the wall.  They deny any loss of consciousness, vomiting or seizure.  Patient is nonverbal, deaf, blind.  Report baseline behavior since the incident.        REVIEW OF SYSTEMS       Review of Systems   Constitutional: Negative.  Negative for activity change and appetite change.   HENT: Negative.     Eyes: Negative.    Respiratory: Negative.     Cardiovascular: Negative.    Gastrointestinal:  Negative for nausea and vomiting.   Endocrine: Negative.  Negative for polyphagia and polyuria.   Musculoskeletal: Negative.  Negative for neck pain and neck stiffness.   Skin: Negative.  Negative for color change and wound.   Allergic/Immunologic: Negative.    Neurological: Negative.  Negative for seizures, facial asymmetry and weakness.   Hematological:  Does not bruise/bleed easily.   Psychiatric/Behavioral: Negative.  Negative for agitation, confusion, decreased concentration and dysphoric mood.          PAST MEDICAL HISTORY     Past Medical History:   Diagnosis Date    Ceruminosis     Chronic rhinitis     Dyslipidemia     History of encephalitis     Large bowel obstruction (HCC)     Legally blind     Mood disorder     MR (mental retardation)     Osteoporosis     Periodontal disease     Pneumonia 12/9/2018    SBO (small bowel obstruction) (HCC) 3/22/2019    Seizure disorder (HCC)     Vitamin D deficiency          SURGICAL HISTORY       Past Surgical History:   Procedure Laterality Date    COLONOSCOPY N/A 3/22/2019    COLONOSCOPY performed  Jeb CONNELL, ONELIA - CNP  06/06/25 [id number]

## 2025-06-06 NOTE — ED TRIAGE NOTES
Patient to ED due to a fall. Patient had a witness fall where she fell and hit the back of her head. Per EMS it was a witnessed fall. Patient is non-verbal and blind at baseline. No outward signs of acute distress noted during triage.

## (undated) DEVICE — ADAPTER FLSH PMP FLD MGMT GI IRRIG OFP 2 DISPOSABLE

## (undated) DEVICE — Z DISCONTINUED NO SUB IDED GLOVE SURG BEAD CUF 8 STD PF WHT STRL TRIUMPH LT LTX

## (undated) DEVICE — TUBE SET 96 MM 64 MM H2O PERISTALTIC STD AUX CHANNEL

## (undated) DEVICE — SPONGE GZ W4XL4IN COT 12 PLY TYP VII WVN C FLD DSGN

## (undated) DEVICE — BRUSH ENDO CLN L90.5IN SHTH DIA1.7MM BRIST DIA5-7MM 2-6MM

## (undated) DEVICE — JELLY,LUBE,STERILE,FLIP TOP,TUBE,2-OZ: Brand: MEDLINE

## (undated) DEVICE — CANNULA CAPNOGRAPHY AD O2 TBNG L7FT FEM LUER STYL 4707F7725] SALTER LABS INC]

## (undated) DEVICE — Device: Brand: ENDO SMARTCAP

## (undated) DEVICE — 1200CC COLLECTION UNIT 6MM X 6' PATIENT: Brand: MEDI-VAC